# Patient Record
Sex: FEMALE | Race: WHITE | NOT HISPANIC OR LATINO
[De-identification: names, ages, dates, MRNs, and addresses within clinical notes are randomized per-mention and may not be internally consistent; named-entity substitution may affect disease eponyms.]

---
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
## 2024-01-03 ENCOUNTER — OFFICE VISIT (OUTPATIENT)
Dept: PHYSICAL THERAPY | Age: 19
End: 2024-01-03
Attending: ORTHOPAEDIC SURGERY
Payer: COMMERCIAL

## 2024-01-03 PROCEDURE — 97110 THERAPEUTIC EXERCISES: CPT | Performed by: PHYSICAL THERAPIST

## 2024-01-03 NOTE — PROGRESS NOTES
Dx: Rupture of posterior cruciate ligament of right knee, R knee pain           Authorized # of Visits:  12         Next MD visit: none scheduled  Fall Risk: low        Precautions: instability in knee with tear in PCL       Referring MD: Ben     Start of Service: 11/22/23     Subjective:   Pt reports that she was sick for a while and could not do her HEP. Pt reports that she is no longer having pain but still has pain with some issues with PF of ankle and still has instability or feels loose with performing standing to sitting.   Pain 0-6/10 worst occurs during sharp pain with quick movements.  Objective:   Fibular mob: posterior mod resistance, no pain  Tibial mob: posterior normal resistance, no pain  Patellar mob: normal resistance all directions, no pain    Assessment:   PT and pt discussing contacting MD to make a follow up apt and discuss options if instability does not improve over the next two to 3 weeks. Pt able to use bike with no issues but is still having pain with performing knee flexion with ankle PF, with performing standing to sitting while in the shower, with posterior pressure to tibia. Pt able to complete lateral step up and down on R LE with no issues as well as front step up and down and squats with no instability. Pt able to complete SL bridges with no pain as well as side lying hip abd with no pain and good control and tech. Pt is making steady progress with strength and stabilization but cont with instability and positive test with PCL tear with posterior drawer test.        Goals: (To be met in 12 visits)   Pt will increase hip and knee strength to grossly 5/5 to be able to ambulate ambulate around school campus without pain.  Pt will demonstrate increased hip ER/ABD strength to 5/5 to perform stepping and squatting activities without excessive femoral IR/ADD.  Pt will be independent and compliant with comprehensive HEP to maintain progress achieved in PT.  Pt able to increase knee  flexion strength to 5/5 on R side in order to improve gait mechanics during community ambulation.    Plan:   Pt to be able to self recognize knee valgus and progress strengthening as pt tolerates  Date: 11/24/2023   TX#: 2/12 Date: 11/30/23              TX#: 3/12   Date:       12/6/23        TX#: 4/12 Date:          12/12/23     TX#: 5/12 Date: 12/19/23              TX#: 6/12 Date: 1/3/24              TX#: 1/5 Date:               TX#: 2/5   TE x 40  Bike x 5 mins  Standing TKE in mirror x 5, cues to equalize weight bearing and ext in B LE  Sit to stand x 10 no UE for support  Lat walking x 30' ea yellow TB ankles  Step down x 10 2'' , x 10 4''  LAQ x 10 5 sec hold 2#  SLR x 10 5 sec hold 2#  Side lying hip abd x 10 5 sec hold 2#  Hip abd with bridges x 10 5 sec hold Red TB  Quad lifts in long sit x 10 ea 5 sec hold   TE x 30 min  Bike x 5 min  Standing TKE with ball x10 5 sec hold   DL press x 10 5 cords  SL press x 10 4 cords   LAQ x 10 5 sec hold 3#  Seated HS curl x 10 5 sec hold red TB  NMR x 15 min  Standing alignment education focused on neutral knees in frontal and sagittal planes x 3 min  Step down x 10 ea  SLS on ground 2 x 30 sec  Sit to stand with band for abd x 10 with ecc focus   TE x 45 min  Bike x 10 min  LAQ x 10 5 sec holds 5#  Seated HS curl x 10 5 sec hold green TB  Sidesteps x 40' green TB  Monster walks x 40' green TB  DL press x 10 5 cords with green TB around knees for abd  SL press x 10 5 cords  SLB 2 x 30\" ea  Step down x 10 hallway stairs   SL bridge 2 x 10 ea         TE x 10 min  Bike x 10 min  NMR x 10 min  SLB with knee flexion 2 x 30\" ea  Lat step down 2 x 10 4\" step     TE x 37  Bike x 10 mins L 2  Leg press x 6 cords 2 x 15   SL press 2 x 15 5 cords  HS curls x 15 ea green   Hip IR/ER x 12 ea bilaterally green   SLR x 12 5 sec hold 2#  SL hip abd x 12 5 sec hold 2#  SL hip flex to ext x 10 2#  Prone hip ext with knee flexion x 10 5 sec hold 2#   TE x 45 mins  Bike x 10 mins L 2  Lat  step up and down x 10 6'' step  Front step up and down x 10 6'' step  SL bridges x 10 5 sec hold R  SL hip abd x 10 5 sec hold  Discussion on next steps with therapy and calling ortho for follow up, anatomy of the knee and kinesiology with no PCL and mechanics and pressure on meniscus                    MT x 20 min  Fibular mob posterior grade 2-4, patellar mob grade 4, tibial posterior mob grade 4, myofascial release to lateral hamstring and ITB MT x 8 mins  STM to ITB and quad as well as HS        Charges: TE:3      Total Timed Treatment: 45 min  Total Treatment Time: 45 min

## 2024-01-04 ENCOUNTER — OFFICE VISIT (OUTPATIENT)
Dept: PHYSICAL THERAPY | Age: 19
End: 2024-01-04
Attending: ORTHOPAEDIC SURGERY
Payer: COMMERCIAL

## 2024-01-04 PROCEDURE — 97110 THERAPEUTIC EXERCISES: CPT | Performed by: PHYSICAL THERAPIST

## 2024-01-04 NOTE — PROGRESS NOTES
Dx: Rupture of posterior cruciate ligament of right knee, R knee pain           Authorized # of Visits:  12         Next MD visit:1/17/24  Fall Risk: low        Precautions: instability in knee with tear in PCL       Referring MD: Ben     Start of Service: 11/22/23     Subjective:   Pt reports that she did make a follow up ortho apt. Pt reports no residual soreness following last tx session. Pt reports that   Pain 0-6/10 worst occurs during sharp pain with quick movements.  Objective:   Fibular mob: posterior mod resistance, no pain  Tibial mob: posterior normal resistance, no pain  Patellar mob: normal resistance all directions, no pain    Assessment:   Pt with no issues on the bike. Pt able to complete forward and backward walking lunges with no pain and good control with no valgus. Pt able to perform lat walking with band at knees with no pain and good tech. Pt with slight increase in lateral trunk lean with moving band to ankles but cont with no valgus in knee.  Pt able to complete standing hip flexion with resistance and single leg sit to stand with good control and no pain. Pt with good control and no pain with SLR. Pt to cont with therapy with focus on strengthening until follow up with ortho on the 17th.      Goals: (To be met in 12 visits)   Pt will increase hip and knee strength to grossly 5/5 to be able to ambulate ambulate around school campus without pain.  Pt will demonstrate increased hip ER/ABD strength to 5/5 to perform stepping and squatting activities without excessive femoral IR/ADD.  Pt will be independent and compliant with comprehensive HEP to maintain progress achieved in PT.  Pt able to increase knee flexion strength to 5/5 on R side in order to improve gait mechanics during community ambulation.    Plan:   Pt to be able to self recognize knee valgus and progress strengthening as pt tolerates  Date: 11/24/2023   TX#: 2/12 Date: 11/30/23              TX#: 3/12   Date:       12/6/23         TX#: 4/12 Date:          12/12/23     TX#: 5/12 Date: 12/19/23              TX#: 6/12 Date: 1/3/24              TX#: 1/5 Date: 1/4/24              TX#: 2/5   TE x 40  Bike x 5 mins  Standing TKE in mirror x 5, cues to equalize weight bearing and ext in B LE  Sit to stand x 10 no UE for support  Lat walking x 30' ea yellow TB ankles  Step down x 10 2'' , x 10 4''  LAQ x 10 5 sec hold 2#  SLR x 10 5 sec hold 2#  Side lying hip abd x 10 5 sec hold 2#  Hip abd with bridges x 10 5 sec hold Red TB  Quad lifts in long sit x 10 ea 5 sec hold   TE x 30 min  Bike x 5 min  Standing TKE with ball x10 5 sec hold   DL press x 10 5 cords  SL press x 10 4 cords   LAQ x 10 5 sec hold 3#  Seated HS curl x 10 5 sec hold red TB  NMR x 15 min  Standing alignment education focused on neutral knees in frontal and sagittal planes x 3 min  Step down x 10 ea  SLS on ground 2 x 30 sec  Sit to stand with band for abd x 10 with ecc focus   TE x 45 min  Bike x 10 min  LAQ x 10 5 sec holds 5#  Seated HS curl x 10 5 sec hold green TB  Sidesteps x 40' green TB  Monster walks x 40' green TB  DL press x 10 5 cords with green TB around knees for abd  SL press x 10 5 cords  SLB 2 x 30\" ea  Step down x 10 hallway stairs   SL bridge 2 x 10 ea         TE x 10 min  Bike x 10 min  NMR x 10 min  SLB with knee flexion 2 x 30\" ea  Lat step down 2 x 10 4\" step     TE x 37  Bike x 10 mins L 2  Leg press x 6 cords 2 x 15   SL press 2 x 15 5 cords  HS curls x 15 ea green   Hip IR/ER x 12 ea bilaterally green   SLR x 12 5 sec hold 2#  SL hip abd x 12 5 sec hold 2#  SL hip flex to ext x 10 2#  Prone hip ext with knee flexion x 10 5 sec hold 2#   TE x 45 mins  Bike x 10 mins L 2  Lat step up and down x 10 6'' step  Front step up and down x 10 6'' step  SL bridges x 10 5 sec hold R  SL hip abd x 10 5 sec hold  Discussion on next steps with therapy and calling ortho for follow up, anatomy of the knee and kinesiology with no PCL and mechanics and pressure on  meniscus     TE x 40 mins  Bike x 12 mins L 2  Walking lunges x 30' forward and backward  Lat walking x 30' band at thighs green, x 30' band at ankles green  Standing hip flexion x 15 ea green TB  Single leg sit to stand x 10 R   SLR x 15 5#  SL hip abd x 15 5#  Prone hip ext x 15 5#  Supine hip flex to abd x 10 ea                         MT x 20 min  Fibular mob posterior grade 2-4, patellar mob grade 4, tibial posterior mob grade 4, myofascial release to lateral hamstring and ITB MT x 8 mins  STM to ITB and quad as well as HS        Charges: TE:3      Total Timed Treatment: 40 min  Total Treatment Time: 40 min

## 2024-01-08 ENCOUNTER — HOSPITAL ENCOUNTER (OUTPATIENT)
Age: 19
Discharge: HOME | End: 2024-01-08
Payer: COMMERCIAL

## 2024-01-08 VITALS
HEART RATE: 96 BPM | RESPIRATION RATE: 16 BRPM | TEMPERATURE: 99 F | OXYGEN SATURATION: 99 % | DIASTOLIC BLOOD PRESSURE: 90 MMHG | SYSTOLIC BLOOD PRESSURE: 130 MMHG

## 2024-01-08 DIAGNOSIS — R10.32 LEFT LOWER QUADRANT ABDOMINAL PAIN: ICD-10-CM

## 2024-01-08 DIAGNOSIS — R30.0 DYSURIA: Primary | ICD-10-CM

## 2024-01-08 LAB
B-HCG UR QL: NEGATIVE
BILIRUB UR QL STRIP: NEGATIVE
CLARITY UR: CLEAR
COLOR UR: YELLOW
GLUCOSE UR STRIP-MCNC: NEGATIVE MG/DL
KETONES UR STRIP-MCNC: NEGATIVE MG/DL
LEUKOCYTE ESTERASE UR QL STRIP: NEGATIVE
NITRITE UR QL STRIP: NEGATIVE
PH UR STRIP: 6 [PH]
PROT UR STRIP-MCNC: NEGATIVE MG/DL
SP GR UR STRIP: 1.01
UROBILINOGEN UR STRIP-ACNC: <2 MG/DL

## 2024-01-08 PROCEDURE — 99213 OFFICE O/P EST LOW 20 MIN: CPT | Performed by: PHYSICIAN ASSISTANT

## 2024-01-08 PROCEDURE — 81002 URINALYSIS NONAUTO W/O SCOPE: CPT | Performed by: PHYSICIAN ASSISTANT

## 2024-01-08 PROCEDURE — 81025 URINE PREGNANCY TEST: CPT | Performed by: PHYSICIAN ASSISTANT

## 2024-01-09 ENCOUNTER — TELEPHONE (OUTPATIENT)
Dept: FAMILY MEDICINE CLINIC | Facility: CLINIC | Age: 19
End: 2024-01-09

## 2024-01-09 RX ORDER — SULFAMETHOXAZOLE AND TRIMETHOPRIM 800; 160 MG/1; MG/1
1 TABLET ORAL 2 TIMES DAILY
Qty: 6 TABLET | Refills: 0 | Status: SHIPPED | OUTPATIENT
Start: 2024-01-09 | End: 2024-01-12

## 2024-01-09 NOTE — TELEPHONE ENCOUNTER
Patient advised. Verbalized understanding.   Also advised to push fluids and if feeling worse to call us. Verbalized understanding.

## 2024-01-09 NOTE — DISCHARGE INSTRUCTIONS
Continue to monitor your symptoms we will contact you with your urine culture but if symptoms worsen report to the urgent care or emergency room  Follow-up with primary care doctor.

## 2024-01-09 NOTE — ED PROVIDER NOTES
Patient Seen in: [facility]      History     Chief Complaint   Patient presents with    Urinary Symptoms    Abdomen/Flank Pain     Stated Complaint: possible UTI    Subjective:   The history is provided by the patient.       18-year-old female presents to the urgent care due to intermittent dysuria for the past 24 hours.  Associated urinary urgency.  At times will have left lower abdominal pain only with urination.  No fevers, nausea or vomiting.  Normal bowel movements today no diarrhea.  No abnormal vaginal discharge or no concern for STIs.  No history of urinary tract infections or kidney stones in the past.  Last menstrual cycle was 2 weeks ago and normal.  No history of ovarian cyst.  No Previous abdominal surgeries    Objective:   Past Medical History:   Diagnosis Date    Anxiety     Depression               History reviewed. No pertinent surgical history.             No pertinent social history.            Review of Systems   Constitutional: Negative.    Respiratory: Negative.     Cardiovascular: Negative.    Gastrointestinal:  Positive for abdominal pain. Negative for diarrhea, nausea and vomiting.   Genitourinary:  Positive for dysuria and urgency. Negative for flank pain, vaginal bleeding, vaginal discharge and vaginal pain.   Musculoskeletal: Negative.    Skin: Negative.        Positive for stated complaint: possible UTI  Other systems are as noted in HPI.  Constitutional and vital signs reviewed.      All other systems reviewed and negative except as noted above.    Physical Exam     ED Triage Vitals   BP 01/08/24 1916 130/90   Pulse 01/08/24 1916 96   Resp 01/08/24 1916 16   Temp 01/08/24 1943 98.7 °F (37.1 °C)   Temp src 01/08/24 1943 Oral   SpO2 01/08/24 1916 99 %   O2 Device 01/08/24 1916 None (Room air)       Current:/90   Pulse 96   Temp 98.7 °F (37.1 °C) (Oral)   Resp 16   LMP 12/24/2023 (Exact Date)   SpO2 99%         Physical Exam  Vitals and nursing note reviewed.    Constitutional:       General: She is not in acute distress.     Appearance: She is well-developed. She is not toxic-appearing.   HENT:      Head: Normocephalic.   Cardiovascular:      Rate and Rhythm: Normal rate and regular rhythm.   Pulmonary:      Effort: Pulmonary effort is normal.      Breath sounds: Normal breath sounds.   Abdominal:      General: Abdomen is flat. Bowel sounds are normal.      Palpations: Abdomen is soft.      Tenderness: There is no abdominal tenderness. There is no right CVA tenderness, left CVA tenderness or guarding.   Skin:     General: Skin is warm.   Neurological:      General: No focal deficit present.      Mental Status: She is alert and oriented to person, place, and time.   Psychiatric:         Mood and Affect: Mood normal.         Behavior: Behavior normal.               ED Course     Labs Reviewed   [facility] POCT URINALYSIS DIPSTICK - Abnormal; Notable for the following components:       Result Value    Blood, Urine Trace-Intact (*)     All other components within normal limits   POCT PREGNANCY URINE - Normal   URINE CULTURE, ROUTINE                      MDM     18-year-old female presents to the urgent care due to dysuria for the past 24 hours.  Associated urinary frequency.  Denies any fevers, flank pain.  At times with urination will complain of left lower quadrant pain but only with urination.  Otherwise no abnormal vaginal discharge vaginal bleeding.  No history of UTIs in the past.          Exam the patient is afebrile nontoxic.  Vital signs are stable.  Abdomen is completely soft and nontender.  No pelvic complaints.  UA shows trace blood no other signs of infection.  Pregnancy is negative.  I discussed the broad differential of diagnoses about this abdominal pain.  Including ovarian cyst, UTIs, nephrolithiasis.  Patient is currently resting comfortably without any pain and has taken no pain medications.  Will send her urine for culture.  Patient would like to just continue  to monitor her symptoms and will return return to the urgent care/emergency room if symptoms worsen.  Strict return precautions were discussed at length and all questions were answered.                     Medical Decision Making  Problems Addressed:  Dysuria: acute illness or injury        Disposition and Plan     Clinical Impression:  1. Dysuria    2. Left lower quadrant abdominal pain         Disposition:  Discharge  1/8/2024  7:44 pm    Follow-up:  Kelley Dela Cruz MD  [id number] [id number] [phone]                Medications Prescribed:  Current Discharge Medication List

## 2024-01-09 NOTE — TELEPHONE ENCOUNTER
Call from patient  Went to IC yesterday for painful urination  UA showed blood  Culture pending  Nothing prescribed  Patient states still having painful urination  Please advise

## 2024-01-09 NOTE — TELEPHONE ENCOUNTER
Pt went to the IC yesterday for UTI sx,  the rapid came back negative,  but pt is still having painful urination and blood in her urine.  She would like to talk to a nurse

## 2024-01-10 NOTE — TELEPHONE ENCOUNTER
Pt's mom called and states pt's still having bad pain and has now started having pain in her back. Per pt's mom, she is negative for a bladder infection and would like to know how to proceed. Please advise

## 2024-01-10 NOTE — TELEPHONE ENCOUNTER
Called mom to discuss - LMTCB.  Discussed with [place].  Pt should go to the ER for possible kidney stone.  Urine culture is negative.

## 2024-01-11 ENCOUNTER — OFFICE VISIT (OUTPATIENT)
Dept: PHYSICAL THERAPY | Age: 19
End: 2024-01-11
Attending: ORTHOPAEDIC SURGERY
Payer: COMMERCIAL

## 2024-01-11 PROCEDURE — 97110 THERAPEUTIC EXERCISES: CPT | Performed by: PHYSICAL THERAPIST

## 2024-01-12 ENCOUNTER — APPOINTMENT (OUTPATIENT)
Dept: PHYSICAL THERAPY | Age: 19
End: 2024-01-12
Attending: ORTHOPAEDIC SURGERY
Payer: COMMERCIAL

## 2024-01-12 NOTE — PROGRESS NOTES
Dx: Rupture of posterior cruciate ligament of right knee, R knee pain           Authorized # of Visits:  12         Next MD visit:1/17/24  Fall Risk: low        Precautions: instability in knee with tear in PCL       Referring MD: Ben     Start of Service: 11/22/23     Subjective:   Pt reports that she is feeling better with less pain overall. Pt reports that at times she can flex her knee with no pain and other times it is still sore and stuck.  Pain 0-6/10 worst occurs during sharp pain with quick movements.  Objective:   Fibular mob: posterior mod resistance, no pain  Tibial mob: posterior normal resistance, no pain  Patellar mob: normal resistance all directions, no pain    Assessment:   Pt able to complete lat walking on TM with resistance with no issues. Pt able to complete sumo squats with good tech and no pain. Pt able to complete single leg step ups on big black box with mod push off and fair eccentric control. Pt able to complete HS isometric holds with no issues on L and mild posterior tightness and discomfort on R. Pt able to complete sit to stand from small black box with initial cues to not allow valgus and able to correct and complete with no issues. Pt with slight difficulty with balance with end range of airplane lift with more instability and effort on R compared to L. Pt with noted weakness along hip add in side lying and required cues to decrease knee flexion and maintain full knee ext.       Goals: (To be met in 12 visits)   Pt will increase hip and knee strength to grossly 5/5 to be able to ambulate ambulate around school campus without pain.  Pt will demonstrate increased hip ER/ABD strength to 5/5 to perform stepping and squatting activities without excessive femoral IR/ADD.  Pt will be independent and compliant with comprehensive HEP to maintain progress achieved in PT.  Pt able to increase knee flexion strength to 5/5 on R side in order to improve gait mechanics during community  ambulation.    Plan:   Pt to be able to self recognize knee valgus and progress strengthening as pt tolerates  Date: 11/24/2023   TX#: 2/12 Date: 11/30/23              TX#: 3/12   Date:       12/6/23        TX#: 4/12 Date:          12/12/23     TX#: 5/12 Date: 12/19/23              TX#: 6/12 Date: 1/3/24              TX#: 1/5 Date: 1/4/24              TX#: 2/5 1/11/24  3/5   TE x 40  Bike x 5 mins  Standing TKE in mirror x 5, cues to equalize weight bearing and ext in B LE  Sit to stand x 10 no UE for support  Lat walking x 30' ea yellow TB ankles  Step down x 10 2'' , x 10 4''  LAQ x 10 5 sec hold 2#  SLR x 10 5 sec hold 2#  Side lying hip abd x 10 5 sec hold 2#  Hip abd with bridges x 10 5 sec hold Red TB  Quad lifts in long sit x 10 ea 5 sec hold   TE x 30 min  Bike x 5 min  Standing TKE with ball x10 5 sec hold   DL press x 10 5 cords  SL press x 10 4 cords   LAQ x 10 5 sec hold 3#  Seated HS curl x 10 5 sec hold red TB  NMR x 15 min  Standing alignment education focused on neutral knees in frontal and sagittal planes x 3 min  Step down x 10 ea  SLS on ground 2 x 30 sec  Sit to stand with band for abd x 10 with ecc focus   TE x 45 min  Bike x 10 min  LAQ x 10 5 sec holds 5#  Seated HS curl x 10 5 sec hold green TB  Sidesteps x 40' green TB  Monster walks x 40' green TB  DL press x 10 5 cords with green TB around knees for abd  SL press x 10 5 cords  SLB 2 x 30\" ea  Step down x 10 hallway stairs   SL bridge 2 x 10 ea         TE x 10 min  Bike x 10 min  NMR x 10 min  SLB with knee flexion 2 x 30\" ea  Lat step down 2 x 10 4\" step     TE x 37  Bike x 10 mins L 2  Leg press x 6 cords 2 x 15   SL press 2 x 15 5 cords  HS curls x 15 ea green   Hip IR/ER x 12 ea bilaterally green   SLR x 12 5 sec hold 2#  SL hip abd x 12 5 sec hold 2#  SL hip flex to ext x 10 2#  Prone hip ext with knee flexion x 10 5 sec hold 2#   TE x 45 mins  Bike x 10 mins L 2  Lat step up and down x 10 6'' step  Front step up and down x 10 6''  step  SL bridges x 10 5 sec hold R  SL hip abd x 10 5 sec hold  Discussion on next steps with therapy and calling ortho for follow up, anatomy of the knee and kinesiology with no PCL and mechanics and pressure on meniscus     TE x 40 mins  Bike x 12 mins L 2  Walking lunges x 30' forward and backward  Lat walking x 30' band at thighs green, x 30' band at ankles green  Standing hip flexion x 15 ea green TB  Single leg sit to stand x 10 R   SLR x 15 5#  SL hip abd x 15 5#  Prone hip ext x 15 5#  Supine hip flex to abd x 10 ea           TE x 45 mins  Lat walking on TM x 2 mins red cuff  Sumo squats x 15  Single leg step up x 10 ea big black box  HS isometric curls x 10 5 sec hold ea  Sit to stand from small black box x 10  Airplanes x 10 ea   Side lying hip add x 12 2#                  MT x 20 min  Fibular mob posterior grade 2-4, patellar mob grade 4, tibial posterior mob grade 4, myofascial release to lateral hamstring and ITB MT x 8 mins  STM to ITB and quad as well as HS         Charges: TE:3      Total Timed Treatment: 40 min  Total Treatment Time: 40 min

## 2024-01-16 ENCOUNTER — OFFICE VISIT (OUTPATIENT)
Dept: PHYSICAL THERAPY | Age: 19
End: 2024-01-16
Attending: ORTHOPAEDIC SURGERY
Payer: COMMERCIAL

## 2024-01-16 PROCEDURE — 97110 THERAPEUTIC EXERCISES: CPT | Performed by: PHYSICAL THERAPIST

## 2024-01-16 NOTE — PROGRESS NOTES
Dx: Rupture of posterior cruciate ligament of right knee, R knee pain           Authorized # of Visits:  12         Next MD visit:1/17/24  Fall Risk: low        Precautions: instability in knee with tear in PCL       Referring MD: Ben     Start of Service: 11/22/23     Subjective:   Pt reports that she is still having less overall pain with knee flexion in standing but is still having instability with single leg balance and sitting in shower. Pt reports that overall pain has improved and is no longer having pain with the instability but is still having instability. Pt reports some instability with descending stairs fast. Pt is still having sharp pain with quick movements but pain goes away quickly. Pt reports that mainly pain comes with kneeling.  Pain 0-4/10   Objective:   5/5 strength in R LE with MMT    Assessment:   Pt with follow up with ortho tomorrow to determine continuation of care or further interventions. Pt able to increase resistance on bike with no issues. Pt with no pain or instability reported with leg press with double or single legs. Pt with good strength and static balance on B LE but does still have instability with PCL testing and dynamic balance. Pt with full AROM in all directions in knee and hip with no pain. Pt does have pain with kneeling deep with heels to buttocks. Pt with no issues with stability with R LE as stance leg during donkey kicks or movement leg on leg press machine. Pt able to complete eccentric lowering and step up and down both front and lateral with good control stability and no pain on R LE.       Goals: (To be met in 12 visits)   Pt will increase hip and knee strength to grossly 5/5 to be able to ambulate ambulate around school campus without pain.  Pt will demonstrate increased hip ER/ABD strength to 5/5 to perform stepping and squatting activities without excessive femoral IR/ADD.  Pt will be independent and compliant with comprehensive HEP to maintain progress  achieved in PT.  Pt able to increase knee flexion strength to 5/5 on R side in order to improve gait mechanics during community ambulation.    Plan:   Pt to be able to self recognize knee valgus and progress strengthening as pt tolerates  Date: 11/24/2023   TX#: 2/12 Date: 11/30/23              TX#: 3/12   Date:       12/6/23        TX#: 4/12 Date:          12/12/23     TX#: 5/12 Date: 12/19/23              TX#: 6/12 Date: 1/3/24              TX#: 1/5 Date: 1/4/24              TX#: 2/5 1/11/24  3/5 1/16/24  4/5   TE x 40  Bike x 5 mins  Standing TKE in mirror x 5, cues to equalize weight bearing and ext in B LE  Sit to stand x 10 no UE for support  Lat walking x 30' ea yellow TB ankles  Step down x 10 2'' , x 10 4''  LAQ x 10 5 sec hold 2#  SLR x 10 5 sec hold 2#  Side lying hip abd x 10 5 sec hold 2#  Hip abd with bridges x 10 5 sec hold Red TB  Quad lifts in long sit x 10 ea 5 sec hold   TE x 30 min  Bike x 5 min  Standing TKE with ball x10 5 sec hold   DL press x 10 5 cords  SL press x 10 4 cords   LAQ x 10 5 sec hold 3#  Seated HS curl x 10 5 sec hold red TB  NMR x 15 min  Standing alignment education focused on neutral knees in frontal and sagittal planes x 3 min  Step down x 10 ea  SLS on ground 2 x 30 sec  Sit to stand with band for abd x 10 with ecc focus   TE x 45 min  Bike x 10 min  LAQ x 10 5 sec holds 5#  Seated HS curl x 10 5 sec hold green TB  Sidesteps x 40' green TB  Monster walks x 40' green TB  DL press x 10 5 cords with green TB around knees for abd  SL press x 10 5 cords  SLB 2 x 30\" ea  Step down x 10 hallway stairs   SL bridge 2 x 10 ea         TE x 10 min  Bike x 10 min  NMR x 10 min  SLB with knee flexion 2 x 30\" ea  Lat step down 2 x 10 4\" step     TE x 37  Bike x 10 mins L 2  Leg press x 6 cords 2 x 15   SL press 2 x 15 5 cords  HS curls x 15 ea green   Hip IR/ER x 12 ea bilaterally green   SLR x 12 5 sec hold 2#  SL hip abd x 12 5 sec hold 2#  SL hip flex to ext x 10 2#  Prone hip ext with  knee flexion x 10 5 sec hold 2#   TE x 45 mins  Bike x 10 mins L 2  Lat step up and down x 10 6'' step  Front step up and down x 10 6'' step  SL bridges x 10 5 sec hold R  SL hip abd x 10 5 sec hold  Discussion on next steps with therapy and calling ortho for follow up, anatomy of the knee and kinesiology with no PCL and mechanics and pressure on meniscus     TE x 40 mins  Bike x 12 mins L 2  Walking lunges x 30' forward and backward  Lat walking x 30' band at thighs green, x 30' band at ankles green  Standing hip flexion x 15 ea green TB  Single leg sit to stand x 10 R   SLR x 15 5#  SL hip abd x 15 5#  Prone hip ext x 15 5#  Supine hip flex to abd x 10 ea           TE x 45 mins  Lat walking on TM x 2 mins red cuff  Sumo squats x 15  Single leg step up x 10 ea big black box  HS isometric curls x 10 5 sec hold ea  Sit to stand from small black box x 10  Airplanes x 10 ea   Side lying hip add x 12 2#   TE x 40 mins  Bike x 10 mins L 3  Leg press x 20 8 cords  SL press 2 x 15 8 cords  Donkey kicks on machine x 10 3 cords ea  Lat step down x 12 8'' step  Front step up with glute focus x 12 8'' step   Supine HS sliders x 10  SB hip abd in half kneeling x 10 5 sec hold ea  SB bridge with SL HS curl x 10 ea   SLB tramp throws x 10 front and B sides                     MT x 20 min  Fibular mob posterior grade 2-4, patellar mob grade 4, tibial posterior mob grade 4, myofascial release to lateral hamstring and ITB MT x 8 mins  STM to ITB and quad as well as HS          Charges: TE:3      Total Timed Treatment: 40 min  Total Treatment Time: 40 min

## 2024-01-17 ENCOUNTER — OFFICE VISIT (OUTPATIENT)
Dept: ORTHOPEDICS CLINIC | Facility: CLINIC | Age: 19
End: 2024-01-17
Payer: COMMERCIAL

## 2024-01-17 VITALS — WEIGHT: 130 LBS | BODY MASS INDEX: 20.89 KG/M2 | HEIGHT: 66 IN

## 2024-01-17 DIAGNOSIS — S83.521D RUPTURE OF POSTERIOR CRUCIATE LIGAMENT OF RIGHT KNEE, SUBSEQUENT ENCOUNTER: Primary | ICD-10-CM

## 2024-01-17 PROCEDURE — 99213 OFFICE O/P EST LOW 20 MIN: CPT | Performed by: PHYSICIAN ASSISTANT

## 2024-01-17 PROCEDURE — 3008F BODY MASS INDEX DOCD: CPT | Performed by: PHYSICIAN ASSISTANT

## 2024-01-17 NOTE — PROGRESS NOTES
[facility] - ORTHOPEDICS  [address] [place] [id number]  [phone]       Name: Arianna Brown   MRN: [id number]  Date: 1/17/2024     REASON FOR VISIT: Follow up for right PCL and medial meniscus tears onset 11/12/2023 after falling while ice skating. Upon closer review, the injury pattern is more consistent with high grade sprain. This is amenable to nonsurgical treatment.      INTERVAL HISTORY:  Arianna Brown is a 18 year old female who returns for evaluation of right PCL and medial meniscus tears onset 11/12/2023 after falling while ice skating. Upon closer review, the injury pattern is more consistent with high grade sprain. This is amenable to nonsurgical treatment.      At the patient's last visit we recommended physical therapy which she has completed labs and is noted near complete resolution of her symptoms.  She rates her pain to be 1 out of 10 and overall is doing well    ROS: ROS    PE:   Vitals:    01/17/24 1257   Weight: 130 lb (59 kg)   Height: 5' 6\" (1.676 m)     Estimated body mass index is 20.98 kg/m² as calculated from the following:    Height as of this encounter: 5' 6\" (1.676 m).    Weight as of this encounter: 130 lb (59 kg).    Physical Exam  Constitutional:       Appearance: Normal appearance.   HENT:      Head: Normocephalic and atraumatic.   Eyes:      Extraocular Movements: Extraocular movements intact.   Neck:      Musculoskeletal: Normal range of motion and neck supple.   Cardiovascular:      Pulses: Normal pulses.   Pulmonary:      Effort: Pulmonary effort is normal. No respiratory distress.   Abdominal:      General: There is no distension.   Skin:     General: Skin is warm.      Capillary Refill: Capillary refill takes less than 2 seconds.      Findings: No bruising.   Neurological:      General: No focal deficit present.      Mental Status: She is alert.   Psychiatric:         Mood and Affect: Mood normal.     Examination of the right knee  demonstrates:     Skin is intact, warm and dry.   Atrophy: none    Effusion: none    Joint line tenderness: none  Crepitation: none   Clinton: Negative   Patellar mobility: normal without apprehension  J-sign: none    ROM: Extension full  Flexion 140 degrees  ACL:  Negative Lachman, Negative Pivot Shift   PCL:  Negative Posterior Drawer  Collateral Ligaments: Stable to Varus and Valgus stress at 0 and 30 degrees  Strength: normal   Hip joint: normal pain-free ROM   Gait:  normal   Leg length: equal and symmetric  Alignment:  neutral     No obvious peripheral edema noted.   Distal neurovascular exam demonstrates normal perfusion, intact sensation to light touch and full strength.       IMPRESSION: Arianna Brown is a 18 year old female who presented for follow up of  right PCL and medial meniscus tears onset 11/12/2023 after falling while ice skating. Upon closer review, the injury pattern is more consistent with high grade sprain. This is amenable to nonsurgical treatment.      PLAN:   The patient notes near complete resolution of symptoms, and return to full baseline function. The patient can follow up with our office as needed. The patient had the opportunity to ask questions, and all questions were answered appropriately.       FOLLOW-UP:  Return to clinic on an as needed basis.             Dotty Covarrubias [facility], PA-C Orthopedic Surgery / Sports Medicine Specialist  [facility]  [address] [place] [address].org  [email or web address]  t: [phone]  o: [phone]  f: [phone]    This note was dictated using Dragon software.  While it was briefly proofread prior to completion, some grammatical, spelling, and word choice errors due to dictation may still occur.

## 2024-01-18 ENCOUNTER — APPOINTMENT (OUTPATIENT)
Dept: PHYSICAL THERAPY | Age: 19
End: 2024-01-18
Attending: ORTHOPAEDIC SURGERY
Payer: COMMERCIAL

## 2024-01-19 ENCOUNTER — OFFICE VISIT (OUTPATIENT)
Dept: OBGYN CLINIC | Facility: CLINIC | Age: 19
End: 2024-01-19
Payer: COMMERCIAL

## 2024-01-19 VITALS
DIASTOLIC BLOOD PRESSURE: 78 MMHG | WEIGHT: 132.38 LBS | BODY MASS INDEX: 21 KG/M2 | HEART RATE: 83 BPM | SYSTOLIC BLOOD PRESSURE: 118 MMHG

## 2024-01-19 DIAGNOSIS — N83.202 LEFT OVARIAN CYST: Primary | ICD-10-CM

## 2024-01-19 PROCEDURE — 99203 OFFICE O/P NEW LOW 30 MIN: CPT | Performed by: OBSTETRICS & GYNECOLOGY

## 2024-01-19 PROCEDURE — 3074F SYST BP LT 130 MM HG: CPT | Performed by: OBSTETRICS & GYNECOLOGY

## 2024-01-19 PROCEDURE — 3078F DIAST BP <80 MM HG: CPT | Performed by: OBSTETRICS & GYNECOLOGY

## 2024-01-19 NOTE — PROGRESS NOTES
Subjective:  Chief Complaint   Patient presents with    Other     FOLLOW UP AFTER E.R. VISIT-student ok     18 year old female  presents for follow up to ED visit with hemorrhagic left ovarian cyst.  Pain started two weeks ago, and got more severe 1/10/24.  Also 1 yr history of upper back pain.  Pain primarily lower abdomen and LLQ.  No radiation.  Not sexually active.  Started on Bactrim for possible UTI but U/A negative    Patient's last menstrual period was 2024 (exact date).  Hx Prior Abnormal Pap: No  Pap Result Notes: never had a pap  Menarche: 10 (2024 12:23 PM)  Period Cycle (Days): 28-30DAYS (2024 12:23 PM)  Period Duration (Days): 6days (2024 12:23 PM)  Period Flow: moderate (2024 12:23 PM)  Use of Birth Control (if yes, specify type): None (2024 12:23 PM)  Hx Prior Abnormal Pap: No (2024 12:23 PM)  Pap Result Notes: never had a pap (2024 12:23 PM)      Last Pap smear: na     Abnormal Pap: na    Pelvic Infections/STD: None  Contraception: virginal    Most Recent Immunizations   Administered Date(s) Administered    Covid-19 Vaccine Pfizer 30 mcg/0.3 ml 2021    DTAP INFANRIX 2010    HEP A,Ped/Adol,(2 Dose) 2018    HIB 2006    Hep B, Unspecified Formulation 2005    Hpv Virus Vaccine 9 Joanna Im 2017    IPV 2010    MMR 2010    Meningococcal-Menactra 2021    Pneumococcal (Prevnar 7) 2005    TDAP 2016    Varicella 2010      reports that she has never smoked. She has never used smokeless tobacco.   reports no history of alcohol use.    Past Medical History:   Diagnosis Date    Anxiety     Depression      History reviewed. No pertinent surgical history.    Review of Systems:  Pertinent items are noted in the HPI.    Objective:  /78   Pulse 83   Wt 132 lb 6 oz (60 kg)   LMP 2024 (Exact Date)   BMI 21.37 kg/m²    Physical Examination:  General appearance: Well dressed, well nourished  in no apparent distress  Neurologic/Psychiatric: Alert and oriented to person, place and time, mood normal, affect appropriate  Abdomen: Soft, non-tender, non-distended, no masses, no hepatosplenomegaly, no hernias, no inguinal lymphadenopathy    Ultrasound 1/10/24  EXAM: US TRANSVAGINAL   HISTORY: lower abd pain, pelvic pain, Cyst? Torsion?    TECHNIQUE:  US TRANSVAGINAL female pelvic ultrasound was performed.    COMPARISON:  Pelvic ultrasound dated 5/10/2019.   LMP:  12/24/2023.   FINDINGS:   UTERUS:    Measures: 6.8 x 3 x 4.2 cm.  Normal echotexture.   Endometrial stripe:  Uniform thickness,. Endometrial stripe measures  11 mm.   RIGHT OVARY:   Measures:  3.8 x 1.8 x 3.2 cm.   Normal   Normal color flow.   LEFT OVARY:    Measures:  5.3 x 3.1 x 4.4 cm.   Complex cystic mass with low-level internal echoes in the left ovary measuring 4.5 x 3.5 x 2.2 cm.   Normal color flow.   ADNEXA/OTHER: No adnexal masses noted.   FLUID:   Trace physiologic fluid in the posterior cul-de-sac is within normal limits.   IMPRESSION:   1. Normal uterus.   2.  No evidence for ovarian torsion.   3.  Probable hemorrhagic cyst in the left ovary. Follow-up is recommended with transvaginal ultrasound in 6 to 12 weeks.     Assessment/Plan:  Left hemorrhagic corpus luteum cyst- benign exam.  Recommend expectant management and repeat ultrasound 6-8 weeks.  Recommend no strenuous activity or exercise.  PT for leg rehab is fine.    Patient offered chaperone for exam, declined    Diagnoses and all orders for this visit:    Left ovarian cyst      Return for Ultrasound 6-8 weeks.

## 2024-01-23 ENCOUNTER — OFFICE VISIT (OUTPATIENT)
Dept: PHYSICAL THERAPY | Age: 19
End: 2024-01-23
Attending: ORTHOPAEDIC SURGERY
Payer: COMMERCIAL

## 2024-01-23 PROCEDURE — 97110 THERAPEUTIC EXERCISES: CPT | Performed by: PHYSICAL THERAPIST

## 2024-01-23 NOTE — PROGRESS NOTES
Progress Summary  Pt has attended 5 visits in Physical Therapy.     Dx: Rupture of posterior cruciate ligament of right knee, R knee pain           Authorized # of Visits:  12         Next MD visit:1/17/24  Fall Risk: low        Precautions: instability in knee with tear in PCL       Referring MD: Ben     Start of Service: 11/22/23     Subjective:   Pt reports that she saw the PA for ortho office and was told to cont with strengthening and avoid skiing, deep squatting. Pt is still having pain with leaning during W sit and pressure on R knee. Pt cont with burning pain along distal patellar tendon region. Pt is not as compliant with HEP and is performing a couple of times a week.   Pain 0-4/10   Objective:   5/5 strength in R LE with MMT    Assessment:   Pt has met all STGs  Pt able to complete bike with resistance with no issues. Pt with noted fatigue with SL hip abd and add but no issues with SLR and hip ext in prone. PT performing posterior drawer test and positive findings on R LE compared to L LE. Pt with noted fatigue with SL bridges with resistance on R LE only. Pt with anterior knee pain with keep knee in full ext during single leg bridges on L. Pt with no issues on leg press machine with donkey kicks and good stabilization on B LE. PT and pt cont to discuss different outcomes with knee and if she would like to cont with strengthening or consider surgery.   Pt cont with pain with deep squat with heels to buttocks, tall kneeling, sitting with legs crossed in elena cross position, knee flexion with PF, descending stairs quickly with instability. Pt reports neurological symptoms with running along patellar tendon.     Goals: (To be met in 12 visits)   Pt will increase hip and knee strength to grossly 5/5 to be able to ambulate ambulate around school campus without pain. Met  Pt will demonstrate increased hip ER/ABD strength to 5/5 to perform stepping and squatting activities without excessive femoral IR/ADD.  Progressing at 4/5  Pt will be independent and compliant with comprehensive HEP to maintain progress achieved in PT. met  Pt able to increase knee flexion strength to 5/5 on R side in order to improve gait mechanics during community ambulation. Met  New goals  Pt to be able to tall kneel with equal pressure on B LE and no pain  Pt to increase strength and stability to be able to sit elena cross position  Pt to be able to increase stability as well as strength to be able to perform a deep squat.     LEFS Score  LEFS Score: 42.5 % (11/22/2023  3:14 PM)      Plan: Continue skilled Physical Therapy 1-3 x/week or a total of 8 visits over a 90 day period. Treatment will include: Therex, Theract, NMR, Manual, Modalities, Self Care, Pump.          Patient/Family/Caregiver was advised of these findings, precautions, and treatment options and has agreed to actively participate in planning and for this course of care.    Thank you for your referral. If you have any questions, please contact me at Dept: [phone].    Sincerely,  Electronically signed by therapist: Thu Conner     Physician's certification required:  Yes  Please co-sign or sign and return this letter via fax as soon as possible to [phone].   I certify the need for these services furnished under this plan of treatment and while under my care.    X___________________________________________________ Date____________________    Certification From: 1/25/2024  To:4/24/2024     Plan:   Pt to be able to self recognize knee valgus and progress strengthening as pt tolerates  Date:       12/6/23        TX#: 4/12 Date:          12/12/23     TX#: 5/12 Date: 12/19/23              TX#: 6/12 Date: 1/3/24              TX#: 1/5 Date: 1/4/24              TX#: 2/5 1/11/24  3/5 1/16/24 4/5 1/23/24 5/5   TE x 45 min  Bike x 10 min  LAQ x 10 5 sec holds 5#  Seated HS curl x 10 5 sec hold green TB  Sidesteps x 40' green TB  Monster walks x 40' green TB  DL press x 10 5  cords with green TB around knees for abd  SL press x 10 5 cords  SLB 2 x 30\" ea  Step down x 10 hallway stairs   SL bridge 2 x 10 ea         TE x 10 min  Bike x 10 min  NMR x 10 min  SLB with knee flexion 2 x 30\" ea  Lat step down 2 x 10 4\" step     TE x 37  Bike x 10 mins L 2  Leg press x 6 cords 2 x 15   SL press 2 x 15 5 cords  HS curls x 15 ea green   Hip IR/ER x 12 ea bilaterally green   SLR x 12 5 sec hold 2#  SL hip abd x 12 5 sec hold 2#  SL hip flex to ext x 10 2#  Prone hip ext with knee flexion x 10 5 sec hold 2#   TE x 45 mins  Bike x 10 mins L 2  Lat step up and down x 10 6'' step  Front step up and down x 10 6'' step  SL bridges x 10 5 sec hold R  SL hip abd x 10 5 sec hold  Discussion on next steps with therapy and calling ortho for follow up, anatomy of the knee and kinesiology with no PCL and mechanics and pressure on meniscus     TE x 40 mins  Bike x 12 mins L 2  Walking lunges x 30' forward and backward  Lat walking x 30' band at thighs green, x 30' band at ankles green  Standing hip flexion x 15 ea green TB  Single leg sit to stand x 10 R   SLR x 15 5#  SL hip abd x 15 5#  Prone hip ext x 15 5#  Supine hip flex to abd x 10 ea           TE x 45 mins  Lat walking on TM x 2 mins red cuff  Sumo squats x 15  Single leg step up x 10 ea big black box  HS isometric curls x 10 5 sec hold ea  Sit to stand from small black box x 10  Airplanes x 10 ea   Side lying hip add x 12 2#   TE x 40 mins  Bike x 10 mins L 3  Leg press x 20 8 cords  SL press 2 x 15 8 cords  Donkey kicks on machine x 10 3 cords ea  Lat step down x 12 8'' step  Front step up with glute focus x 12 8'' step   Supine HS sliders x 10  SB hip abd in half kneeling x 10 5 sec hold ea  SB bridge with SL HS curl x 10 ea   SLB tramp throws x 10 front and B sides     TE x 45 mins  Bike x 11 mins L 2  PCL testing with positive posterior drawer on R  Table 4 way SLR x 15 ea 5 sec holds 5#  SL bridges x 15 ea 5# on R  Donkey kicks on machine x 12 ea 3  cords                     MT x 20 min  Fibular mob posterior grade 2-4, patellar mob grade 4, tibial posterior mob grade 4, myofascial release to lateral hamstring and ITB MT x 8 mins  STM to ITB and quad as well as HS           Charges: TE:3      Total Timed Treatment: 40 min  Total Treatment Time: 40 min

## 2024-01-25 ENCOUNTER — OFFICE VISIT (OUTPATIENT)
Dept: PHYSICAL THERAPY | Age: 19
End: 2024-01-25
Attending: ORTHOPAEDIC SURGERY
Payer: COMMERCIAL

## 2024-01-25 PROCEDURE — 97110 THERAPEUTIC EXERCISES: CPT | Performed by: PHYSICAL THERAPIST

## 2024-01-25 NOTE — PROGRESS NOTES
Dx: Rupture of posterior cruciate ligament of right knee, R knee pain           Authorized # of Visits:  12         Next MD visit:1/17/24  Fall Risk: low        Precautions: instability in knee with tear in PCL       Referring MD: Ben     Start of Service: 11/22/23     Subjective:   Pt reports that she is having more pain with prolonged standing at work and cont with cold, numbness along patellar tendon on R with descending stairs and pain with tall kneeling on R.    Pain 0-4/10   Objective:   5/5 strength in R LE with MMT    Assessment:   Pt able to ride bike with no issues. Pt with good balance with single leg stance on bosu. Pt with good control and stability with front and side lunges on bosu with no pain reported. Pt with good control with cross over step ups onto stair as well as lat step ups with eccentric focus. Pt making steady progress with strength and stability in R LE but cont with pain and reports of instability and is concerned that it will not cont to improve with more strengthening. PT edu pt to make another follow up apt with ortho to discuss concerns in greater detail and cont with strengthening for the next 2 weeks per discussion with MD. Pt reports understanding and is in agreement with POC.       Goals: (To be met in 12 visits)   Pt will increase hip and knee strength to grossly 5/5 to be able to ambulate ambulate around school campus without pain.  Pt will demonstrate increased hip ER/ABD strength to 5/5 to perform stepping and squatting activities without excessive femoral IR/ADD.  Pt will be independent and compliant with comprehensive HEP to maintain progress achieved in PT.  Pt able to increase knee flexion strength to 5/5 on R side in order to improve gait mechanics during community ambulation.    Plan:   Pt to be able to self recognize knee valgus and progress strengthening as pt tolerates  Date:       12/6/23        TX#: 4/12 Date:          12/12/23     TX#: 5/12 Date: 12/19/23               TX#: 6/12 Date: 1/3/24              TX#: 1/5 Date: 1/4/24              TX#: 2/5 1/11/24  3/5 1/16/24  4/5 1/23/24  5/5 1/25/24  6/?   TE x 45 min  Bike x 10 min  LAQ x 10 5 sec holds 5#  Seated HS curl x 10 5 sec hold green TB  Sidesteps x 40' green TB  Monster walks x 40' green TB  DL press x 10 5 cords with green TB around knees for abd  SL press x 10 5 cords  SLB 2 x 30\" ea  Step down x 10 hallway stairs   SL bridge 2 x 10 ea         TE x 10 min  Bike x 10 min  NMR x 10 min  SLB with knee flexion 2 x 30\" ea  Lat step down 2 x 10 4\" step     TE x 37  Bike x 10 mins L 2  Leg press x 6 cords 2 x 15   SL press 2 x 15 5 cords  HS curls x 15 ea green   Hip IR/ER x 12 ea bilaterally green   SLR x 12 5 sec hold 2#  SL hip abd x 12 5 sec hold 2#  SL hip flex to ext x 10 2#  Prone hip ext with knee flexion x 10 5 sec hold 2#   TE x 45 mins  Bike x 10 mins L 2  Lat step up and down x 10 6'' step  Front step up and down x 10 6'' step  SL bridges x 10 5 sec hold R  SL hip abd x 10 5 sec hold  Discussion on next steps with therapy and calling ortho for follow up, anatomy of the knee and kinesiology with no PCL and mechanics and pressure on meniscus     TE x 40 mins  Bike x 12 mins L 2  Walking lunges x 30' forward and backward  Lat walking x 30' band at thighs green, x 30' band at ankles green  Standing hip flexion x 15 ea green TB  Single leg sit to stand x 10 R   SLR x 15 5#  SL hip abd x 15 5#  Prone hip ext x 15 5#  Supine hip flex to abd x 10 ea           TE x 45 mins  Lat walking on TM x 2 mins red cuff  Sumo squats x 15  Single leg step up x 10 ea big black box  HS isometric curls x 10 5 sec hold ea  Sit to stand from small black box x 10  Airplanes x 10 ea   Side lying hip add x 12 2#   TE x 40 mins  Bike x 10 mins L 3  Leg press x 20 8 cords  SL press 2 x 15 8 cords  Donkey kicks on machine x 10 3 cords ea  Lat step down x 12 8'' step  Front step up with glute focus x 12 8'' step   Supine HS sliders x 10  SB  hip abd in half kneeling x 10 5 sec hold ea  SB bridge with SL HS curl x 10 ea   SLB tramp throws x 10 front and B sides     TE x 45 mins  Bike x 11 mins L 2  PCL testing with positive posterior drawer on R  Table 4 way SLR x 15 ea 5 sec holds 5#  SL bridges x 15 ea 5# on R  Donkey kicks on machine x 12 ea 3 cords        TE x 45 mins  Bike x 15 mins L 2  Discussion on following up with MD FIERRO on bosu 2 x 30 sec ea  Front lunges onto bosu 2 x 15 alternating  Side lunges onto bosu x 15 ea  Cross over step ups x 15 ea 8'' step  Lat step down with eccentric focus x 15 ea 8'' step  Eccentric posterior step down on bench x 10 ea                MT x 20 min  Fibular mob posterior grade 2-4, patellar mob grade 4, tibial posterior mob grade 4, myofascial release to lateral hamstring and ITB MT x 8 mins  STM to ITB and quad as well as HS            Charges: TE:3      Total Timed Treatment: 40 min  Total Treatment Time: 40 min

## 2024-01-30 ENCOUNTER — OFFICE VISIT (OUTPATIENT)
Dept: PHYSICAL THERAPY | Age: 19
End: 2024-01-30
Attending: ORTHOPAEDIC SURGERY
Payer: COMMERCIAL

## 2024-01-30 PROCEDURE — 97110 THERAPEUTIC EXERCISES: CPT | Performed by: PHYSICAL THERAPIST

## 2024-01-30 NOTE — PROGRESS NOTES
Dx: Rupture of posterior cruciate ligament of right knee, R knee pain           Authorized # of Visits:  10         Next MD visit:1/17/24  Fall Risk: low        Precautions: instability in knee with tear in PCL       Referring MD: Ben     Start of Service: 11/22/23     Subjective:   Pt reports that she is not having any residual pain following therapy sessions and no change in symptoms.    Pain 0-4/10   Objective:   5/5 strength in R LE with MMT    Assessment:   Pt with no issues on bike with increased resistance. Pt with good stability with single arm and single leg row and no reports of weakness on R compared to L. Pt with no issues with front step up with higher step height bilaterally. Pt able to complete lat jumps with mild LOB that is equal bilaterally but no pain and no valgus present. Pt with increased pain with single leg HS curl with bridge on R and had to stop and perform with B LE with no pain. Pt with good control and tech with single leg sit to stand from lower height and no valgus present. Pt is making steady overall progress and is on target for DC next week.       Goals: (To be met in 12 visits)   Pt will increase hip and knee strength to grossly 5/5 to be able to ambulate ambulate around school campus without pain.  Pt will demonstrate increased hip ER/ABD strength to 5/5 to perform stepping and squatting activities without excessive femoral IR/ADD.  Pt will be independent and compliant with comprehensive HEP to maintain progress achieved in PT.  Pt able to increase knee flexion strength to 5/5 on R side in order to improve gait mechanics during community ambulation.    Plan:   Pt to be able to self recognize knee valgus and progress strengthening as pt tolerates  Date: 1/4/24              TX#: 2/5 1/11/24  3/5 1/16/24  4/5 1/23/24  5/5 1/25/24  6/10 1/30/24  6/10   TE x 40 mins  Bike x 12 mins L 2  Walking lunges x 30' forward and backward  Lat walking x 30' band at thighs green, x 30' band at  ankles green  Standing hip flexion x 15 ea green TB  Single leg sit to stand x 10 R   SLR x 15 5#  SL hip abd x 15 5#  Prone hip ext x 15 5#  Supine hip flex to abd x 10 ea           TE x 45 mins  Lat walking on TM x 2 mins red cuff  Sumo squats x 15  Single leg step up x 10 ea big black box  HS isometric curls x 10 5 sec hold ea  Sit to stand from small black box x 10  Airplanes x 10 ea   Side lying hip add x 12 2#   TE x 40 mins  Bike x 10 mins L 3  Leg press x 20 8 cords  SL press 2 x 15 8 cords  Donkey kicks on machine x 10 3 cords ea  Lat step down x 12 8'' step  Front step up with glute focus x 12 8'' step   Supine HS sliders x 10  SB hip abd in half kneeling x 10 5 sec hold ea  SB bridge with SL HS curl x 10 ea   SLB tramp throws x 10 front and B sides     TE x 45 mins  Bike x 11 mins L 2  PCL testing with positive posterior drawer on R  Table 4 way SLR x 15 ea 5 sec holds 5#  SL bridges x 15 ea 5# on R  Donkey kicks on machine x 12 ea 3 cords        TE x 45 mins  Bike x 15 mins L 2  Discussion on following up with MD  SLB on bosu 2 x 30 sec ea  Front lunges onto bosu 2 x 15 alternating  Side lunges onto bosu x 15 ea  Cross over step ups x 15 ea 8'' step  Lat step down with eccentric focus x 15 ea 8'' step  Eccentric posterior step down on bench x 10 ea  TE x 45 mins  Bike x 10 mins L 3  SA/SL row on cable machine x 15 ea 10#  Front step up x 10 ea 14' step  Lat jump single leg and hold x 10 ea  Front to back single leg jumps x 10 ea  SB bridges with leg HS curl x 10 ea   SB hip abd x 10 5 sec hold  Single leg sit to stand from table x 10 ea                      Charges: TE:3      Total Timed Treatment: 45 min  Total Treatment Time: 45 min

## 2024-02-01 ENCOUNTER — OFFICE VISIT (OUTPATIENT)
Dept: PHYSICAL THERAPY | Age: 19
End: 2024-02-01
Attending: ORTHOPAEDIC SURGERY
Payer: COMMERCIAL

## 2024-02-01 PROCEDURE — 97110 THERAPEUTIC EXERCISES: CPT | Performed by: PHYSICAL THERAPIST

## 2024-02-01 NOTE — PROGRESS NOTES
Dx: Rupture of posterior cruciate ligament of right knee, R knee pain           Authorized # of Visits:  10         Next MD visit:1/17/24  Fall Risk: low        Precautions: instability in knee with tear in PCL       Referring MD: Ben     Start of Service: 11/22/23     Subjective:   Pt reports that she does not have any residual soreness following last tx session. Pt reports that she is busy with school and   Pain 0-4/10   Objective:   5/5 strength in R LE with MMT    Assessment:   Pt able to complete bike with no issues. Pt able to perform star pattern exercise with single leg balance. Pt with noted difficulty with some LOB but no pain or no knee valgus. Pt with noted difficulty with balance with more dynamic movements but able to complete with some UE support and more slow and controlled movements. Pt with more difficulty with hip flexion with R LE as stance. Pt with good static and dynamic stabilization but does reports some pressure in R knee compared to L with more dynamic movements and stabilization and mild discomfort at times with instability. Pt is supposed to make a follow up apt with MD soon but is on target to be DC from therapy next visit.       Goals: (To be met in 12 visits)   Pt will increase hip and knee strength to grossly 5/5 to be able to ambulate ambulate around school campus without pain.  Pt will demonstrate increased hip ER/ABD strength to 5/5 to perform stepping and squatting activities without excessive femoral IR/ADD.  Pt will be independent and compliant with comprehensive HEP to maintain progress achieved in PT.  Pt able to increase knee flexion strength to 5/5 on R side in order to improve gait mechanics during community ambulation.    Plan:   Pt to be DC next visit.   Date: 1/4/24              TX#: 2/5 1/11/24  3/5 1/16/24  4/5 1/23/24  5/5 1/25/24  6/10 1/30/24  6/10 2/1/24  7/10   TE x 40 mins  Bike x 12 mins L 2  Walking lunges x 30' forward and backward  Lat walking x 30' band  at thighs green, x 30' band at ankles green  Standing hip flexion x 15 ea green TB  Single leg sit to stand x 10 R   SLR x 15 5#  SL hip abd x 15 5#  Prone hip ext x 15 5#  Supine hip flex to abd x 10 ea           TE x 45 mins  Lat walking on TM x 2 mins red cuff  Sumo squats x 15  Single leg step up x 10 ea big black box  HS isometric curls x 10 5 sec hold ea  Sit to stand from small black box x 10  Airplanes x 10 ea   Side lying hip add x 12 2#   TE x 40 mins  Bike x 10 mins L 3  Leg press x 20 8 cords  SL press 2 x 15 8 cords  Donkey kicks on machine x 10 3 cords ea  Lat step down x 12 8'' step  Front step up with glute focus x 12 8'' step   Supine HS sliders x 10  SB hip abd in half kneeling x 10 5 sec hold ea  SB bridge with SL HS curl x 10 ea   SLB tramp throws x 10 front and B sides     TE x 45 mins  Bike x 11 mins L 2  PCL testing with positive posterior drawer on R  Table 4 way SLR x 15 ea 5 sec holds 5#  SL bridges x 15 ea 5# on R  Donkey kicks on machine x 12 ea 3 cords        TE x 45 mins  Bike x 15 mins L 2  Discussion on following up with MD FIERRO on bosu 2 x 30 sec ea  Front lunges onto bosu 2 x 15 alternating  Side lunges onto bosu x 15 ea  Cross over step ups x 15 ea 8'' step  Lat step down with eccentric focus x 15 ea 8'' step  Eccentric posterior step down on bench x 10 ea  TE x 45 mins  Bike x 10 mins L 3  SA/SL row on cable machine x 15 ea 10#  Front step up x 10 ea 14' step  Lat jump single leg and hold x 10 ea  Front to back single leg jumps x 10 ea  SB bridges with leg HS curl x 10 ea   SB hip abd x 10 5 sec hold  Single leg sit to stand from table x 10 ea  TE x 45 mins  Bike x 10 mins L 3  Star x 5 reps ea forward and backward   Bosu single leg balance with hip flex, abd and ext x 10 ea bilaterally red TB  SLR x 15 5 sec hold 5#  SL hip abd x 15 5 sec hold 5#  SL hip add x 15 5 sec hold 5#  Prone hip ext x 15 5 sec hold 5#                       Charges: TE:3      Total Timed Treatment: 45  min  Total Treatment Time: 45 min

## 2024-02-06 ENCOUNTER — OFFICE VISIT (OUTPATIENT)
Dept: PHYSICAL THERAPY | Age: 19
End: 2024-02-06
Attending: ORTHOPAEDIC SURGERY
Payer: COMMERCIAL

## 2024-02-06 PROCEDURE — 97110 THERAPEUTIC EXERCISES: CPT | Performed by: PHYSICAL THERAPIST

## 2024-02-06 NOTE — PROGRESS NOTES
Progress Summary  Pt has attended 8 visits in Physical Therapy.     Dx: Rupture of posterior cruciate ligament of right knee, R knee pain           Authorized # of Visits:  10         Next MD visit:1/17/24  Fall Risk: low        Precautions: instability in knee with tear in PCL       Referring MD: Ben     Start of Service: 11/22/23     Subjective:   Pt reports that she is ready for discharge but is still concerned with instability and discomfort in her R knee at times mainly with descending stairs and prolonged standing.    Pain 0-2/10   Objective:   5/5 strength in R LE with MMT    Assessment:   Pt reports that she is ready for DC and will follow up with ortho surgeon next week to discuss limitations and cont feelings of instability with closed knee ext and W sitting as well as sitting on buttocks with knees bent toward chest. Pt with good static strength with MMT and full AROM. Pt with positive PCL testing and demonstrates hypermobility into knee ext on B LE. Pt has a comprehensive HEP and is independent with performance and will be DC on this date. Pt reports a clicking or popping sound with standing hyperext of knee on right.       Goals: (To be met in 12 visits)   Pt will increase hip and knee strength to grossly 5/5 to be able to ambulate ambulate around school campus without pain.Met  Pt will demonstrate increased hip ER/ABD strength to 5/5 to perform stepping and squatting activities without excessive femoral IR/ADD. Met  Pt will be independent and compliant with comprehensive HEP to maintain progress achieved in PT. Met  Pt able to increase knee flexion strength to 5/5 on R side in order to improve gait mechanics during community ambulation. Met    Post LEFS Score  95% improved from SOC      Plan: D/C with continued compliance to HEP      Patient/Family/Caregiver was advised of these findings, precautions, and treatment options and has agreed to actively participate in planning and for this course of  care.    Thank you for your referral. If you have any questions, please contact me at Dept: [phone].    Sincerely,  Electronically signed by therapist: Thu Conner     Physician's certification required:  Yes  Please co-sign or sign and return this letter via fax as soon as possible to [phone].   I certify the need for these services furnished under this plan of treatment and while under my care.    X___________________________________________________ Date____________________    Certification From: 2/6/2024  To:5/6/2024     Plan:   Pt to be DC next visit.   Date: 1/4/24              TX#: 2/5 1/11/24  3/5 1/16/24  4/5 1/23/24  5/5 1/25/24  6/10 1/30/24  6/10 2/1/24  7/10 2/6/24  8/10   TE x 40 mins  Bike x 12 mins L 2  Walking lunges x 30' forward and backward  Lat walking x 30' band at thighs green, x 30' band at ankles green  Standing hip flexion x 15 ea green TB  Single leg sit to stand x 10 R   SLR x 15 5#  SL hip abd x 15 5#  Prone hip ext x 15 5#  Supine hip flex to abd x 10 ea           TE x 45 mins  Lat walking on TM x 2 mins red cuff  Sumo squats x 15  Single leg step up x 10 ea big black box  HS isometric curls x 10 5 sec hold ea  Sit to stand from small black box x 10  Airplanes x 10 ea   Side lying hip add x 12 2#   TE x 40 mins  Bike x 10 mins L 3  Leg press x 20 8 cords  SL press 2 x 15 8 cords  Donkey kicks on machine x 10 3 cords ea  Lat step down x 12 8'' step  Front step up with glute focus x 12 8'' step   Supine HS sliders x 10  SB hip abd in half kneeling x 10 5 sec hold ea  SB bridge with SL HS curl x 10 ea   SLB tramp throws x 10 front and B sides     TE x 45 mins  Bike x 11 mins L 2  PCL testing with positive posterior drawer on R  Table 4 way SLR x 15 ea 5 sec holds 5#  SL bridges x 15 ea 5# on R  Donkey kicks on machine x 12 ea 3 cords        TE x 45 mins  Bike x 15 mins L 2  Discussion on following up with MD FIERRO on bosu 2 x 30 sec ea  Front lunges onto bosu 2 x 15  alternating  Side lunges onto bosu x 15 ea  Cross over step ups x 15 ea 8'' step  Lat step down with eccentric focus x 15 ea 8'' step  Eccentric posterior step down on bench x 10 ea  TE x 45 mins  Bike x 10 mins L 3  SA/SL row on cable machine x 15 ea 10#  Front step up x 10 ea 14' step  Lat jump single leg and hold x 10 ea  Front to back single leg jumps x 10 ea  SB bridges with leg HS curl x 10 ea   SB hip abd x 10 5 sec hold  Single leg sit to stand from table x 10 ea  TE x 45 mins  Bike x 10 mins L 3  Star x 5 reps ea forward and backward   Bosu single leg balance with hip flex, abd and ext x 10 ea bilaterally red TB  SLR x 15 5 sec hold 5#  SL hip abd x 15 5 sec hold 5#  SL hip add x 15 5 sec hold 5#  Prone hip ext x 15 5 sec hold 5# TE x 45 mins  Bike x 10 mins L 3  Hopping, running and running with curves and turns.  Leg press x 20 8 cords   SL press x 15 8 cords ea  Donkey kicks on machine x 15 ea 2 cords  SLB on bosu x 30 sec ea  Bosu squats x 12  Bosu lat lunges x 12 ea  Front lunges x 12 ea onto bosu  Single leg bridges x 15 ea                         Charges: TE:3      Total Timed Treatment: 45 min  Total Treatment Time: 45 min

## 2024-02-16 ENCOUNTER — OFFICE VISIT (OUTPATIENT)
Dept: ORTHOPEDICS CLINIC | Facility: CLINIC | Age: 19
End: 2024-02-16
Payer: COMMERCIAL

## 2024-02-16 DIAGNOSIS — S83.521D RUPTURE OF POSTERIOR CRUCIATE LIGAMENT OF RIGHT KNEE, SUBSEQUENT ENCOUNTER: Primary | ICD-10-CM

## 2024-02-16 PROCEDURE — 99417 PROLNG OP E/M EACH 15 MIN: CPT | Performed by: ORTHOPAEDIC SURGERY

## 2024-02-16 PROCEDURE — 99215 OFFICE O/P EST HI 40 MIN: CPT | Performed by: ORTHOPAEDIC SURGERY

## 2024-02-16 NOTE — H&P
Orthopaedic Surgery  [address] [place] [address]  [phone]     PRE SURGICAL - HISTORY AND PHYSICAL EXAMINATION     Name: Arianna Brown   MRN: [id number]  Date: 2/16/2024     CC: Right Knee Pain and instability     REFERRED BY: Kelley Dela Cruz MD    HPI:   Arianna Brown is a very pleasant 18 year old female who presents today for evaluation of Right knee pain and instability and recent completion of MRI demonstrating posterior cruciate ligament rupture.     To summarize:  right knee pain and swelling after she was ice-skating on November 12, 2023 and fell directly onto her right knee.  She may have twisted her knee in the process of falling and has since had persistent swelling, stiffness and pain with walking and weightbearing.  She is otherwise very active and is a full-time school student studying psychology.  She presented to the immediate care in [place] and was referred to our service for further evaluation and management. She is a freshman in college. She enjoys wunderloop music.     Today, she is in office today for pain in right knee and has been going to physical therapy with slight improvement. Pain levels today are 5/10. Overall she is doing well. She mentions shaking of her knee when she attempts to fully  an extended motion or even when kneeling. She also mentions popping and clicking of the knee when moving. This makes the knee feels an immense amount of pressure and pain.     She works at a .     PMH:   Past Medical History:   Diagnosis Date    Anxiety     Depression        PAST SURGICAL HX:  History reviewed. No pertinent surgical history.    FAMILY HX:  Family History   Problem Relation Age of Onset    Breast Cancer Maternal Grandmother     Breast Cancer Maternal Aunt     Breast Cancer Maternal Great-Grandmother        ALLERGIES:  Patient has no known allergies.    MEDICATIONS:   Current Outpatient Medications   Medication Sig Dispense Refill    sertraline 50 MG  Oral Tab Take 1.5 tablets (75 mg total) by mouth daily.         ROS: A comprehensive 14 point review of systems was performed and was negative aside from the aforementioned per history of present illness.    SOCIAL HX:  Social History     Tobacco Use    Smoking status: Never    Smokeless tobacco: Never    Tobacco comments:     non-smoker   Substance Use Topics    Alcohol use: Never       PE:   There were no vitals filed for this visit.  Estimated body mass index is 21.37 kg/m² as calculated from the following:    Height as of 1/17/24: 5' 6\" (1.676 m).    Weight as of 1/19/24: 132 lb 6 oz (60 kg).    Physical Exam  Constitutional:       Appearance: Normal appearance.   HENT:      Head: Normocephalic and atraumatic.   Eyes:      Extraocular Movements: Extraocular movements intact.   Neck:      Musculoskeletal: Normal range of motion and neck supple.   Cardiovascular:      Pulses: Normal pulses.   Pulmonary:      Effort: Pulmonary effort is normal. No respiratory distress.   Abdominal:      General: There is no distension.   Skin:     General: Skin is warm.      Capillary Refill: Capillary refill takes less than 2 seconds.      Findings: No bruising.   Neurological:      General: No focal deficit present.      Mental Status: Alert.   Psychiatric:         Mood and Affect: Mood normal.     Examination of the right knee demonstrates:     Skin is intact, warm and dry.   Atrophy: mild    Effusion: none    Joint line tenderness: none  Crepitation: none   Clinton: Negative   Patellar mobility: normal without apprehension  J-sign: none    ROM: Extension full  Flexion 140 degrees  ACL:  Negative Lachman, Negative Pivot Shift   PCL:   2-3+ posterior drawer and subluxation and positive reverse pivot shift.  Collateral Ligaments: Stable to Varus and Valgus stress at 0 and 30 degrees  Strength: mild weakness   Hip joint: normal pain-free ROM   Gait:  normal   Leg length: equal and symmetric  Alignment:  neutral     No obvious  peripheral edema noted.   Distal neurovascular exam demonstrates normal perfusion, intact sensation to light touch and full strength.     Examination of the contralateral knee demonstrates:  No significant atrophy, swelling or effusion. Full range of motion. Neurovascularly intact distally.    Radiographic Examination/Diagnostics:  XR and MRI of the knee personally viewed, independently interpreted and radiology report was reviewed.      PROCEDURE:  MRI KNEE, RIGHT (WTK=05535)     COMPARISON:  None.     INDICATIONS:  S83.206A Positive Clinton test of right knee, initial encounter M23.91 Locked knee, right S80.01XA Contusion of right knee, initial encounter     TECHNIQUE:  Axial, coronal, and sagittal proton density with and without fat saturation images were obtained.     PATIENT STATED HISTORY: (As transcribed by Technologist)  Patient states ice skating injury on 11/13/23. She has difficulty with flexion of her right knee, pain, swelling, and bruising.         FINDINGS:    LIGAMENTS:          There is a full-thickness defect in the posterior cruciate ligament at the mid substance of the ligament as seen for example sagittal proton density sequence series 601, image 15. Full-thickness defect is also appreciated on coronal  T2 fat-sat sequence series 401, image 19. The ACL is intact.  Lateral ligamentous complex is intact.  Subchondral marrow edema is noted anterior lateral tibial plateau consistent with bone contusion.    MENISCI:            There is medial and anterior subluxation of body and anterior horn of medial meniscus respectively.  There is no focal defect detected although injury to root ligament attachment is suspected based on this finding.  TENDONS:            The tendinous insertions about the knee are intact without significant tendinosis or tears.  MUSCULATURE:        No evidence of strain, edema, or atrophy.  BONY COMPARTMENTS:  No evidence of chondromalacia or cartilage defects.  Normal marrow  and cortical signal.  SYNOVIUM:           There is a moderate joint effusion.                      Impression   CONCLUSION:    1. Full-thickness tear mid substance of PCL is noted.  2. Anterior and medial subluxation of anterior horn and body of medial meniscus respectively are noted.  Findings suggest injury to root ligaments although full-thickness defect is not detected.  3. Bone contusion anterior lateral tibial plateau.  4. Joint effusion.               IMPRESSION: Arianna Brown is a 18 year old female with posterior cruciate ligament rupture.  To successfully achieve goals of return to functional activity and avoiding future knee instability events, I advocate for posterior cruciate ligament reconstruction. Graft recommendation of allograft.    She has completed an extensive course of physician-directed physical therapy without symptomatic resolution.  Significant instability on physical examination as well as functional limitations in daily life.    PLAN:   We had a detailed discussion outlining the etiology, anatomy, pathophysiology, and natural history of posterior cruciate ligament injuries. Imaging was reviewed in detail and correlated to a 3-dimensional model of the knee.     I had a detailed discussion with the patient  regarding an overview in approach to posterior cruciate ligament injuries. We discussed the role of possible nonsurgical management versus operative PCL reconstruction, ultimately, we agreed that proceeding with surgical intervention would likely offer the best opportunity for symptomatic relief and functional recovery.     I used radiographic studies and a 3-dimensional model to outline her pathology, as well as general surgical principles. I outlined that the PCL is a dynamic structure that loses integrity after injury, which does not render it capable of repair.  As such, reconstruction is required with a separate structure to take the place of the native PCL.  This can be  accomplished via allograft, cadaveric tissue, or autograft-the patient's native tissue.     We reviewed the risks associated with arthroscopic-assisted posterior cruciate ligament (PCL) reconstruction. In particular we discussed risks that include, but are not limited to infection, potential transient or permanent injury to nerves or blood vessels, joint stiffness, persistent pain, need for future operation, failure of healing, wound complications, failure of therapeutic intervention, risk of re-injury, fixation failure, deep vein thrombosis and pulmonary embolism. We discussed the proposed rehabilitation timeline as well as expected postoperative restrictions. Arianna voiced a good understanding of treatment options, risks and benefits, postoperative instructions, rehabilitation timeline, and restrictions. She was given the opportunity to ask questions, which were all answered to the best of my ability and to her satisfaction. Arianna will work with my office to arrange a time for surgery and obtain any medical clearance information necessary. My pre-operative information packet, which details the process and answers many FAQ's will be provided. She was encouraged to call the office with any further questions or concerns.     I spent 60 minutes in preparation to see the patient, counseling/education of relevant pathology, discussing imaging results, ordering physical therapy intervention - including pre and post surgery, DME fitting, surgical counseling and care coordination.        FOLLOW-UP:  Post-Operative Visit, POD 6 with Sincer VALENCIA Covarrubias PA-C. Post op XR of the knee at this visit.         Jose G Contreras MD  Knee, Shoulder, & Elbow Surgery / Sports Medicine Specialist  Orthopaedic Surgery  [address] [place] [address]   [email or web address]  [email or web address]  t: [phone]  o: [phone]  f: [phone]      This note was dictated using Dragon software.  While it was briefly proofread prior  to completion, some grammatical, spelling, and word choice errors due to dictation may still occur.

## 2024-02-16 NOTE — PROGRESS NOTES
OR BOOKING SHEET KNEE  Location: [x] [facility]   [] [facility]  Name: Arianna Brown  MRN: [id number]   : 2005  Diagnosis:  [x] Rupture of posterior cruciate ligament of right knee, subsequent encounter [Z35.941C]  Disposition:    [x] Ambulatory  [] Overnight for JESSICA  [] Overnight for observation and pain control  [] Inpatient procedure    Operative Time Required: 2.5 hours  Procedure:  Antibiotics: 2 g cefazolin within 60 minutes of surgical incision (3 g if > 120 kg)  Laterality: [x] RIGHT  [] LEFT                       [] BILATERAL  Procedures:   [x] Arthroscopy  [] Arthrotomy (66963)  [] Arthroscopy/Arthrotomy     [x] PCL Reconstruction (64912)     [x] Cadaver: Tibialis Anterior vs Achilles allograft     [x] Synovectomy (30959)      Additional info:   [] PCP Clearance Needed  [] C - arm  [x] TXA at Time of Surgery  [x] Physical Therapy [facility] - [facility]  [x] DME Rx Needed  [] Appt with Dr. Contreras needed  Implants needed: Arthrex / Prather and Nephew  Positioning Equipment: Supine with Lateral Post

## 2024-02-16 NOTE — PROGRESS NOTES
Orthopaedic Surgery  [address] [place] [address]  [phone]     Name: Arianna Brown   MRN: [id number]  Date: 2/16/2024     REASON FOR VISIT: Right Knee Pain    INTERVAL HISTORY:   Arianna Brown is a very pleasant 18 year old female who returns today for right knee pain sustained November 12, 2023, due to a fall.    To Summarize, right knee pain and swelling after she was ice-skating on November 12, 2023 and fell directly onto her right knee.  She may have twisted her knee in the process of falling and has since had persistent swelling, stiffness and pain with walking and weightbearing.  She is otherwise very active and is a full-time school student studying psychology.  She presented to the immediate care in [place] and was referred to our service for further evaluation and management. She is a freshman in college. She enjoys Walk-in Appointment Scheduler.     Today, she is in office today for pain in right knee and has been going to physical therapy with slight improvement. Pain levels today are 5/10. Overall she is doing well. She mentions shaking of her knee when she attempts to fully  an extended motion or even when kneeling. She also mentions popping and clicking of the knee when moving. This makes the knee feels an immense amount of pressure and pain.    She works at a .    PE:   There were no vitals filed for this visit.  Estimated body mass index is 21.37 kg/m² as calculated from the following:    Height as of 1/17/24: 5' 6\" (1.676 m).    Weight as of 1/19/24: 132 lb 6 oz.    Physical Exam  Constitutional:       Appearance: Normal appearance.   HENT:      Head: Normocephalic and atraumatic.   Eyes:      Extraocular Movements: Extraocular movements intact.   Neck:      Musculoskeletal: Normal range of motion and neck supple.   Cardiovascular:      Pulses: Normal pulses.   Pulmonary:      Effort: Pulmonary effort is normal. No respiratory distress.   Abdominal:      General: There is no  distension.   Skin:     General: Skin is warm.      Capillary Refill: Capillary refill takes less than 2 seconds.      Findings: No bruising.   Neurological:      General: No focal deficit present.      Mental Status: She is alert.   Psychiatric:         Mood and Affect: Mood normal.     Examination of the right knee demonstrates:   - Tear of ACL   - Hypermobility of Joints     Radiographic Examination/Diagnostics:  MRI personally viewed, independently interpreted and radiology report was reviewed.    Narrative   PROCEDURE:  MRI KNEE, RIGHT (PUS=60983)     COMPARISON:  None.     INDICATIONS:  S83.206A Positive Clinton test of right knee, initial encounter M23.91 Locked knee, right S80.01XA Contusion of right knee, initial encounter     TECHNIQUE:  Axial, coronal, and sagittal proton density with and without fat saturation images were obtained.     PATIENT STATED HISTORY: (As transcribed by Technologist)  Patient states ice skating injury on 11/13/23. She has difficulty with flexion of her right knee, pain, swelling, and bruising.         FINDINGS:    LIGAMENTS:          There is a full-thickness defect in the posterior cruciate ligament at the mid substance of the ligament as seen for example sagittal proton density sequence series 601, image 15. Full-thickness defect is also appreciated on coronal  T2 fat-sat sequence series 401, image 19. The ACL is intact.  Lateral ligamentous complex is intact.  Subchondral marrow edema is noted anterior lateral tibial plateau consistent with bone contusion.    MENISCI:            There is medial and anterior subluxation of body and anterior horn of medial meniscus respectively.  There is no focal defect detected although injury to root ligament attachment is suspected based on this finding.  TENDONS:            The tendinous insertions about the knee are intact without significant tendinosis or tears.  MUSCULATURE:        No evidence of strain, edema, or atrophy.  BONY  COMPARTMENTS:  No evidence of chondromalacia or cartilage defects.  Normal marrow and cortical signal.  SYNOVIUM:           There is a moderate joint effusion.     Impression   CONCLUSION:    1. Full-thickness tear mid substance of PCL is noted.  2. Anterior and medial subluxation of anterior horn and body of medial meniscus respectively are noted.  Findings suggest injury to root ligaments although full-thickness defect is not detected.  3. Bone contusion anterior lateral tibial plateau.  4. Joint effusion.       LOCATION:  [facility]    Dictated by (CST): Scooter Enriquez MD on 11/19/2023 at 7:41 AM      Finalized by (CST): Scooter Enriquez MD on 11/19/2023 at 7:47 AM         IMPRESSION: Arianna Brown is a 18 year old female with rupture of posterior cruciate ligament of right knee sustained November 12, 2023, due to a fall.    In light of physical findings, we elected to schedule a surgery for course of treatment.     PLAN:   We reviewed the treatment of this disease condition.  Fortunately, treatment is amenable to conservative treatment which we chose to optimize at today's visit.  I recommended physical therapy to aid in strengthening, range of motion, functional improvement, and return to baseline activity.       The patient had the opportunity to ask questions and all questions were answered appropriately.  Patient is advised to continue physical therapy.     We discussed all possible treatment options and patient will be fitted with a brace in office today for future surgery.       FOLLOW-UP:   Return to clinic on an as needed basis.         Jose G Contreras MD  Knee, Shoulder, & Elbow Surgery / Sports Medicine Specialist  Orthopaedic Surgery  [address] [place] [address] [id number] [place] [email or web address]  [email or web address]  t: [phone]  o: [phone]  f: [phone]    This note was dictated using Dragon software.  While it was briefly proofread prior to completion, some grammatical, spelling, and word  choice errors due to dictation may still occur.

## 2024-02-19 ENCOUNTER — TELEPHONE (OUTPATIENT)
Dept: ORTHOPEDICS CLINIC | Facility: CLINIC | Age: 19
End: 2024-02-19

## 2024-02-19 DIAGNOSIS — S83.521D RUPTURE OF POSTERIOR CRUCIATE LIGAMENT OF RIGHT KNEE, SUBSEQUENT ENCOUNTER: Primary | ICD-10-CM

## 2024-02-19 NOTE — TELEPHONE ENCOUNTER
Date of Surgery:    2024    Post Op Appt:  2024 1:40    Case ID: [id number]     Notes:             OR BOOKING SHEET KNEE  Location: [x] [facility]                    [] [facility]  Name: Arianna Brown  MRN: [id number]   : 2005  Diagnosis:  [x] Rupture of posterior cruciate ligament of right knee, subsequent encounter [V16.030E]  Disposition:    [x] Ambulatory  [] Overnight for JESSICA  [] Overnight for observation and pain control  [] Inpatient procedure     Operative Time Required: 2.5 hours  Procedure:  Antibiotics: 2 g cefazolin within 60 minutes of surgical incision (3 g if > 120 kg)  Laterality:                  [x] RIGHT                  [] LEFT                          [] BILATERAL  Procedures:                    [x] Arthroscopy                               [] Arthrotomy (35732)                                   [] Arthroscopy/Arthrotomy                       [x] PCL Reconstruction (15527)                                       [x] Cadaver: Tibialis Anterior vs Achilles allograft                       [x] Synovectomy (00238)        Additional info:   [] PCP Clearance Needed  [] C - arm  [x] TXA at Time of Surgery  [x] Physical Therapy [facility] - [facility]  [x] DME Rx Needed  [] Appt with Dr. Contreras needed  Implants needed: Arthrex / Prather and Nephew  Positioning Equipment: Supine with Lateral Post

## 2024-02-20 NOTE — TELEPHONE ENCOUNTER
CALLED PATIENT AND WE SCHEDULED SURGERY, POST OP AND WENT OVER PRE OPERATIVE PROCEDURES. ALL QUESTIONS ANSWERED.

## 2024-03-06 ENCOUNTER — ULTRASOUND ENCOUNTER (OUTPATIENT)
Dept: OBGYN CLINIC | Facility: CLINIC | Age: 19
End: 2024-03-06
Payer: COMMERCIAL

## 2024-03-13 ENCOUNTER — TELEPHONE (OUTPATIENT)
Dept: ORTHOPEDICS CLINIC | Facility: CLINIC | Age: 19
End: 2024-03-13

## 2024-03-13 NOTE — TELEPHONE ENCOUNTER
Spoke with Grisel who fit patient with brace who stated that she is to start wearing the brace after surgery.  Called patient to let her know.  Patient stated that the person who dropped the brace off said this brae is for waling around and that she would probably get a different one after surgery.  Grisel will call patient and clarify the brace.

## 2024-03-15 ENCOUNTER — OFFICE VISIT (OUTPATIENT)
Dept: FAMILY MEDICINE CLINIC | Facility: CLINIC | Age: 19
End: 2024-03-15
Payer: COMMERCIAL

## 2024-03-15 VITALS
DIASTOLIC BLOOD PRESSURE: 70 MMHG | OXYGEN SATURATION: 98 % | HEIGHT: 66 IN | HEART RATE: 76 BPM | RESPIRATION RATE: 18 BRPM | TEMPERATURE: 98 F | SYSTOLIC BLOOD PRESSURE: 110 MMHG | BODY MASS INDEX: 21.53 KG/M2 | WEIGHT: 134 LBS

## 2024-03-15 DIAGNOSIS — L20.82 FLEXURAL ECZEMA: Primary | ICD-10-CM

## 2024-03-15 DIAGNOSIS — L20.9 ATOPIC DERMATITIS IN ADULT: ICD-10-CM

## 2024-03-15 PROCEDURE — 3078F DIAST BP <80 MM HG: CPT | Performed by: NURSE PRACTITIONER

## 2024-03-15 PROCEDURE — 99213 OFFICE O/P EST LOW 20 MIN: CPT | Performed by: NURSE PRACTITIONER

## 2024-03-15 PROCEDURE — 3074F SYST BP LT 130 MM HG: CPT | Performed by: NURSE PRACTITIONER

## 2024-03-15 PROCEDURE — 3008F BODY MASS INDEX DOCD: CPT | Performed by: NURSE PRACTITIONER

## 2024-03-15 RX ORDER — PREDNISONE 20 MG/1
20 TABLET ORAL 2 TIMES DAILY
Qty: 5 TABLET | Refills: 0 | Status: SHIPPED | OUTPATIENT
Start: 2024-03-15 | End: 2024-03-15

## 2024-03-15 RX ORDER — PREDNISONE 20 MG/1
20 TABLET ORAL 2 TIMES DAILY
COMMUNITY
Start: 2024-03-15

## 2024-03-15 RX ORDER — MOMETASONE FUROATE 1 MG/G
1 CREAM TOPICAL 2 TIMES DAILY PRN
Qty: 60 G | Refills: 1 | Status: SHIPPED | OUTPATIENT
Start: 2024-03-15

## 2024-03-15 NOTE — PROGRESS NOTES
HPI:   Rash  This is a recurrent problem. The current episode started 1 to 4 weeks ago. The problem has been waxing and waning since onset. The affected locations include the left elbow and right elbow. The rash is characterized by dryness and redness. She was exposed to nothing. Pertinent negatives include no fatigue, fever, joint pain or shortness of breath. Past treatments include anti-itch cream. The treatment provided no relief. There is no history of allergies or asthma.          Current Outpatient Medications   Medication Sig Dispense Refill    Mometasone Furoate 0.1 % External Cream Apply 1 Application topically 2 (two) times daily as needed. 60 g 1    predniSONE 20 MG Oral Tab Take 1 tablet (20 mg total) by mouth 2 (two) times daily for 7 days. 5 tablet 0    sertraline 50 MG Oral Tab Take 1.5 tablets (75 mg total) by mouth daily.        Past Medical History:   Diagnosis Date    Anxiety     Depression       No past surgical history on file.   Family History   Problem Relation Age of Onset    Breast Cancer Maternal Grandmother     Breast Cancer Maternal Aunt     Breast Cancer Maternal Great-Grandmother       Social History     Socioeconomic History    Marital status: Single   Tobacco Use    Smoking status: Never    Smokeless tobacco: Never    Tobacco comments:     non-smoker   Vaping Use    Vaping Use: Never used   Substance and Sexual Activity    Alcohol use: Never    Drug use: Never    Sexual activity: Never     Partners: Male   Other Topics Concern    Caffeine Concern Yes     Comment: 1-2x cups daily    Exercise No    Seat Belt Yes         REVIEW OF SYSTEMS:   Review of Systems   Constitutional:  Negative for fatigue and fever.   HENT:  Negative for facial swelling.    Respiratory:  Negative for shortness of breath.    Cardiovascular:  Negative for chest pain.   Musculoskeletal:  Negative for arthralgias, joint pain and joint swelling.   Skin:  Positive for rash.   Neurological:  Negative for dizziness,  light-headedness and headaches.       EXAM:   /70   Pulse 76   Temp 97.9 °F (36.6 °C) (Temporal)   Resp 18   Ht 5' 6\" (1.676 m)   Wt 134 lb (60.8 kg)   LMP 02/18/2024 (Exact Date)   SpO2 98%   BMI 21.63 kg/m²   Physical Exam  Constitutional:       General: She is not in acute distress.     Appearance: Normal appearance.   Cardiovascular:      Rate and Rhythm: Normal rate and regular rhythm.   Pulmonary:      Effort: Pulmonary effort is normal.      Breath sounds: Normal breath sounds.   Skin:     General: Skin is warm and dry.      Findings: Rash present.          Neurological:      Mental Status: She is alert.         ASSESSMENT AND PLAN:   Diagnoses and all orders for this visit:    Flexural eczema  -     Mometasone Furoate 0.1 % External Cream; Apply 1 Application topically 2 (two) times daily as needed.    Atopic dermatitis in adult  -     predniSONE 20 MG Oral Tab; Take 1 tablet (20 mg total) by mouth 2 (two) times daily for 7 days.       Patient was advised of medications sent to the pharmacy and directions for use. Patient was involved in plan of care and verbalized understanding.     Yolande GOEL RN, an APN student, has documented in this chart.

## 2024-03-15 NOTE — PROGRESS NOTES
Patient evaluated with student nurse practitioner  Independent physical examine performed  Agree with student nurse practitioner's plan of care  Appears to have eczema is bilateral antecubital regions  Acute dermatitis under arm, likely related to deodorant due to distrubution.   Discussed skin moisturizers and use of sensitive products  RTC if persisting or worsening

## 2024-03-19 ENCOUNTER — TELEPHONE (OUTPATIENT)
Dept: OBGYN CLINIC | Facility: CLINIC | Age: 19
End: 2024-03-19

## 2024-03-19 NOTE — TELEPHONE ENCOUNTER
Pt called, states Gyne US was done 03/07 and states she has yet to receive results. Please follow up with patient and advise. ty

## 2024-03-30 ENCOUNTER — HOSPITAL ENCOUNTER (OUTPATIENT)
Age: 19
Discharge: HOME | End: 2024-03-30
Payer: COMMERCIAL

## 2024-03-30 VITALS
HEART RATE: 110 BPM | TEMPERATURE: 98 F | RESPIRATION RATE: 15 BRPM | SYSTOLIC BLOOD PRESSURE: 130 MMHG | HEIGHT: 66 IN | WEIGHT: 130 LBS | OXYGEN SATURATION: 100 % | BODY MASS INDEX: 20.89 KG/M2 | DIASTOLIC BLOOD PRESSURE: 74 MMHG

## 2024-03-30 DIAGNOSIS — G43.909 MIGRAINE WITHOUT STATUS MIGRAINOSUS, NOT INTRACTABLE, UNSPECIFIED MIGRAINE TYPE: Primary | ICD-10-CM

## 2024-03-30 PROCEDURE — 99213 OFFICE O/P EST LOW 20 MIN: CPT | Performed by: PHYSICIAN ASSISTANT

## 2024-03-30 RX ORDER — BUTALBITAL, ACETAMINOPHEN AND CAFFEINE 50; 325; 40 MG/1; MG/1; MG/1
1-2 TABLET ORAL EVERY 6 HOURS PRN
Qty: 10 TABLET | Refills: 0 | Status: SHIPPED | OUTPATIENT
Start: 2024-03-30 | End: 2024-04-04

## 2024-03-30 RX ORDER — ONDANSETRON 4 MG/1
4 TABLET, ORALLY DISINTEGRATING ORAL EVERY 4 HOURS PRN
Qty: 10 TABLET | Refills: 0 | Status: SHIPPED | OUTPATIENT
Start: 2024-03-30 | End: 2024-04-06

## 2024-03-30 NOTE — DISCHARGE INSTRUCTIONS
Push clear fluids.  Attempt new medications.  If symptoms persist follow-up with primary care physician next week.  If it acutely worsens report to the ER

## 2024-03-30 NOTE — ED INITIAL ASSESSMENT (HPI)
Wed Pt started with migraine which was better after Ibuprofen.  Pt states HA returned on Thurs.  +nausea, photophobia    Denies: vomiting, vision issues

## 2024-03-30 NOTE — ED PROVIDER NOTES
Patient Seen in: [facility]      History     Chief Complaint   Patient presents with    Migraine     Stated Complaint: Headache    Subjective:   HPI    18-year-old female.  Medical history of spectrum.  Migraines.  POTS.  Patient arrives for evaluation of intermittent headache for the last 72 hours.  Typically, her migraines are 1 day of flares.  While this episode has been intermittent, she was concerned that it has lasted 3 days.  She currently rates the headache as a 2 out of 10.  Has a low-grade concurrent nausea.  Normal recent health.  No cough or cold symptoms.  No neck pain or stiffness.  No other focal deficits.  When she takes ibuprofen, the symptoms do  improve    Objective:   Past Medical History:   Diagnosis Date    Anxiety     Depression               History reviewed. No pertinent surgical history.             Social History     Socioeconomic History    Marital status: Single   Tobacco Use    Smoking status: Never    Smokeless tobacco: Never    Tobacco comments:     non-smoker   Vaping Use    Vaping Use: Never used   Substance and Sexual Activity    Alcohol use: Never    Drug use: Never    Sexual activity: Never     Partners: Male   Other Topics Concern    Caffeine Concern Yes     Comment: 1-2x cups daily    Exercise No    Seat Belt Yes              Review of Systems    Positive for stated complaint: Headache  Other systems are as noted in HPI.  Constitutional and vital signs reviewed.      All other systems reviewed and negative except as noted above.    Physical Exam     ED Triage Vitals [03/30/24 1421]   /74   Pulse 110   Resp 15   Temp 98.1 °F (36.7 °C)   Temp src Temporal   SpO2 100 %   O2 Device None (Room air)       Current:/74   Pulse 110   Temp 98.1 °F (36.7 °C) (Temporal)   Resp 15   Ht 167.6 cm (5' 6\")   Wt 59 kg   LMP 02/18/2024 (Exact Date)   SpO2 100%   BMI 20.98 kg/m²         Physical Exam    Gen: Well appearing, well groomed, alert and aware x 3  Neck:  Supple, full range of motion, no thyromegaly or lymphadenopathy.  Eye examination: EOMs are intact, normal conjunctival  ENT: No injection noted to the bilateral auditory canals; no loss of landmarks. Normal nasal mucosa without audible nasal congestion.  Oropharynx is patent without evidence of erythema, exudates or deviation.  No stridor to auscultation  Lung: No distress, RR, no retraction, breath sounds are clear bilaterally  Neuro: Omitting taste and smell, cranial nerves grossly intact    ED Course   Labs Reviewed - No data to display                 MDM          This is a well-appearing female.  Heart rate 110 but she associates this with her known POTS.  Afebrile.  No concurrent cough or cold symptoms.  Feels well.  No meningeal signs.  Headache is minimal at this time with low-grade nausea.  No recent travel.  No focal deficits.  No visual changes.    We will attempt Fioricet and Zofran.  Recommend she plan a close follow-up with the primary care physician.  For any acute worsening, thunderclap headache, focal deficits report to the ER.                               Medical Decision Making      Disposition and Plan     Clinical Impression:  1. Migraine without status migrainosus, not intractable, unspecified migraine type         Disposition:  Discharge  3/30/2024  2:34 pm    Follow-up:  Kelley Dela Cruz MD  [address] [address] [phone]                Medications Prescribed:  Current Discharge Medication List        START taking these medications    Details   butalbital-acetaminophen-caffeine -40 MG Oral Tab Take 1-2 tablets by mouth every 6 (six) hours as needed for Pain.  Qty: 10 tablet, Refills: 0    Associated Diagnoses: Migraine without status migrainosus, not intractable, unspecified migraine type      ondansetron 4 MG Oral Tablet Dispersible Take 1 tablet (4 mg total) by mouth every 4 (four) hours as needed for Nausea.  Qty: 10 tablet, Refills: 0

## 2024-03-31 ENCOUNTER — APPOINTMENT (OUTPATIENT)
Dept: CT IMAGING | Age: 19
End: 2024-03-31
Attending: NURSE PRACTITIONER
Payer: COMMERCIAL

## 2024-03-31 ENCOUNTER — HOSPITAL ENCOUNTER (EMERGENCY)
Age: 19
Discharge: HOME | End: 2024-03-31
Payer: COMMERCIAL

## 2024-03-31 VITALS
SYSTOLIC BLOOD PRESSURE: 132 MMHG | OXYGEN SATURATION: 100 % | HEART RATE: 82 BPM | DIASTOLIC BLOOD PRESSURE: 62 MMHG | WEIGHT: 130 LBS | BODY MASS INDEX: 20.89 KG/M2 | TEMPERATURE: 98 F | HEIGHT: 66 IN | RESPIRATION RATE: 18 BRPM

## 2024-03-31 DIAGNOSIS — R51.9 NONINTRACTABLE HEADACHE, UNSPECIFIED CHRONICITY PATTERN, UNSPECIFIED HEADACHE TYPE: Primary | ICD-10-CM

## 2024-03-31 DIAGNOSIS — R11.0 NAUSEA: ICD-10-CM

## 2024-03-31 DIAGNOSIS — R42 DIZZINESS: ICD-10-CM

## 2024-03-31 DIAGNOSIS — G44.209 ACUTE NON INTRACTABLE TENSION-TYPE HEADACHE: ICD-10-CM

## 2024-03-31 LAB
ALBUMIN SERPL-MCNC: 4.8 G/DL (ref 3.4–5)
ALBUMIN/GLOB SERPL: 1.3 {RATIO} (ref 1–2)
ALP LIVER SERPL-CCNC: 66 U/L
ALT SERPL-CCNC: 23 U/L
ANION GAP SERPL CALC-SCNC: 7 MMOL/L (ref 0–18)
AST SERPL-CCNC: 22 U/L (ref 15–37)
BASOPHILS # BLD AUTO: 0.02 X10(3) UL (ref 0–0.2)
BASOPHILS NFR BLD AUTO: 0.3 %
BILIRUB SERPL-MCNC: 0.3 MG/DL (ref 0.1–2)
BUN BLD-MCNC: 10 MG/DL (ref 9–23)
CALCIUM BLD-MCNC: 9.7 MG/DL (ref 8.5–10.1)
CHLORIDE SERPL-SCNC: 104 MMOL/L (ref 98–112)
CO2 SERPL-SCNC: 27 MMOL/L (ref 21–32)
CREAT BLD-MCNC: 0.86 MG/DL
EGFRCR SERPLBLD CKD-EPI 2021: 100 ML/MIN/1.73M2 (ref 60–?)
EOSINOPHIL # BLD AUTO: 0.12 X10(3) UL (ref 0–0.7)
EOSINOPHIL NFR BLD AUTO: 1.6 %
ERYTHROCYTE [DISTWIDTH] IN BLOOD BY AUTOMATED COUNT: 12.2 %
GLOBULIN PLAS-MCNC: 3.6 G/DL (ref 2.8–4.4)
GLUCOSE BLD-MCNC: 99 MG/DL (ref 70–99)
HCT VFR BLD AUTO: 45.4 %
HGB BLD-MCNC: 15.1 G/DL
IMM GRANULOCYTES # BLD AUTO: 0.02 X10(3) UL (ref 0–1)
IMM GRANULOCYTES NFR BLD: 0.3 %
LYMPHOCYTES # BLD AUTO: 2.3 X10(3) UL (ref 1.5–5)
LYMPHOCYTES NFR BLD AUTO: 31 %
MCH RBC QN AUTO: 28 PG (ref 26–34)
MCHC RBC AUTO-ENTMCNC: 33.3 G/DL (ref 31–37)
MCV RBC AUTO: 84.2 FL
MONOCYTES # BLD AUTO: 0.44 X10(3) UL (ref 0.1–1)
MONOCYTES NFR BLD AUTO: 5.9 %
NEUTROPHILS # BLD AUTO: 4.51 X10 (3) UL (ref 1.5–7.7)
NEUTROPHILS # BLD AUTO: 4.51 X10(3) UL (ref 1.5–7.7)
NEUTROPHILS NFR BLD AUTO: 60.9 %
OSMOLALITY SERPL CALC.SUM OF ELEC: 285 MOSM/KG (ref 275–295)
PLATELET # BLD AUTO: 377 10(3)UL (ref 150–450)
POTASSIUM SERPL-SCNC: 3.7 MMOL/L (ref 3.5–5.1)
PROT SERPL-MCNC: 8.4 G/DL (ref 6.4–8.2)
RBC # BLD AUTO: 5.39 X10(6)UL
SODIUM SERPL-SCNC: 138 MMOL/L (ref 136–145)
WBC # BLD AUTO: 7.4 X10(3) UL (ref 4–11)

## 2024-03-31 PROCEDURE — 96374 THER/PROPH/DIAG INJ IV PUSH: CPT

## 2024-03-31 PROCEDURE — 80053 COMPREHEN METABOLIC PANEL: CPT | Performed by: NURSE PRACTITIONER

## 2024-03-31 PROCEDURE — 96375 TX/PRO/DX INJ NEW DRUG ADDON: CPT

## 2024-03-31 PROCEDURE — 83550 IRON BINDING TEST: CPT

## 2024-03-31 PROCEDURE — 82728 ASSAY OF FERRITIN: CPT

## 2024-03-31 PROCEDURE — 84443 ASSAY THYROID STIM HORMONE: CPT

## 2024-03-31 PROCEDURE — 99284 EMERGENCY DEPT VISIT MOD MDM: CPT

## 2024-03-31 PROCEDURE — 85025 COMPLETE CBC W/AUTO DIFF WBC: CPT | Performed by: NURSE PRACTITIONER

## 2024-03-31 PROCEDURE — 70450 CT HEAD/BRAIN W/O DYE: CPT | Performed by: NURSE PRACTITIONER

## 2024-03-31 PROCEDURE — 83540 ASSAY OF IRON: CPT

## 2024-03-31 RX ORDER — KETOROLAC TROMETHAMINE 15 MG/ML
15 INJECTION, SOLUTION INTRAMUSCULAR; INTRAVENOUS ONCE
Status: COMPLETED | OUTPATIENT
Start: 2024-03-31 | End: 2024-03-31

## 2024-03-31 RX ORDER — DIPHENHYDRAMINE HYDROCHLORIDE 50 MG/ML
25 INJECTION INTRAMUSCULAR; INTRAVENOUS ONCE
Status: COMPLETED | OUTPATIENT
Start: 2024-03-31 | End: 2024-03-31

## 2024-03-31 RX ORDER — METOCLOPRAMIDE HYDROCHLORIDE 5 MG/ML
10 INJECTION INTRAMUSCULAR; INTRAVENOUS ONCE
Status: COMPLETED | OUTPATIENT
Start: 2024-03-31 | End: 2024-03-31

## 2024-04-01 ENCOUNTER — PATIENT OUTREACH (OUTPATIENT)
Dept: CASE MANAGEMENT | Age: 19
End: 2024-04-01

## 2024-04-01 NOTE — ED PROVIDER NOTES
Patient Seen in: [facility] [place]    History     Chief Complaint   Patient presents with    Headache     Stated Complaint: headache x 5 days with dizziness    HPI  Pt c/o a 7-8/10 headache that began 5 days ago.  Reports that she has a history of migraines however this feels completely different.  She reports that this is a lot of pressure in her head.  Patient is tearful.  Patient was seen in the immediate care, discharged home with headache is associated with Zofran and Fioricet with no relief of her symptoms.  Patient denies fever, stiff neck, visual changes or any focal neuro deficit.      Past Medical History:   Diagnosis Date    Anxiety     Depression        History reviewed. No pertinent surgical history.         Family History   Problem Relation Age of Onset    Breast Cancer Maternal Grandmother     Breast Cancer Maternal Aunt     Breast Cancer Maternal Great-Grandmother        Social History     Socioeconomic History    Marital status: Single   Tobacco Use    Smoking status: Never    Smokeless tobacco: Never    Tobacco comments:     non-smoker   Vaping Use    Vaping Use: Never used   Substance and Sexual Activity    Alcohol use: Never    Drug use: Never    Sexual activity: Never     Partners: Male   Other Topics Concern    Caffeine Concern Yes     Comment: 1-2x cups daily    Exercise No    Seat Belt Yes       Review of Systems    Positive for stated complaint: headache x 5 days with dizziness  Other systems are as noted in HPI.  Constitutional and vital signs reviewed.      All other systems reviewed and negative except as noted above.    PSFH elements reviewed from today and agreed except as otherwise stated in HPI.    Physical Exam     ED Triage Vitals [03/31/24 1745]   /87   Pulse 92   Resp 18   Temp 97.9 °F (36.6 °C)   Temp src Oral   SpO2 99 %   O2 Device        Current:/87   Pulse 92   Temp 97.9 °F (36.6 °C) (Oral)   Resp 18   Ht 167.6 cm (5' 6\")   Wt 59 kg    LMP 03/20/2024 (Approximate)   SpO2 99%   BMI 20.98 kg/m²   PULSE OX 99% on room air  Constitutional:  No acute distress  HEENT:  Head normocephalic and atraumatic. No scleral icterus or erythema. Pharynx moist without erythema or exudate.  CV:  Regular rate and rhythm. No murmur. Peripheral pulses intact.  Respiratory:  Lungs clear to auscultation bilaterally  Abdomen:  Soft, non-tender, non-distended.  Back:  No CVA or vertebral tenderness  Skin:  Normal color. Warm and Dry  Extremities:  Non-tender. No pedal edema.   Neuro:  Oriented x3.  PERRL, EOMI. CN II - XII grossly intact.  No gross motor deficits.  5/5 strength in all distribution.  Sensation fully intact.      DDX to include tension headache vs. Migraine headache vs. Sinusitis vs. SAH        ED Course     Labs Reviewed   COMP METABOLIC PANEL (14) - Abnormal; Notable for the following components:       Result Value    Total Protein 8.4 (*)     All other components within normal limits   CBC W/ DIFFERENTIAL - Abnormal; Notable for the following components:    RBC 5.39 (*)     All other components within normal limits   CBC WITH DIFFERENTIAL WITH PLATELET    Narrative:     The following orders were created for panel order CBC With Differential With Platelet.  Procedure                               Abnormality         Status                     ---------                               -----------         ------                     CBC W/ DIFFERENTIAL[[id number]]          Abnormal            Final result                 Please view results for these tests on the individual orders.       MDM         Patient was given IV fluids, Reglan, Benadryl, Toradol with relief of her symptoms.  All labs are reviewed and within normal limits.  The CT of her brain shows no acute intracranial abnormalities.  Patient discharged home in stable condition to follow-up with her primary care physician and to return to the emergency department with any worsening symptoms or  concerns    Patient presenting with headache.  Patient  with no abnormal symptoms for patient.  Patient required frequent reassessment of condition.  Patient was discharged home. Patient advised to return to ER if alteration in mental status, onset of fevers, visual changes, or peripheral weakness/numbness/tingling.  Disposition and Plan     Clinical Impression:  1. Nonintractable headache, unspecified chronicity pattern, unspecified headache type        Disposition:  Discharge    Follow-up:  Kelley Dela Cruz MD  [address] [address] [phone]    Follow up        Medications Prescribed:  Current Discharge Medication List

## 2024-04-01 NOTE — PROGRESS NOTES
1st attempt ER f/up apt request    Dr. White  PCP  [address]   [facility]   [phone]  Apt: April 5 @4pm     Confirmed w/ pt  Closing encounter

## 2024-04-01 NOTE — DISCHARGE INSTRUCTIONS
Headache:     -Acetaminophen 650 mg orally every 4 hours as needed for pain.  Do not exceed 4000 mg in 24 hours.     -Ibuprofen 600 mg orally every 6 hours as needed for pain.  Take with food.  Discontinue use and seek medical attention with blood in vomit or stool, coffee-ground vomit, or dark black stool. .     -Rest in a quiet dark room with minimal stimulation.     -Follow-up with the primary care provider within 3-5 days reassessment.     -Please monitor for and seek medical attention with severe worsening headache, vision changes, numbness, tingling, loss sensation, weakness, difficulties ambulate, difficulty speaking, fevers, neck pain/stiffness, or any other concerns.

## 2024-04-02 ENCOUNTER — OFFICE VISIT (OUTPATIENT)
Dept: FAMILY MEDICINE CLINIC | Facility: CLINIC | Age: 19
End: 2024-04-02
Payer: COMMERCIAL

## 2024-04-02 VITALS
RESPIRATION RATE: 16 BRPM | BODY MASS INDEX: 20.73 KG/M2 | SYSTOLIC BLOOD PRESSURE: 118 MMHG | DIASTOLIC BLOOD PRESSURE: 80 MMHG | HEIGHT: 66 IN | HEART RATE: 123 BPM | OXYGEN SATURATION: 98 % | TEMPERATURE: 99 F | WEIGHT: 129 LBS

## 2024-04-02 DIAGNOSIS — H65.192 ACUTE MEE (MIDDLE EAR EFFUSION), LEFT: ICD-10-CM

## 2024-04-02 DIAGNOSIS — G44.209 ACUTE NON INTRACTABLE TENSION-TYPE HEADACHE: ICD-10-CM

## 2024-04-02 DIAGNOSIS — R42 DIZZINESS: Primary | ICD-10-CM

## 2024-04-02 DIAGNOSIS — R11.0 NAUSEA: ICD-10-CM

## 2024-04-02 LAB
DEPRECATED HBV CORE AB SER IA-ACNC: 44.6 NG/ML
IRON SATN MFR SERPL: 13 %
IRON SERPL-MCNC: 57 UG/DL
TIBC SERPL-MCNC: 423 UG/DL (ref 240–450)
TRANSFERRIN SERPL-MCNC: 284 MG/DL (ref 200–360)
TSI SER-ACNC: 1.7 MIU/ML (ref 0.36–3.74)

## 2024-04-02 PROCEDURE — 3074F SYST BP LT 130 MM HG: CPT | Performed by: NURSE PRACTITIONER

## 2024-04-02 PROCEDURE — 3079F DIAST BP 80-89 MM HG: CPT | Performed by: NURSE PRACTITIONER

## 2024-04-02 PROCEDURE — 99214 OFFICE O/P EST MOD 30 MIN: CPT | Performed by: NURSE PRACTITIONER

## 2024-04-02 PROCEDURE — 3008F BODY MASS INDEX DOCD: CPT | Performed by: NURSE PRACTITIONER

## 2024-04-02 RX ORDER — FLUTICASONE PROPIONATE 50 MCG
2 SPRAY, SUSPENSION (ML) NASAL DAILY
Qty: 9.9 ML | Refills: 0 | Status: SHIPPED | OUTPATIENT
Start: 2024-04-02 | End: 2025-03-28

## 2024-04-02 RX ORDER — ONDANSETRON 4 MG/1
4 TABLET, FILM COATED ORAL EVERY 8 HOURS PRN
Qty: 20 TABLET | Refills: 2 | Status: SHIPPED | OUTPATIENT
Start: 2024-04-02

## 2024-04-02 RX ORDER — MECLIZINE HCL 12.5 MG/1
12.5 TABLET ORAL 2 TIMES DAILY PRN
Qty: 10 TABLET | Refills: 0 | Status: SHIPPED | OUTPATIENT
Start: 2024-04-02

## 2024-04-02 NOTE — PROGRESS NOTES
CHIEF COMPLAINT:    Chief Complaint   Patient presents with    ER F/U     [facility] for migraines, now having dizzy, nauseous, fatigue       HISTORY OF PRESENT ILLNESS:    Arianna who has a history of POTS, raynaud's, iron deficiency, VISHNU, and ADHD, presents today, April 02, 2024, for ER follow-up.      Arianna sought care 03/31/2024 for 5 day history of new pattern headache with dizziness.  Pain began on 03/27/2024, described as \"normal headache\" and resolved after taking ibuprofen.  The next morning on 02/29/2024, Arianna experienced a \"really bad headache\" upon awakening.  Headache described as \"heaviness, tight, pressure wrapping around head.  Dizziness is made worse with position changes.  Positive for nausea, fatigue, shakiness, difficulty sleeping and loss of appetite.  Pain is rated 8/10.  Admits to some inconsistent use of sertraline and iron supplements.    Mom present during exam via video visit, reports fhx of thyroid disease.  Also mentions seizure in the 5th grade, which she says was induced by medication.    Denies changes in vision, changes in hearing, popping/clicking in ears.    LMP 03/16/2024    ALLERGIES:  No Known Allergies    CURRENT MEDICATIONS:  Current Outpatient Medications   Medication Sig Dispense Refill    ondansetron 4 MG Oral Tablet Dispersible Take 1 tablet (4 mg total) by mouth every 4 (four) hours as needed for Nausea. 10 tablet 0    sertraline 50 MG Oral Tab Take 1.5 tablets (75 mg total) by mouth daily.         MEDICAL HISTORY:  Past Medical History:   Diagnosis Date    Anxiety     Depression      No past surgical history on file.  Family History   Problem Relation Age of Onset    Breast Cancer Maternal Grandmother     Breast Cancer Maternal Aunt     Breast Cancer Maternal Great-Grandmother      Family Status   Relation Status    MGMA (Not Specified)    Mat Aunt Other    Mat Great GM (Not Specified)     Social History     Socioeconomic History    Marital status: Single   Tobacco Use     Smoking status: Never    Smokeless tobacco: Never    Tobacco comments:     non-smoker   Vaping Use    Vaping Use: Never used   Substance and Sexual Activity    Alcohol use: Never    Drug use: Never    Sexual activity: Never     Partners: Male   Other Topics Concern    Caffeine Concern Yes     Comment: 1-2x cups daily    Exercise No    Seat Belt Yes       ROS:  GENERAL:  Denies recorded temperatures greater than 100.5F  RESPIRATORY:  Denies difficulty breathing  CARDIAC:  Denies chest pain with exertion    VITALS:   /80   Pulse (!) 123   Temp 99.4 °F (37.4 °C) (Temporal)   Resp 16   Ht 5' 6\" (1.676 m)   Wt 129 lb (58.5 kg)   LMP 03/20/2024 (Approximate)   SpO2 98%   BMI 20.82 kg/m²     Reviewed by Elise Hicks MS, APRN, FNP-BC    PHYSICAL EXAM:    Constitutional:       Appears tired.  Sitting upright on exam table.  Well developed, well nourished, and in no acute distress  HEENT:      Facial features symmetric. Normocephalic and atraumatic     Sclera anicteric.  EOMs intact without nystagmus.  Pupils round and equal.  Ears:      Bilateral canals clear and without drainage or erythema.      TMs intact.  Left TM with tiny effusions.  TMs neutral in position.  Grossly normal hearing.    Cardiovascular:      Heart sounds: Tachycardic, regular rhythm without murmur  Pulmonary:      Chest expansion symmetric.  Breathing nonlabored. Lungs clear throughout  Neurological:      Cranial nerves II through XII intact.      General: No focal deficit present. Speech is clear and organized.     Mental Status: Alert, demonstrates ability to recall information from past and present events     Sensory: Sensation is intact.      Motor: Motor function is intact. Movements are smooth and controlled without ataxia.     Coordination: Coordination is intact. Coordination normal.      Gait: Gait is intact. Gait steady and nonantalgic.      Deep Tendon Reflexes: Reflexes 2+ bilaterally.  Musculoskeletal:         Movements  smooth and controlled with appropriate coordination.       Gait intact, steady, nonantalgic.  Skin:     Warm and dry without discoloration.  Psychiatric:         Alert and oriented.  Calm and cooperative.  Speech is clear.     ASSESSMENT & PLAN:    Reinforced hydration, Pedialyte popsicles  Reinforced consistent use of sertraline  Flonase for left ear effusion/ddx bppv  Labs to assess iron levels and thyroid d/t hx and fhx  Meclizine for dizziness  Zofran for nausea    1. Dizziness  - Ferritin; Future  - Iron And Tibc; Future  - TSH W Reflex To Free T4 [E]; Future  - fluticasone propionate 50 MCG/ACT Nasal Suspension; 2 sprays by Each Nare route daily.  Dispense: 9.9 mL; Refill: 0  - meclizine 12.5 MG Oral Tab; Take 1 tablet (12.5 mg total) by mouth 2 (two) times daily as needed for Dizziness.  Dispense: 10 tablet; Refill: 0  - Physical Therapy Referral - [facility]  - Neuro Referral - In Network    2. Acute non intractable tension-type headache  - Ferritin; Future  - Iron And Tibc; Future  - TSH W Reflex To Free T4 [E]; Future  - fluticasone propionate 50 MCG/ACT Nasal Suspension; 2 sprays by Each Nare route daily.  Dispense: 9.9 mL; Refill: 0  - Neuro Referral - In Network    3. Nausea  - Ferritin; Future  - Iron And Tibc; Future  - TSH W Reflex To Free T4 [E]; Future  - ondansetron (ZOFRAN) 4 mg tablet; Take 1 tablet (4 mg total) by mouth every 8 (eight) hours as needed for Nausea.  Dispense: 20 tablet; Refill: 2    4. Acute DEIRDRE (middle ear effusion), left  - fluticasone propionate 50 MCG/ACT Nasal Suspension; 2 sprays by Each Nare route daily.  Dispense: 9.9 mL; Refill: 0  - Physical Therapy Referral - [facility]    See wrap up summary:    Flonase to help open up tubes that drain the ears - bubbles in left ear (ear effusion) can contribute to dizziness    If no improvement with flonase x 3-5 days, schedule PT    Zofran for nausea    Meclizine for dizziness    Follow-up with neurology    We will await  iron studies and thyroid hormone levels - we will notify you of results    Take sertraline consistently, if not doing so can cause migraines and make you feel unwell    Stay hydrated and try to eat consistent meals    Some foods to help with stomach upset include avocado, walnuts, pears, oatmeal, and yogurt

## 2024-04-02 NOTE — PATIENT INSTRUCTIONS
Flonase to help open up tubes that drain the ears - bubbles in left ear (ear effusion) can contribute to dizziness    If no improvement with flonase x 3-5 days, schedule PT    Zofran for nausea    Meclizine for dizziness    Follow-up with neurology    We will await iron studies and thyroid hormone levels - we will notify you of results    Take sertraline consistently, if not doing so can cause migraines and make you feel unwell    Stay hydrated and try to eat consistent meals    Some foods to help with stomach upset include avocado, walnuts, pears, oatmeal, and yogurt

## 2024-04-03 ENCOUNTER — OFFICE VISIT (OUTPATIENT)
Dept: NEUROLOGY | Facility: CLINIC | Age: 19
End: 2024-04-03
Payer: COMMERCIAL

## 2024-04-03 ENCOUNTER — TELEPHONE (OUTPATIENT)
Dept: FAMILY MEDICINE CLINIC | Facility: CLINIC | Age: 19
End: 2024-04-03

## 2024-04-03 VITALS
DIASTOLIC BLOOD PRESSURE: 68 MMHG | RESPIRATION RATE: 16 BRPM | HEART RATE: 74 BPM | SYSTOLIC BLOOD PRESSURE: 120 MMHG | BODY MASS INDEX: 21 KG/M2 | WEIGHT: 128.5 LBS

## 2024-04-03 DIAGNOSIS — G43.709 CHRONIC MIGRAINE W/O AURA W/O STATUS MIGRAINOSUS, NOT INTRACTABLE: Primary | ICD-10-CM

## 2024-04-03 PROCEDURE — 3074F SYST BP LT 130 MM HG: CPT | Performed by: OTHER

## 2024-04-03 PROCEDURE — 99204 OFFICE O/P NEW MOD 45 MIN: CPT | Performed by: OTHER

## 2024-04-03 PROCEDURE — 3078F DIAST BP <80 MM HG: CPT | Performed by: OTHER

## 2024-04-03 RX ORDER — METHYLPREDNISOLONE 4 MG/1
TABLET ORAL
Qty: 1 EACH | Refills: 0 | Status: SHIPPED | OUTPATIENT
Start: 2024-04-03

## 2024-04-03 RX ORDER — SUMATRIPTAN 25 MG/1
TABLET, FILM COATED ORAL
Qty: 9 TABLET | Refills: 0 | Status: SHIPPED | OUTPATIENT
Start: 2024-04-03

## 2024-04-03 NOTE — PROGRESS NOTES
[facility] CONSULTATION    Date of consult: 4/3/2024    Assessment:    ICD-10-CM    1. Chronic migraine w/o aura w/o status migrainosus, not intractable  G43.709 MRI BRAIN (CPT=40551) [[id number]]     EEG (At [facility])        Plan:      Procedures    MRI BRAIN (CPT=40551) [[id number]]   EEG  Medrol dose edel  Hydration and rest  Imitrex prn (states fioricet not helping)   Zofran prn for nausea  See orders and medications filed with this encounter. The patient indicates understanding of these issues and agrees with the plan.  Discussed with patient/family regarding assessment, work up, care plan   RTC 2 months, may consider propranolol in he future for migraine prevention.  Pt should go ER for any new or worsening symptoms and contact office    Subjective:   CC/Reason for consult: migraine   Consult Requested by Kelley Dela Cruz MD    HPI: Arianna Brown is a 18 year old female with past medical history as listed below presents here for initial evaluation of HA and nausea which began 6 days ago. She also has nausea, head pressure, and dizziness, was in ER yesterday, mom is here with pt. Pt seems anxious, a bit shaky, CT head was negative. Has remote history of seizure, but no seizure in the recent years.    History/Other:   REVIEW OF SYSTEMS:  A comprehensive 14-point system was reviewed. Pertinent positives and negatives are noted in HPI.       Current Outpatient Medications:     methylPREDNISolone (MEDROL) 4 MG Oral Tablet Therapy Pack, Take as directed, Disp: 1 each, Rfl: 0    SUMAtriptan 25 MG Oral Tab, Use at onset; repeat once after 2 hours-ONLY 2 IN 24 HR MAX.  This is a 30 day supply., Disp: 9 tablet, Rfl: 0    ondansetron (ZOFRAN) 4 mg tablet, Take 1 tablet (4 mg total) by mouth every 8 (eight) hours as needed for Nausea., Disp: 20 tablet, Rfl: 2    fluticasone propionate 50 MCG/ACT Nasal Suspension, 2 sprays by Each Nare route daily., Disp: 9.9 mL, Rfl: 0    meclizine 12.5 MG Oral Tab, Take 1  tablet (12.5 mg total) by mouth 2 (two) times daily as needed for Dizziness., Disp: 10 tablet, Rfl: 0    sertraline 50 MG Oral Tab, Take 1.5 tablets (75 mg total) by mouth daily., Disp: , Rfl:   Allergies:  No Known Allergies  Past Medical History:   Diagnosis Date    Anxiety     Depression      History reviewed. No pertinent surgical history.  Social History:  Social History     Tobacco Use    Smoking status: Never    Smokeless tobacco: Never    Tobacco comments:     non-smoker   Substance Use Topics    Alcohol use: Never     Family History   Problem Relation Age of Onset    Breast Cancer Maternal Grandmother     Breast Cancer Maternal Aunt     Breast Cancer Maternal Great-Grandmother       Objective:   Physical Examination:  /68   Pulse 74   Resp 16   Wt 128 lb 8 oz (58.3 kg)   LMP 03/20/2024 (Approximate)   BMI 20.74 kg/m²   General: Awake and alert  HEENT: Eye sclerae are anicteric; scalp is atraumatic  Neck: Supple  Cardiac: Regular rate and regular rhythm  Lungs: Clear   Abdomen:  non-tender  Extremities: No clubbing or cyanosis; moves extremities   Psychiatric: anxious  Dermatologic: No rashes; no edema  Neurological Examination:  Language: normal   Speech: no dysarthria  CN: II-XII intact  Motor strength: 5/5 all extremities  Tone: normal  DTRs: 2+ symmetric  Coordination: Normal   Sensory: symmetric   Gait: nl    Data Reviewed on 4/3/2024  Notes Reviewed on 4/3/2024  Labs Reviewed  on 4/3/2024    Yoni Hines MD (Michael)   Neurology  [facility]  4/3/2024, 1:59 PM  Consultation Report: being sent/fax/route to requesting provider   CC: Kelley Dela Cruz MD

## 2024-04-03 NOTE — TELEPHONE ENCOUNTER
----- Message from SHANTHI Macias sent at 4/3/2024 11:37 AM CDT -----  Iron levels are stable - iron has improved compared to a year ago but still on the lower end - recommending to start up iron supplement, take it every other day (consider Monday, Wednesday, Fridays)    TSH - thyroid stimulating hormone is stable

## 2024-04-03 NOTE — PATIENT INSTRUCTIONS
Refill policies:    Allow 2-3 business days for refills; controlled substances may take longer.  Contact your pharmacy at least 5 days prior to running out of medication and have them send an electronic request or submit request through the “request refill” option in your [facility] account.  Refills are not addressed on weekends; covering physicians do not authorize routine medications on weekends.  No narcotics or controlled substances are refilled after noon on Fridays or by on call physicians.  By law, narcotics must be electronically prescribed.  A 30 day supply with no refills is the maximum allowed.  If your prescription is due for a refill, you may be due for a follow up appointment.  To best provide you care, patients receiving routine medications need to be seen at least once a year.  Patients receiving narcotic/controlled substance medications need to be seen at least once every 3 months.  In the event that your preferred pharmacy does not have the requested medication in stock (e.g. Backordered), it is your responsibility to find another pharmacy that has the requested medication available.  We will gladly send a new prescription to that pharmacy at your request.    Scheduling Tests:    If your physician has ordered radiology tests such as MRI or CT scans, please contact Central Scheduling at [phone] right away to schedule the test.  Once scheduled, the [facility] Centralized Referral Team will work with your insurance carrier to obtain pre-certification or prior authorization.  Depending on your insurance carrier, approval may take 3-10 days.  It is highly recommended patients assure they have received an authorization before having a test performed.  If test is done without insurance authorization, patient may be responsible for the entire amount billed.      Precertification and Prior Authorizations:  If your physician has recommended that you have a procedure or additional testing performed the [facility]  Centralized Referral Team will contact your insurance carrier to obtain pre-certification or prior authorization.    You are strongly encouraged to contact your insurance carrier to verify that your procedure/test has been approved and is a COVERED benefit.  Although the [facility] Centralized Referral Team does its due diligence, the insurance carrier gives the disclaimer that \"Although the procedure is authorized, this does not guarantee payment.\"    Ultimately the patient is responsible for payment.   Thank you for your understanding in this matter.  Paperwork Completion:  If you require FMLA or disability paperwork for your recovery, please make sure to either drop it off or have it faxed to our office at [phone]. Be sure the form has your name and date of birth on it.  The form will be faxed to our Forms Department and they will complete it for you.  There is a 25$ fee for all forms that need to be filled out.  Please be aware there is a 10-14 day turnaround time.  You will need to sign a release of information (DONTE) form if your paperwork does not come with one.  You may call the Forms Department with any questions at [phone].  Their fax number is [phone].

## 2024-04-05 ENCOUNTER — TELEPHONE (OUTPATIENT)
Dept: FAMILY MEDICINE CLINIC | Facility: CLINIC | Age: 19
End: 2024-04-05

## 2024-04-05 RX ORDER — ONDANSETRON 4 MG/1
TABLET, ORALLY DISINTEGRATING ORAL
COMMUNITY
Start: 2024-04-03

## 2024-04-05 NOTE — TELEPHONE ENCOUNTER
Pt mother called said that pt has been seen twice in the ER and saw the Elise and neurology. Pt has been put and a lot of medication. Mom wants to know if its ok to be taken all together.  Pt is still having her dizziness and nausea also seeing spots. Per mom she was told by neuro that pt she have been admitted to hospital or see pcp        Mom on verbal

## 2024-04-05 NOTE — TELEPHONE ENCOUNTER
Flonase  Meclizine  Zofran  Setraline  Methylprednisone  Sumatriptan    There are no absolute contraindications.  All above medications can be taken together.  Sumatriptan and zofran may increase the effect of sertraline.  However these two medications are used as needed and not on a daily basis so risk is low (risk increases the more frequent and higher the doses).  Monitor for tremors, shaking, excessive sweating, and mental status changes.

## 2024-04-05 NOTE — TELEPHONE ENCOUNTER
MOM CALLED BACK AND ADV HAS SOME QUESTIONS ABOUT MEDICATION AND WOULD LIKE TO  KNOW IF ITS OK TO TAKE MEDS THAT LYNSEY PRESCRIBED AND DR SILVER.    DID GET MUCH HELP OR DIRECTION WHEN PT SAW DR SILVER.    PLEASE CLARIFY - PT STILL IS HAVING HEAD PRESSURE    THANK YOU

## 2024-04-05 NOTE — TELEPHONE ENCOUNTER
I recommend they also run this past their pharmacist as well for reassurance    Flonase  Meclizine  Zofran  Setraline  Methylprednisone  Sumatriptan     There are no absolute contraindications.  All above medications can be taken together.  Sumatriptan and zofran may increase the effect of sertraline.  However these two medications are used as needed and not on a daily basis so risk is low (risk increases the more frequent and higher the doses).  Monitor for tremors, shaking, excessive sweating, and mental status changes.

## 2024-04-05 NOTE — TELEPHONE ENCOUNTER
Call from mom  Saw Elise 4/2/24 and saw neuro 4/3  Prescribed several medications and mom wants to make sure they are all ok to take together  Please advise    Future Appointments   Date Time Provider Department Center   5/29/2024  1:40 PM Dotty Covarrubias PA [id number]   7/5/2024 11:40 AM Yoni Hines MD [facility]       MRI BRAIN (CPT=40551) [[id number]]   EEG  Medrol dose edel  Hydration and rest  Imitrex prn (states fioricet not helping)   Zofran prn for nausea  See orders and medications filed with this encounter. The patient indicates understanding of these issues and agrees with the plan.  Discussed with patient/family regarding assessment, work up, care plan   RTC 2 months, may consider propranolol in he future for migraine prevention.  Pt should go ER for any new or worsening symptoms and contact office

## 2024-04-24 ENCOUNTER — LAB ENCOUNTER (OUTPATIENT)
Dept: LAB | Age: 19
End: 2024-04-24
Attending: NURSE PRACTITIONER
Payer: COMMERCIAL

## 2024-04-24 ENCOUNTER — OFFICE VISIT (OUTPATIENT)
Dept: FAMILY MEDICINE CLINIC | Facility: CLINIC | Age: 19
End: 2024-04-24
Payer: COMMERCIAL

## 2024-04-24 VITALS
OXYGEN SATURATION: 98 % | WEIGHT: 129 LBS | DIASTOLIC BLOOD PRESSURE: 72 MMHG | HEART RATE: 108 BPM | BODY MASS INDEX: 21 KG/M2 | SYSTOLIC BLOOD PRESSURE: 110 MMHG | TEMPERATURE: 98 F

## 2024-04-24 DIAGNOSIS — M54.9 UPPER BACK PAIN: Primary | ICD-10-CM

## 2024-04-24 DIAGNOSIS — R11.0 NAUSEA: ICD-10-CM

## 2024-04-24 DIAGNOSIS — G43.709 CHRONIC MIGRAINE WITHOUT AURA WITHOUT STATUS MIGRAINOSUS, NOT INTRACTABLE: ICD-10-CM

## 2024-04-24 LAB
CRP SERPL-MCNC: <0.29 MG/DL (ref ?–0.3)
ERYTHROCYTE [SEDIMENTATION RATE] IN BLOOD: 5 MM/HR

## 2024-04-24 PROCEDURE — 86225 DNA ANTIBODY NATIVE: CPT

## 2024-04-24 PROCEDURE — 86038 ANTINUCLEAR ANTIBODIES: CPT

## 2024-04-24 PROCEDURE — 3078F DIAST BP <80 MM HG: CPT | Performed by: NURSE PRACTITIONER

## 2024-04-24 PROCEDURE — 86140 C-REACTIVE PROTEIN: CPT

## 2024-04-24 PROCEDURE — 85652 RBC SED RATE AUTOMATED: CPT

## 2024-04-24 PROCEDURE — 86431 RHEUMATOID FACTOR QUANT: CPT

## 2024-04-24 PROCEDURE — 86200 CCP ANTIBODY: CPT

## 2024-04-24 PROCEDURE — 99214 OFFICE O/P EST MOD 30 MIN: CPT | Performed by: NURSE PRACTITIONER

## 2024-04-24 PROCEDURE — 3074F SYST BP LT 130 MM HG: CPT | Performed by: NURSE PRACTITIONER

## 2024-04-24 PROCEDURE — 36415 COLL VENOUS BLD VENIPUNCTURE: CPT

## 2024-04-24 NOTE — PROGRESS NOTES
Patient evaluated with student nurse practitioner  Independent physical examine performed    /72   Pulse 108   Temp 97.9 °F (36.6 °C)   Wt 129 lb (58.5 kg)   LMP 04/10/2024 (Approximate)   SpO2 98%   BMI 20.82 kg/m²   Physical Exam  Constitutional:       General: She is not in acute distress.     Appearance: Normal appearance.   Cardiovascular:      Rate and Rhythm: Normal rate and regular rhythm.      Heart sounds: Normal heart sounds.   Pulmonary:      Effort: Pulmonary effort is normal.      Breath sounds: Normal breath sounds.   Musculoskeletal:         General: Normal range of motion.      Cervical back: Normal range of motion. No tenderness.   Lymphadenopathy:      Cervical: No cervical adenopathy.   Skin:     General: Skin is warm and dry.   Neurological:      Mental Status: She is alert.   Psychiatric:         Attention and Perception: Attention normal.         Mood and Affect: Mood is anxious.         Speech: Speech normal.         Behavior: Behavior is cooperative.         Diagnoses and all orders for this visit:    Upper back pain  -     US ABDOMEN COMPLETE (CPT=76700); Future  -     XR THORACIC SPINE (3 VIEWS) (CPT=72072); Future  -     Rheumatoid Arthritis Factor; Future  -     Cyclic Citrullinate Peptide (CCP) antibodies; Future  -     Sed Rate, Westergren (Automated); Future  -     C-Reactive Protein; Future  -     Cancel: Carmelita, Direct, Reflex To 9 Enas ([facility]); Future  -     Physical Therapy Referral - [facility]    Chronic migraine without aura without status migrainosus, not intractable    Nausea  -     US ABDOMEN COMPLETE (CPT=76700); Future  -     Gastro Referral - In Network    Reviewed recent ER records  Additional lab work ordered  Check upper back imaging  Complete abdominal us previously recommended  Start PT

## 2024-04-24 NOTE — PROGRESS NOTES
HPI:   Patient presents for two complaints. Back pain and migraines with nausea.    She was having back pain a year ago and went to see GI where an abdominal US was ordered. She started feeling better so never got the US done. Her current episode of back pain started 5 days ago. The pain is located in her upper middle back. At its worst, she rates the pain a 9/10 and describes it as achy and throbbing. The pain lasts for 30 minutes to 1 hour and occurs about 4 times per day. Denies trauma or injury to the area. The pain radiates down her arms causing weakness. Laying down and relaxing provides mild relief.    Patient has been having migraines x1 month. She has been to the ED 2 times for migraines and nausea recently. The migraines are constant and cause her to be queasy and have no appetite. Reports head pressure and shakiness. Saw neuro Dr. Hines who ordered a MRI and EEG. She was prescribed a 5 day steroid pack, and this helped and gave her tremendous relief. Taking Zofran as needed for the nausea. Drinking electrolyte drink solution. Taking Motrin with no relief. Fioricet did not help. Not taking Imitrex. Remote history of seizure in 5th grade but none in recent years.     Current Outpatient Medications   Medication Sig Dispense Refill    ondansetron (ZOFRAN) 4 mg tablet Take 1 tablet (4 mg total) by mouth every 8 (eight) hours as needed for Nausea. 20 tablet 2    fluticasone propionate 50 MCG/ACT Nasal Suspension 2 sprays by Each Nare route daily. 9.9 mL 0    meclizine 12.5 MG Oral Tab Take 1 tablet (12.5 mg total) by mouth 2 (two) times daily as needed for Dizziness. 10 tablet 0    sertraline 50 MG Oral Tab Take 1.5 tablets (75 mg total) by mouth daily.      methylPREDNISolone (MEDROL) 4 MG Oral Tablet Therapy Pack Take as directed (Patient not taking: Reported on 4/24/2024) 1 each 0    SUMAtriptan 25 MG Oral Tab Use at onset; repeat once after 2 hours-ONLY 2 IN 24 HR MAX.  This is a 30 day supply. (Patient not  taking: Reported on 4/24/2024) 9 tablet 0      Past Medical History:    Anxiety    Depression      History reviewed. No pertinent surgical history.   Family History   Problem Relation Age of Onset    Breast Cancer Maternal Grandmother     Breast Cancer Maternal Aunt     Breast Cancer Maternal Great-Grandmother       Social History     Socioeconomic History    Marital status: Single   Tobacco Use    Smoking status: Never    Smokeless tobacco: Never    Tobacco comments:     non-smoker   Vaping Use    Vaping status: Never Used   Substance and Sexual Activity    Alcohol use: Never    Drug use: Never    Sexual activity: Never     Partners: Male   Other Topics Concern    Caffeine Concern Yes     Comment: 1-2x cups daily    Exercise No    Seat Belt Yes     Social Determinants of Health     Food Insecurity: No Food Insecurity (1/10/2024)    Received from [facility], [facility]    Food Insecurity     Currently or in the past 3 months, have you worried your food would run out before you had money to buy more?: No     In the past 12 months, have you run out of food or been unable to get more?: No   Transportation Needs: No Transportation Needs (1/10/2024)    Received from [facility], [facility]    Transportation Needs     Medical Transportation Needs?: No    Received from [facility], [facility]    Social Connections    Received from [facility], [facility]    Housing Stability         REVIEW OF SYSTEMS:   Review of Systems   Constitutional:  Positive for appetite change (no appetite) and fatigue. Negative for activity change, chills and fever.   Eyes:  Negative for visual disturbance.   Respiratory:  Negative for cough, chest tightness and shortness of breath.    Cardiovascular:  Negative for chest pain and palpitations.   Gastrointestinal:  Positive for nausea (queasy  feeling). Negative for abdominal pain, constipation, diarrhea and vomiting.   Musculoskeletal:  Positive for back pain.   Neurological:  Positive for weakness (reports arms feel weak) and headaches. Negative for dizziness and light-headedness.   Psychiatric/Behavioral:  The patient is nervous/anxious.        EXAM:   /72   Pulse 108   Temp 97.9 °F (36.6 °C)   Wt 129 lb (58.5 kg)   LMP 04/10/2024 (Approximate)   SpO2 98%   BMI 20.82 kg/m²   Physical Exam  Constitutional:       General: She is not in acute distress.     Appearance: Normal appearance. She is normal weight. She is not ill-appearing.   Cardiovascular:      Rate and Rhythm: Normal rate and regular rhythm.      Heart sounds: Normal heart sounds. No murmur heard.  Pulmonary:      Effort: Pulmonary effort is normal. No respiratory distress.      Breath sounds: Normal breath sounds. No wheezing.   Neurological:      Mental Status: She is alert and oriented to person, place, and time. Mental status is at baseline.   Psychiatric:         Mood and Affect: Mood is anxious.         Speech: Speech normal.         Behavior: Behavior normal.         Judgment: Judgment normal.       ASSESSMENT AND PLAN:   Diagnoses and all orders for this visit:    Upper back pain  -     US ABDOMEN COMPLETE (CPT=76700); Future  -     XR THORACIC SPINE (3 VIEWS) (CPT=72072); Future  -     Rheumatoid Arthritis Factor; Future  -     Cyclic Citrullinate Peptide (CCP) antibodies; Future  -     Sed Rate, Westergren (Automated); Future  -     C-Reactive Protein; Future  -     Cancel: Carmelita, Direct, Reflex To 9 Enas ([facility]); Future  -     Physical Therapy Referral - [facility]    Chronic migraine without aura without status migrainosus, not intractable    Nausea  -     US ABDOMEN COMPLETE (CPT=76700); Future  -     Gastro Referral - In Network    Terra YOUNGER RN, APN student, documented in chart.

## 2024-04-25 ENCOUNTER — TELEPHONE (OUTPATIENT)
Dept: FAMILY MEDICINE CLINIC | Facility: CLINIC | Age: 19
End: 2024-04-25

## 2024-04-25 LAB — RHEUMATOID FACT SERPL-ACNC: <10 IU/ML (ref ?–15)

## 2024-04-25 NOTE — TELEPHONE ENCOUNTER
Rheumatoid factor negative  Inflammatory markers normal   Written by SHANTHI Sandoval on 4/25/2024  7:25 AM CDT  Seen by patient Arianna Cameron Kevin on 4/25/2024 10:13 AM

## 2024-04-25 NOTE — TELEPHONE ENCOUNTER
----- Message from SHANTHI Sandoval sent at 4/25/2024  7:25 AM CDT -----  Rheumatoid factor negative  Inflammatory markers normal

## 2024-04-26 ENCOUNTER — TELEPHONE (OUTPATIENT)
Dept: FAMILY MEDICINE CLINIC | Facility: CLINIC | Age: 19
End: 2024-04-26

## 2024-04-26 LAB
CCP IGG SERPL-ACNC: 1.1 U/ML (ref 0–6.9)
DSDNA IGG SERPL IA-ACNC: 0.8 IU/ML
ENA AB SER QL IA: 0.1 UG/L
ENA AB SER QL IA: NEGATIVE

## 2024-04-26 NOTE — TELEPHONE ENCOUNTER
Pt called and states she was seen by [facility] on 04/24/24 for upper back pain. Per pt, she was still in a lot of pain and went to the ER yesterday. Pt states they did not really give her any answers on what's going on and per pt she is still in severe pain. Pt wanting to discuss with clinical staff on what she should do to help pain. Please advise

## 2024-04-26 NOTE — TELEPHONE ENCOUNTER
----- Message from SHANTHI Sandoval sent at 4/26/2024  7:50 AM CDT -----  Autoimmune workup negative

## 2024-04-26 NOTE — TELEPHONE ENCOUNTER
ARUNA  Mom calling back on patient - mom concerned about patient's pain  Patient having back pain that is increasing in severity  Mom states patient also has body aches and feels weakness in her hands  This has been brought up in the ER  X-ray has been done and routine blood work  Mom thinking possibly something autoimmune could be happening  Apt scheduled with Dr. Dela Cruz for Monday  Patient will go to ER over the weekend for any new or worsening symptoms    Future Appointments   Date Time Provider Department Center   4/28/2024  1:00 PM OS XR RM1 OS XRAY [place]   4/29/2024  2:20 PM Kelley Dela Cruz MD [facility] [place]   5/3/2024  6:00 AM PF US RM1 PF US [place]   5/13/2024  8:45 PM PF MRI RM1 (1.5T) PF MRI [place]   5/29/2024  1:40 PM Dotty Covarrubias PA [facility] [id number]   7/5/2024 11:40 AM Yoni Hines MD [facility] [place]

## 2024-04-29 ENCOUNTER — OFFICE VISIT (OUTPATIENT)
Dept: FAMILY MEDICINE CLINIC | Facility: CLINIC | Age: 19
End: 2024-04-29
Payer: COMMERCIAL

## 2024-04-29 VITALS
DIASTOLIC BLOOD PRESSURE: 80 MMHG | HEART RATE: 117 BPM | BODY MASS INDEX: 20.89 KG/M2 | HEIGHT: 66 IN | OXYGEN SATURATION: 97 % | RESPIRATION RATE: 16 BRPM | SYSTOLIC BLOOD PRESSURE: 112 MMHG | WEIGHT: 130 LBS | TEMPERATURE: 98 F

## 2024-04-29 DIAGNOSIS — M54.6 ACUTE MIDLINE THORACIC BACK PAIN: Primary | ICD-10-CM

## 2024-04-29 DIAGNOSIS — M25.50 MULTIPLE JOINT PAIN: ICD-10-CM

## 2024-04-29 DIAGNOSIS — R42 DIZZINESS: ICD-10-CM

## 2024-04-29 PROCEDURE — 3008F BODY MASS INDEX DOCD: CPT | Performed by: FAMILY MEDICINE

## 2024-04-29 PROCEDURE — 3074F SYST BP LT 130 MM HG: CPT | Performed by: FAMILY MEDICINE

## 2024-04-29 PROCEDURE — 99214 OFFICE O/P EST MOD 30 MIN: CPT | Performed by: FAMILY MEDICINE

## 2024-04-29 PROCEDURE — 3079F DIAST BP 80-89 MM HG: CPT | Performed by: FAMILY MEDICINE

## 2024-04-29 RX ORDER — SUMATRIPTAN 25 MG/1
TABLET, FILM COATED ORAL
COMMUNITY
Start: 2024-04-27

## 2024-04-29 RX ORDER — CYCLOBENZAPRINE HCL 10 MG
10 TABLET ORAL
COMMUNITY
Start: 2024-04-27

## 2024-04-29 NOTE — PROGRESS NOTES
Chief Complaint   Patient presents with    Migraine    Back Pain     Back pain X 1 year   Body aches  X 2 weeks         HPI:    Patient ID: Arianna Brown is a 18 year old female.    Migraine   Associated symptoms include back pain.   Back Pain     For migraine she is seeing neurology.   Back pain- reviewed notes from ER and UC and reviewed ultrasound and xray. Blood work reviewed.  Back pain is at the upper back to mid back and goes to both side. She feels some hand pain achy. No tingling numbness.  Right now is 3 out of 10. She has ibuprofen and tylenol and both help little. She feels little better after er.       Review of Systems   Musculoskeletal:  Positive for back pain.     Pos back pain      Current Outpatient Medications   Medication Sig Dispense Refill    ondansetron (ZOFRAN) 4 mg tablet Take 1 tablet (4 mg total) by mouth every 8 (eight) hours as needed for Nausea. 20 tablet 2    fluticasone propionate 50 MCG/ACT Nasal Suspension 2 sprays by Each Nare route daily. 9.9 mL 0    meclizine 12.5 MG Oral Tab Take 1 tablet (12.5 mg total) by mouth 2 (two) times daily as needed for Dizziness. 10 tablet 0    sertraline 50 MG Oral Tab Take 1.5 tablets (75 mg total) by mouth daily.      cyclobenzaprine 10 MG Oral Tab Take 1 tablet (10 mg total) by mouth. (Patient not taking: Reported on 4/29/2024)      SUMAtriptan 25 MG Oral Tab TAKE 1 TABLET BY MOUTH AT ONSET OF MIGRAINE. MAY REPEAT ONCE AFTER 2 HOURS MAX 2 IN 24 HOURS. THIS IS 30 DAY SUPPLY (Patient not taking: Reported on 4/29/2024)       Allergies:No Known Allergies    HISTORY:  Past Medical History:    Anxiety    Depression      No past surgical history on file.   Family History   Problem Relation Age of Onset    Breast Cancer Maternal Grandmother     Breast Cancer Maternal Aunt     Breast Cancer Maternal Great-Grandmother       Social History:   Social History     Socioeconomic History    Marital status: Single   Tobacco Use    Smoking status: Never     Smokeless tobacco: Never    Tobacco comments:     non-smoker   Vaping Use    Vaping status: Never Used   Substance and Sexual Activity    Alcohol use: Never    Drug use: Never    Sexual activity: Never     Partners: Male   Other Topics Concern    Caffeine Concern Yes     Comment: 1-2x cups daily    Exercise No    Seat Belt Yes     Social Determinants of Health     Food Insecurity: No Food Insecurity (4/25/2024)    Received from [facility]    Food Insecurity     Currently or in the past 3 months, have you worried your food would run out before you had money to buy more?: No     In the past 12 months, have you run out of food or been unable to get more?: No   Transportation Needs: No Transportation Needs (4/25/2024)    Received from [facility]    Transportation Needs     Medical Transportation Needs?: No    Received from [facility], [facility]    Social Connections    Received from [facility], [facility]    Housing Stability        PHYSICAL EXAM:    /80   Pulse 117   Temp 97.6 °F (36.4 °C)   Resp 16   Ht 5' 6\" (1.676 m)   Wt 130 lb (59 kg)   LMP 04/10/2024 (Approximate)   SpO2 97%   BMI 20.98 kg/m²    Physical Exam  Constitutional:       General: She is not in acute distress.     Appearance: She is not ill-appearing.   Musculoskeletal:      Comments: Exam of her back show mid thoracic paraspinal tenderness.  Power of both hand normal no sensory deficit.    Neurological:      Mental Status: She is alert.              ASSESSMENT/PLAN:   Back pain- most likely muscle related reviewed labs and xray us result. Recommend to start flexeril and PT. Order given. Talk with mother wants lyme test order place  If sym no better in 1 wk tehn consider ct chest. She vu.              No follow-ups on file.

## 2024-05-01 ENCOUNTER — ORDER TRANSCRIPTION (OUTPATIENT)
Dept: PHYSICAL THERAPY | Facility: HOSPITAL | Age: 19
End: 2024-05-01

## 2024-05-01 ENCOUNTER — TELEPHONE (OUTPATIENT)
Dept: FAMILY MEDICINE CLINIC | Facility: CLINIC | Age: 19
End: 2024-05-01

## 2024-05-01 DIAGNOSIS — M54.6 ACUTE MIDLINE THORACIC BACK PAIN: Primary | ICD-10-CM

## 2024-05-01 DIAGNOSIS — S83.521D RUPTURE OF POSTERIOR CRUCIATE LIGAMENT OF RIGHT KNEE, SUBSEQUENT ENCOUNTER: Primary | ICD-10-CM

## 2024-05-01 RX ORDER — SCOLOPAMINE TRANSDERMAL SYSTEM 1 MG/1
1 PATCH, EXTENDED RELEASE TRANSDERMAL ONCE
Status: CANCELLED | OUTPATIENT
Start: 2024-05-01 | End: 2024-05-01

## 2024-05-01 NOTE — TELEPHONE ENCOUNTER
Call back from patient  States she had labs at [facility] on 4/2 and 4/25 and some were abnormal. Should anything be repeated? Could Dr Dela Cruz review  Aware needs to do lyme testing Dr Dela Cruz ordered  Also is scheduled for US abd and MRI brain-advised these tests will give better idea about her symptoms  Aware Dr Dela Cruz out of the office today

## 2024-05-01 NOTE — TELEPHONE ENCOUNTER
LMTCB    Scheduled for US, also has xray ordered    Future Appointments   Date Time Provider Department Center   5/3/2024  6:00 AM PF US RM1 PF US [place]   5/13/2024  8:45 PM PF MRI RM1 (1.5T) PF MRI [place]   5/22/2024  1:30 PM Lev, Gia, PT [facility] Phys [facility] [place]   5/24/2024  2:15 PM Berberich, Gia, PT [facility] Phys T [place]   5/29/2024  1:40 PM Dotty Covarrubias PA [facility] [id number]   5/29/2024  3:45 PM Berberich, Gia, PT [facility] Phys T [place]   5/31/2024  2:15 PM Berberich, Gia, PT [facility] Phys [facility] [place]   6/4/2024  2:45 PM Berberich, Gia, PT [facility] Phys [facility] [place]   6/6/2024  2:45 PM Berberich, Gia, PT [facility] Phys [facility] [place]   6/12/2024  2:15 PM Berberich, Gia, PT [facility] Phys T [place]   6/14/2024  2:15 PM Berberich, Gia, PT [facility] Phys T [place]   6/19/2024  2:15 PM Berberich, Gia, PT [facility] Phys T [place]   6/21/2024  2:15 PM Berberich, Gia, PT [facility] Phys T [place]   6/26/2024  2:15 PM Berberich, Gia, PT [facility] Phys T [place]   6/28/2024  2:15 PM Berberich, Gia, PT [facility] Phys T [place]   7/5/2024 11:40 AM Yoni Hines MD [facility] [place]

## 2024-05-01 NOTE — TELEPHONE ENCOUNTER
Patient called and states she saw some of her test results on MyChart from her apt on 04/29/24 and was wondering if someone can call her to discuss. Please advise

## 2024-05-02 ENCOUNTER — LABORATORY ENCOUNTER (OUTPATIENT)
Dept: LAB | Age: 19
End: 2024-05-02
Attending: FAMILY MEDICINE
Payer: COMMERCIAL

## 2024-05-02 DIAGNOSIS — R42 DIZZINESS: ICD-10-CM

## 2024-05-02 DIAGNOSIS — M25.50 MULTIPLE JOINT PAIN: ICD-10-CM

## 2024-05-02 DIAGNOSIS — M54.6 ACUTE MIDLINE THORACIC BACK PAIN: ICD-10-CM

## 2024-05-02 LAB
BILIRUB UR QL STRIP.AUTO: NEGATIVE
CLARITY UR REFRACT.AUTO: CLEAR
COLOR UR AUTO: COLORLESS
GLUCOSE UR STRIP.AUTO-MCNC: NORMAL MG/DL
KETONES UR STRIP.AUTO-MCNC: NEGATIVE MG/DL
LEUKOCYTE ESTERASE UR QL STRIP.AUTO: NEGATIVE
NITRITE UR QL STRIP.AUTO: NEGATIVE
PH UR STRIP.AUTO: 7 [PH] (ref 5–8)
PROT UR STRIP.AUTO-MCNC: NEGATIVE MG/DL
RBC UR QL AUTO: NEGATIVE
SP GR UR STRIP.AUTO: <1.005 (ref 1–1.03)
UROBILINOGEN UR STRIP.AUTO-MCNC: NORMAL MG/DL

## 2024-05-02 PROCEDURE — 87086 URINE CULTURE/COLONY COUNT: CPT

## 2024-05-02 PROCEDURE — 81003 URINALYSIS AUTO W/O SCOPE: CPT

## 2024-05-02 NOTE — TELEPHONE ENCOUNTER
Reviewed labs cbc cmp tsh from 4/2 and 4/25 no need to repeat them.  Her UA shows LE we can repeat that to make sure she does not have uti. Order placed.

## 2024-05-03 ENCOUNTER — TELEPHONE (OUTPATIENT)
Dept: FAMILY MEDICINE CLINIC | Facility: CLINIC | Age: 19
End: 2024-05-03

## 2024-05-03 ENCOUNTER — HOSPITAL ENCOUNTER (OUTPATIENT)
Dept: ULTRASOUND IMAGING | Age: 19
Discharge: HOME | End: 2024-05-03
Attending: NURSE PRACTITIONER
Payer: COMMERCIAL

## 2024-05-03 DIAGNOSIS — M54.9 UPPER BACK PAIN: ICD-10-CM

## 2024-05-03 DIAGNOSIS — R11.0 NAUSEA: ICD-10-CM

## 2024-05-03 PROCEDURE — 76700 US EXAM ABDOM COMPLETE: CPT | Performed by: NURSE PRACTITIONER

## 2024-05-03 NOTE — TELEPHONE ENCOUNTER
----- Message from SHANTHI Sandoval sent at 5/3/2024  7:22 AM CDT -----  Results reviewed. Normal abdominal us

## 2024-05-03 NOTE — TELEPHONE ENCOUNTER
ARUNA  Patient has started the muscle relaxer  Patient did not get much relief  Has not tried physical therapy yet  Patient will cancel apt today  She will call dr. Dela Cruz with an update on Tuesday to see if she should proceed with CT scan  In mean time patient will try alternating tylenol and advil during the day and using flexeril at night  She will also try heat and ice therapy

## 2024-05-03 NOTE — TELEPHONE ENCOUNTER
Results reviewed. Normal abdominal us   Written by SHANTHI Sandoval on 5/3/2024  7:22 AM CDT  Seen by patient Arianna Brown on 5/3/2024  7:22 AM

## 2024-05-10 ENCOUNTER — TELEPHONE (OUTPATIENT)
Dept: FAMILY MEDICINE CLINIC | Facility: CLINIC | Age: 19
End: 2024-05-10

## 2024-05-10 NOTE — TELEPHONE ENCOUNTER
Patient called and states she has a couple of questions regarding her test results and is wondering if a clinical staff member can discuss with her. Please advise

## 2024-05-10 NOTE — TELEPHONE ENCOUNTER
Patient called to check status of lyme testing  Notified patient lab did not run test.  Patient will come to our lab next week to have drawn

## 2024-05-13 ENCOUNTER — HOSPITAL ENCOUNTER (OUTPATIENT)
Dept: MRI IMAGING | Age: 19
Discharge: HOME | End: 2024-05-13
Attending: Other

## 2024-05-13 DIAGNOSIS — G43.709 CHRONIC MIGRAINE W/O AURA W/O STATUS MIGRAINOSUS, NOT INTRACTABLE: ICD-10-CM

## 2024-05-13 PROCEDURE — 70551 MRI BRAIN STEM W/O DYE: CPT | Performed by: OTHER

## 2024-05-15 RX ORDER — TRAMADOL HYDROCHLORIDE 50 MG/1
TABLET ORAL
Qty: 20 TABLET | Refills: 1 | Status: SHIPPED | OUTPATIENT
Start: 2024-05-15 | End: 2024-06-14

## 2024-05-15 RX ORDER — ONDANSETRON 4 MG/1
TABLET, FILM COATED ORAL
Qty: 8 TABLET | Refills: 0 | Status: SHIPPED | OUTPATIENT
Start: 2024-05-15

## 2024-05-15 RX ORDER — MELOXICAM 15 MG/1
15 TABLET ORAL DAILY
Qty: 14 TABLET | Refills: 1 | Status: SHIPPED | OUTPATIENT
Start: 2024-05-15 | End: 2024-06-14

## 2024-05-15 NOTE — DISCHARGE INSTRUCTIONS
PCL Reconstruction  Post-Operative Guidelines    This document will help you plan for your post-operative recovery course following surgery. Please read and retain this information for future reference. Many of the questions you may have later can be answered by referring to this information.    DIET   Begin with clear liquids and light foods (jellos, soups, etc.).   Progress to your normal diet if you are not nauseated.       POST OPERATIVE BRACE   A hinged knee brace is to be worn for 3-4 weeks after surgery.   Please sleep with this brace on. See additional instructions for brace details.   Okay to remove ACE wrap for ice therapy, but compression is helpful during the first 2 weeks.        CRUTCHES   Week 1: Toe-touch weight-bearing with 2 crutches   Approximately 20 lbs - lightly resting the foot on the floor.     Week 2: Full weight (wearing the brace in extension) as tolerated.  Wean to 1 crutch during the 2nd week, then discontinue crutches or transition to a cane.  You may discontinue the crutches during the second week when you are comfortable with full weight on the leg. Your physical therapist may guide you with this process.     IF MENISCUS REPAIRED :     Week 1-4: Toe-touch weight-bearing with 2 crutches   Approximately 20 lbs - lightly resting the foot on the floor.     You may discontinue the crutches during the second week when you are comfortable with full weight on the leg. Your physical therapist may guide you with this process.       WOUND CARE   Please keep the ACE wrap on under the brace for the first week, you may remove the ACE wrap for ice therapy.  Underneath the ACE wrap are waterproof bandages, please keep these in place until your first post-op appointment with Dr. Contreras.  Under the waterproof bandages is Dermabond, this is a surgical glue and tape that is used in conjunction with absorbable stitches to close the incision.   Please do not touch the Dermabond or place any ointments  lotions or creams directly over the incisions.  You may shower after removing the ACE wrap by placing Saran wrap around the leg and covering the bandages.  NO soaking of the operative leg (ie: bath or pool) is allowed until 6 weeks after surgery.       PAIN MANAGEMENT  Local numbing medications, or peripheral nerve block are injected into the wound around the knee at the time of surgery. These will wear off within 8-24 hours and it is not uncommon for you to encounter more pain on the first or second day after surgery when swelling peaks.   Do not drive a car or operate machinery while taking the narcotic medication.   Please avoid alcohol use while taking the narcotic pain medication.     NON-NARCOTIC PAIN MEDICATIONS   Extra-Strength Tylenol (Acetaminophen) 500mg Tablets (available over the counter)  Indication: pain, non-narcotic pain reliever  Use: Take 1-2 tablets every 6 hours.  Do not exceed 8 tablets in a 24-hour period.    Mobic (Meloxicam) 15mg Tablets (Prescription sent to pharmacy)  Indication: pain and anti-inflammatory, non-narcotic pain reliever  Use: Take 1 tablets daily with meals for the first 14 days after surgery.  Side Effects: upset stomach, acid reflux.  If this occurs, stop the medication.      NARCOTIC PAIN MEDICATIONS  OPIOIDS/NARCOTICS are prescription pain medications.  One of the following medications will be prescribed and should only be used if adequate pain control is not achieved with combination of ice, Aleve, and extra-strength Tylenol outlined above.   Side effects include constipation, nausea, drowsiness, dry mouth, itchiness.  Use a 0-10 pain scale to decide how much medication to take:                                   Pain 0-4/10: No narcotics necessary                                  Pain 5-7/10: Take one tablet                                    Pain 8-10/10: Take two tablets   Oxycodone (Roxicodone) 5mg tablet  Indication: pain 5/10 or greater.  Narcotic pain  medication.  Use: 1-2 tabs every 4-6 hours as needed for pain.  Do not exceed more than 10 tabs in any 24-hour time period unless otherwise directed.  Tramadol (Ultram) 50mg tablet  Indication: pain 5/10 or greater.  Non-opioid narcotic like medication  Use: 1-2 tabs every 6 hours as needed for pain.  Do not take more than 8 tablets in any 24-hour time period.      medications to manage side effects  Narcotics may cause nausea and constipation. Bowel regimen is recommended.  Colace (Docusate) 100 mg - available OTC  Indication:  constipation, stool softener.  Take consistently while on narcotics.  Use:  Take 1 pill three times per day while you are taking narcotics.    Senokot (Senna) 8.6 mg - available OTC  Indication: constipation, stool laxative.  Take consistently while on narcotics.   Use: Take 2 pills at bedtime.  Increase to 2 pills twice daily if no bowel movement by post-surgery day 2.    Miralax (Polyethylene Glycol) 17.6 g - available OTC  Indication: constipation, stool laxative.  Take if above medications are not working.  Use: Take one dose at bedtime.  In addition to above with no bowel movement by post-surgery day 2.   Zofran (Ondansetron ODT) 4 mg- Prescription sent to pharmacy  Indication: nausea.  Take as needed.  Use: Take 1 pill AS NEEDED every 8 hours, do not exceed 3 pills in 24 hours      ACTIVITY   Elevate the operative leg to chest level whenever possible to decrease swelling.   Do not place pillows under knees (i.e. do not maintain knee in a flexed or bent position), but rather place pillows under the foot/ankle.   Use crutches to assist with walking - Do not walk without brace on.   Do not engage in activities which increase knee pain/swelling (prolonged periods of standing or walking) for the first 4 weeks following surgery.   Avoid long periods of sitting (without leg elevated) or long distance traveling for 4 weeks.   NO driving until instructed otherwise by physician.   May return to  sedentary work ONLY or school 3-4 days after surgery, if pain is tolerable.       ICE THERAPY   Icing is very important in the initial post-operative period and should begin immediately after surgery.   You can remove the ACE wrap for ice therapy  Use icing machine continuously or ice packs (if machine not prescribed) for 30-45 minutes every 2 hours daily until your first post-operative visit - remember to keep leg elevated to level of chest while icing. Care should be taken with icing to avoid frostbite to the skin.   You do not need to wake up in the middle of the night to change over the ice machine or icepacks unless you are uncomfortable.         EXERCISE   Begin exercises 24 hours after surgery (straight leg raises, quad sets, heel slides, and ankle pumps, unless otherwise instructed.   Discomfort and knee stiffness are normal for a few days following surgery. It is safe to bend your knee in a non-weightbearing position when performing exercises unless otherwise instructed. Avoid flexing past 90 degrees.   Complete exercises 3-4 times daily until your first post-operative visit - your motion goals are to have complete extension (straightening) and 90 degrees of flexion (bending) at your first post- operative appointment unless otherwise instructed.   Perform ankle pumps continuously throughout the day to reduce the risk of developing a blood clot in your calf.   Formal physical therapy (PT) typically begins as soon as possible, ideally 1-3 days after surgery.       EMERGENCIES**   Contact Dr. Contreras’s Team via [employer] or [phone] if any of the following are present:     Painful swelling or numbness (note that some swelling and numbness is normal).   Unrelenting pain.  Fever (over 101° - it is normal to have a low-grade fever for the first day or two following surgery)   Redness around incisions.   Color change in foot or ankle.   Continuous drainage or bleeding from incision (a small amount of drainage is  expected).   Difficulty breathing.   Excessive nausea/vomiting   Calf pain.   If you have an emergency after office hours or on the weekend, contact the office at [phone] and you will be connected to our pager service. This will connect you with the Physician on call.   If you have an emergency that requires immediate attention proceed to the nearest emergency room.       FOLLOW-UP CARE/QUESTIONS   Your first post-operative appointment will be 7-14 days following surgery for wound evaluation, knee X-rays and to answer any questions you have regarding the procedure.   Typically, the first physical therapy appointment following ACL reconstruction is made for 1-3 days following surgery. This prescription will be communicated prior to surgery.  If you have any further questions please contact Dr. Contreras’s team directly.        Frequently Asked Questions: ACL Reconstruction Surgery    What is the ACL?  The Anterior Cruciate Ligament (ACL) is a ligament within the knee joint that connects the femur (thigh bone) to the tibia (shin bone). The ACL is important for stabilizing the knee joint to allow for pivoting and twisting motions that are common in sports.     How does an ACL Injury occur?  ACL injuries happen most commonly in high-risk sports such as football, soccer, basketball, gymnastics and skiing in which the athletes perform sudden stops, aggressive changes in direction, or jumping and landing. They can also be caused by a collision with other athletes (such as a tackle) in which the knee is forcibly bent in an unnatural way.     Why does the ACL get injured?  There are more than 200,000 ACL injuries in the [place] every year, making it the most common knee injury among athletes of all ages and competition levels. At the time of injury, the knee is usually slightly bent leading to excessive force on the ACL which may cause an injury ranging from a minor strain to a complete tear. Women are at two to four  times higher risk that men for ACL injury, this is due to an anatomic difference in hip-knee angle, muscle strength, and hormonal influences. Other predisposing factors include poor conditioning, improper equipment (shoes, ski bindings/boots), or adverse weather conditions. Artificial turf results greater friction and is responsible for more knee injuries that on grass surfaces.     What are the symptoms of an ACL injury?  At the time of injury, you may hear a “pop” followed by immediate pain and swelling of the knee. As the swelling resolves, there may be bruising and a sense of the knee buckling or feeling unstable. You may not be able to bend or straighten the knee as much as the other (uninjured) side. While some may not be able to walk or stand for prolonged times, sometimes the pain and swelling completely resolves. Although, it is important to know that continuing activities with an unstable knee can cause further damage to structures in the knee such as the meniscus and cartilage.     How is an ACL injury diagnosed?  Sports Medicine specialists such as Dr. Contreras can diagnose an ACL injury by listening to the patient’s story of how the injury occurred and performing stability tests during physical examination. As part of the comprehensive evaluation, x-rays of the knee and MRI (magnetic resonance imaging) scan are also done to check for any additional injuries that may occur up to 50% of the time.     What are the different types of ACL injury?  Grade 1 - ACL Sprain   This is where the ACL is stretched, resulting is a minor injury to its fibers. Initially, the patient may have similar symptoms as other types of ACL injury including swelling, pain, and inability to walk normally. Fortunately, Grade 1 ACL sprains can be treated with non-operative treatment including, rest, ice, elevation, compression, and anti-inflammatory medications. Patients are typically able to return to activity within 2-4 weeks after  injury.     Grade 2 - Partial ACL Tear  In this injury, the ACL fibers are partially torn. Depending on how much of the ACL is injured, the knee may or may not be unstable. Some patients may do well with non-operative treatment and if there is minor knee instability this can be compensated by knee bracing and physical therapy to strengthen surrounding knee muscles. Surgery may be needed if the knee is too unstable to perform the patient’s desired activities, such as return to sport.     Grade 3 - Complete ACL Tear  Complete tearing of the ACL fibers causes the knee to become unstable. Surgery is needed in this setting for returning to an active lifestyle and to help prevent further injuries within the knee due to instability.       When is the best time for ACL surgery?  The best time for surgery typically occurs 1-2 weeks after the initial injury, this allows for the swelling and pain of the knee to be reduced. Patients that undergo surgery with full knee range of motion (ability to bend and straighten) have an easier time regaining this ability after surgery. It is often helpful to attend 1-2 physical therapy sessions prior to surgery to strengthen muscles around the knee and speed up the recovery process after surgery.     What is the treatment for an ACL injury?  With a high-grade ACL injury, the fibers are damaged beyond repair; this is similar to that of a rope being pulled apart. As a result, the ACL needs to replaced (termed reconstruction) with a material that will take the role of the ACL. This procedure is performed using knee arthroscopy, a minimally invasive technique that allows for a clear view of the entire knee joint to treat other problems such as a torn meniscus at the same time.     ACL reconstruction can be done with the patient’s own tissue (autograft) or donated cadaveric tissue (allograft). Studies have universally shown that autograft is superior to allograft for quality of healing and  reliability in ACL reconstruction. Preferred autografts that we use for ACL reconstruction include:  Patellar Tendon - The central 1/3 of the patellar tendon with 2 blocks of bone on each end is used to replace an injured ACL. The remaining ends of the patellar tendon are stitched together and it heals uneventfully. This graft is most commonly used in contact athletes aiming to return to high level sports.     Quadriceps Tendon - The central 1/3 of the quadriceps tendon with or without a single bone block from the patella (kneecap) is used to replace the ACL. The remaining ends of the quadriceps tendon are stitched together allowing for complete healing. This graft is gaining popularity, it is often used for revision (repeat) surgery as well as primary surgery if deemed to be the best option.     Hamstring Tendons - Two of the four hamstring tendons (Semitendinosis and Gracilis) are used to reconstruct the ACL. Over time, the remaining hamstring muscles grow to balance out the minor loss of hamstring strength. This graft is best for slightly lower demand athletes that will not be performing cutting / pivoting activities regularly. This graft often has the least challenging recovery and rehabilitation of the 3 options.       How long before I can play sports after an ACL reconstruction?  Recovery time is an individualized process for each patient and depends on multiple factors including additional procedures at the time of ACL reconstruction and graft selection. Successful return to sports requires fully regaining muscle strength, balance, and stamina to a level equal to or preferably better than prior to the injury. Dr. Contreras carefully prescribes a protocol for physical therapy, strength training, and a focused return to sport regimen that can be accomplished in 8-12 months after reconstruction. Along each step of the way, our team evaluates strength and progression to guide the recovery.       What are the risks  of ACL surgery?   There are two primary risks associated with ACL surgery. The first risk is infection. While very uncommon, infections do occur and are typically associated with poor wound healing. As such, we recommend keeping these wounds dry for at least 2 weeks after surgery. Please do not use ointments or other compounds on these wounds until instructed to do so by the staff.  Again, smoking interferes with wound healing, so discontinuing smoking 2 weeks prior and following surgery is recommended.   Blood clots (DVT, deep vein thrombosis) occur rarely following all types of surgery. Your best bet in decreasing likelihood of a clot is to GET UP and MOVING following surgery. Moving your feet and ankles, ambulating, ranging your knee, doing leg lifts etc. all contribute to keeping the blood moving in your legs circulating. This in turn helps to prevent clotting. If you feel pain in your calf area, or note swelling there - immediately notify the office staff. A quick and painless test (ultrasound) can be arranged to see if you have a DVT. Again, these issues are rare, but if you do experience a clot, you will need to take a blood thinner (Warfarin, Coumadin) until the clot disappears.      How often do I come back to the office?  You will need to come back to the office at 1 week, 6-8 weeks, 4 months, 6-7 months, and 1 year after surgery.

## 2024-05-15 NOTE — TELEPHONE ENCOUNTER
RESCHEDULED PER PATIENT    Date of Surgery: 6/25/2024       Post Op Appt: 7/1/2024 11AM     Case ID: [id number]

## 2024-05-17 ENCOUNTER — TELEPHONE (OUTPATIENT)
Dept: PHYSICAL THERAPY | Facility: HOSPITAL | Age: 19
End: 2024-05-17

## 2024-05-22 ENCOUNTER — APPOINTMENT (OUTPATIENT)
Dept: PHYSICAL THERAPY | Age: 19
End: 2024-05-22
Attending: ORTHOPAEDIC SURGERY
Payer: COMMERCIAL

## 2024-05-24 ENCOUNTER — APPOINTMENT (OUTPATIENT)
Dept: PHYSICAL THERAPY | Age: 19
End: 2024-05-24
Attending: ORTHOPAEDIC SURGERY
Payer: COMMERCIAL

## 2024-05-29 ENCOUNTER — TELEPHONE (OUTPATIENT)
Dept: FAMILY MEDICINE CLINIC | Facility: CLINIC | Age: 19
End: 2024-05-29

## 2024-05-29 ENCOUNTER — APPOINTMENT (OUTPATIENT)
Dept: PHYSICAL THERAPY | Age: 19
End: 2024-05-29
Attending: ORTHOPAEDIC SURGERY
Payer: COMMERCIAL

## 2024-05-29 NOTE — TELEPHONE ENCOUNTER
Overdue imaging  MCM sent  Future Appointments   Date Time Provider Department Center   5/30/2024  1:30 PM [id number]   6/11/2024  9:30 AM Theodore Mac MD [facility]   6/26/2024  2:15 PM Gia Ohara, PT [facility] [place]   6/28/2024  2:15 PM Gia Ohara, PT [facility] [place]   7/1/2024 11:00 AM Dotty Covarrubias PA [id number]   7/2/2024  2:00 PM Gia Ohara, PT [facility] [place]   7/5/2024 11:40 AM Yoni Hines MD [facility] [place]   7/5/2024  2:15 PM Gia Ohara, PT [facility] [place]   7/9/2024  2:00 PM Gia Ohara, PT [facility] [place]   7/11/2024  2:00 PM Gia Ohara, PT [facility] [place]   7/16/2024  2:00 PM Gia Ohara, PT [facility] [place]   7/18/2024  2:00 PM Gia Ohara, PT [facility] [place]   7/23/2024  2:00 PM Gia Ohara, PT [facility] [place]   7/25/2024  2:00 PM Gia Ohara, PT [facility] [place]   7/30/2024  2:00 PM Gia Ohara, PT [facility] [place]   8/1/2024  2:00 PM Gia Ohara, PT [facility] [place]

## 2024-05-30 ENCOUNTER — NURSE ONLY (OUTPATIENT)
Dept: ELECTROPHYSIOLOGY | Facility: HOSPITAL | Age: 19
End: 2024-05-30
Attending: Other

## 2024-05-30 DIAGNOSIS — G43.709 CHRONIC MIGRAINE W/O AURA W/O STATUS MIGRAINOSUS, NOT INTRACTABLE: ICD-10-CM

## 2024-05-30 PROCEDURE — 95812 EEG 41-60 MINUTES: CPT | Performed by: OTHER

## 2024-05-30 NOTE — PROCEDURES
Date of Procedure: 5/30/2024    Procedure: EEG (ELECTROENCEPHALOGRAM) 41-60 mins    DIAGNOSIS: CHRONIC MIGRAINE WI/O AURA W/O STATUS MIGRAINOSUS  HISTORY: 18 YEAR OLD FEMALE WITH COMPLAINTS OF HEADACHE / HEAD PRESSURE FOR 3 WEEKS.  HEAD PAIN SEVERITY CHANGES BUT PAIN CONTINUOUS.  FEELS A PRESSURE FROM BACK OF HER HEAD TO THE FRONT.  NOTHING HELPS TO RESOLVE PAIN.  PATIENT HAS HISOTRY OF SEIZURE BUT NO RECENT SEIZURES.  PAST MEDICAL HISTORY: ANXEITY, DEPRESSION, ANXIETY  PATIENT STATE: ALERT AND ORIENTED TIMES 3.    AWAKE AND DROWSY   SLEEP DEPRIVED: PER PATIENT YES. PATIENT SLEPT 6 HOURS, NORMALLY SLEEPS 11    BACKGROUND ACTIVITY: Posterior rhythm was in the range of 7-9 Hz, reactive to eye opening; symmetrical and synchronous. Drowsiness is characterized by intermittent theta waves bitemporally. Occasional sharp transients noted in posterior region.   EPILEPTIFORM DISCHARGES: There were no epileptiform discharges or periodic lateralized epileptiform discharges (PLEDs) recorded throughout the recording.   HYPERVENTILATION: Hyperventilation was performed with no change.  PHOTIC STIMULATION: Photic stimulation was performed with no change.   Stage II sleep was not reached.    IMPRESSION: There were no electroclinical seizure or epileptiform discharges captured. This does not rule out seizure disorder. Clinical correlation is advised.    Yoni Hines MD ([place])   Neurology  [facility]  5/30/2024, 3:35 PM  CC: Kelley Dela Cruz MD

## 2024-05-31 ENCOUNTER — APPOINTMENT (OUTPATIENT)
Dept: PHYSICAL THERAPY | Age: 19
End: 2024-05-31
Attending: ORTHOPAEDIC SURGERY
Payer: COMMERCIAL

## 2024-06-04 ENCOUNTER — APPOINTMENT (OUTPATIENT)
Dept: PHYSICAL THERAPY | Age: 19
End: 2024-06-04
Attending: ORTHOPAEDIC SURGERY
Payer: COMMERCIAL

## 2024-06-06 ENCOUNTER — APPOINTMENT (OUTPATIENT)
Dept: PHYSICAL THERAPY | Age: 19
End: 2024-06-06
Attending: ORTHOPAEDIC SURGERY
Payer: COMMERCIAL

## 2024-06-11 PROBLEM — R68.81 EARLY SATIETY: Status: ACTIVE | Noted: 2024-06-11

## 2024-06-11 PROBLEM — R11.0 NAUSEA: Status: ACTIVE | Noted: 2024-06-11

## 2024-06-11 PROBLEM — R14.1 ABDOMINAL GAS PAIN: Status: ACTIVE | Noted: 2024-06-11

## 2024-06-11 PROCEDURE — 88305 TISSUE EXAM BY PATHOLOGIST: CPT | Performed by: INTERNAL MEDICINE

## 2024-06-12 ENCOUNTER — APPOINTMENT (OUTPATIENT)
Dept: PHYSICAL THERAPY | Age: 19
End: 2024-06-12
Attending: ORTHOPAEDIC SURGERY
Payer: COMMERCIAL

## 2024-06-14 ENCOUNTER — APPOINTMENT (OUTPATIENT)
Dept: PHYSICAL THERAPY | Age: 19
End: 2024-06-14
Attending: ORTHOPAEDIC SURGERY
Payer: COMMERCIAL

## 2024-06-17 ENCOUNTER — TELEPHONE (OUTPATIENT)
Dept: PHYSICAL THERAPY | Facility: HOSPITAL | Age: 19
End: 2024-06-17

## 2024-06-19 ENCOUNTER — APPOINTMENT (OUTPATIENT)
Dept: PHYSICAL THERAPY | Age: 19
End: 2024-06-19
Attending: ORTHOPAEDIC SURGERY
Payer: COMMERCIAL

## 2024-06-20 ENCOUNTER — TELEPHONE (OUTPATIENT)
Dept: FAMILY MEDICINE CLINIC | Facility: CLINIC | Age: 19
End: 2024-06-20

## 2024-06-20 NOTE — TELEPHONE ENCOUNTER
Can try tylenol or ibuprofen if needed.   I would recommend we have her see specialist since her symptoms are not better. F further workup needed they can order it. If she wlling I can place referral.

## 2024-06-20 NOTE — TELEPHONE ENCOUNTER
Patient called said that she was last seen for back pain. Patient was given muscle relaxer, per patient it isn't helping.     Also patient said she hasn't started physical therapy yet. She has Appointment scheduled for next Wednesday.     Would like to know what else she can do to help with the pain. 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 Some times it does get better but then get really bad.     Patient said that DR. Dela Cruz told her next steps would be a CT scan. Patient would like to know if theres any other imaging that don't require radiation

## 2024-06-21 ENCOUNTER — APPOINTMENT (OUTPATIENT)
Dept: PHYSICAL THERAPY | Age: 19
End: 2024-06-21
Attending: ORTHOPAEDIC SURGERY
Payer: COMMERCIAL

## 2024-06-24 ENCOUNTER — TELEPHONE (OUTPATIENT)
Dept: PHYSICAL THERAPY | Facility: HOSPITAL | Age: 19
End: 2024-06-24

## 2024-06-24 RX ORDER — ONDANSETRON 4 MG/1
TABLET, FILM COATED ORAL
Qty: 8 TABLET | Refills: 0 | Status: SHIPPED | OUTPATIENT
Start: 2024-06-24

## 2024-06-24 RX ORDER — TRAMADOL HYDROCHLORIDE 50 MG/1
TABLET ORAL
Qty: 20 TABLET | Refills: 1 | Status: SHIPPED | OUTPATIENT
Start: 2024-06-24

## 2024-06-24 RX ORDER — MELOXICAM 15 MG/1
15 TABLET ORAL DAILY
Qty: 14 TABLET | Refills: 1 | Status: SHIPPED | OUTPATIENT
Start: 2024-06-24

## 2024-06-24 NOTE — TELEPHONE ENCOUNTER
Rupa message sent to patient with Dr. Dela Cruz's instructions.  Patient is scheduled for knee surgery tomorrow.  Instructed should not take Ibuprofen

## 2024-06-25 ENCOUNTER — HOSPITAL ENCOUNTER (OUTPATIENT)
Facility: HOSPITAL | Age: 19
Setting detail: HOSPITAL OUTPATIENT SURGERY
Discharge: HOME | End: 2024-06-25
Attending: ORTHOPAEDIC SURGERY | Admitting: ORTHOPAEDIC SURGERY

## 2024-06-25 ENCOUNTER — ANESTHESIA (OUTPATIENT)
Dept: SURGERY | Facility: HOSPITAL | Age: 19
End: 2024-06-25

## 2024-06-25 ENCOUNTER — ANESTHESIA EVENT (OUTPATIENT)
Dept: SURGERY | Facility: HOSPITAL | Age: 19
End: 2024-06-25

## 2024-06-25 ENCOUNTER — APPOINTMENT (OUTPATIENT)
Dept: GENERAL RADIOLOGY | Facility: HOSPITAL | Age: 19
End: 2024-06-25
Attending: ORTHOPAEDIC SURGERY

## 2024-06-25 VITALS
OXYGEN SATURATION: 98 % | TEMPERATURE: 99 F | HEIGHT: 66 IN | BODY MASS INDEX: 21.53 KG/M2 | RESPIRATION RATE: 20 BRPM | SYSTOLIC BLOOD PRESSURE: 121 MMHG | HEART RATE: 114 BPM | DIASTOLIC BLOOD PRESSURE: 72 MMHG | WEIGHT: 134 LBS

## 2024-06-25 DIAGNOSIS — Z48.89 AFTERCARE FOLLOWING SURGERY: Primary | ICD-10-CM

## 2024-06-25 LAB — B-HCG UR QL: NEGATIVE

## 2024-06-25 PROCEDURE — 76942 ECHO GUIDE FOR BIOPSY: CPT | Performed by: ANESTHESIOLOGY

## 2024-06-25 PROCEDURE — 0MQN4ZZ REPAIR RIGHT KNEE BURSA AND LIGAMENT, PERCUTANEOUS ENDOSCOPIC APPROACH: ICD-10-PCS | Performed by: ORTHOPAEDIC SURGERY

## 2024-06-25 PROCEDURE — 81025 URINE PREGNANCY TEST: CPT

## 2024-06-25 PROCEDURE — 0SBC4ZZ EXCISION OF RIGHT KNEE JOINT, PERCUTANEOUS ENDOSCOPIC APPROACH: ICD-10-PCS | Performed by: ORTHOPAEDIC SURGERY

## 2024-06-25 DEVICE — IMPLANTABLE DEVICE: Type: IMPLANTABLE DEVICE | Site: KNEE | Status: FUNCTIONAL

## 2024-06-25 DEVICE — PEEK SWIVELOCK C,4.75X19.1MM
Type: IMPLANTABLE DEVICE | Site: KNEE | Status: FUNCTIONAL
Brand: ARTHREX®

## 2024-06-25 DEVICE — BIOSURE PK PEEK OPTIMA                                    INTERFERENCE SCREW 12 X 35 MM
Type: IMPLANTABLE DEVICE | Site: KNEE | Status: FUNCTIONAL
Brand: BIOSURE

## 2024-06-25 DEVICE — 7 X 20 MM SOFTSILK 1.5 SCREW                                    CANNULA LATEX FREE. SHEATED IN CLEAR-TRAC
Type: IMPLANTABLE DEVICE | Site: KNEE | Status: FUNCTIONAL
Brand: SOFTSILK

## 2024-06-25 DEVICE — BIOSURE PK PEEK OPTIMA                                    INTERFERENCE SCREW 11 X 30 MM
Type: IMPLANTABLE DEVICE | Site: KNEE | Status: FUNCTIONAL
Brand: BIOSURE

## 2024-06-25 RX ORDER — ROCURONIUM BROMIDE 10 MG/ML
INJECTION, SOLUTION INTRAVENOUS AS NEEDED
Status: DISCONTINUED | OUTPATIENT
Start: 2024-06-25 | End: 2024-06-25 | Stop reason: SURG

## 2024-06-25 RX ORDER — ACETAMINOPHEN 500 MG
1000 TABLET ORAL ONCE AS NEEDED
Status: COMPLETED | OUTPATIENT
Start: 2024-06-25 | End: 2024-06-25

## 2024-06-25 RX ORDER — BUPIVACAINE HYDROCHLORIDE 5 MG/ML
INJECTION, SOLUTION EPIDURAL; INTRACAUDAL AS NEEDED
Status: DISCONTINUED | OUTPATIENT
Start: 2024-06-25 | End: 2024-06-25 | Stop reason: SURG

## 2024-06-25 RX ORDER — HYDROMORPHONE HYDROCHLORIDE 1 MG/ML
0.2 INJECTION, SOLUTION INTRAMUSCULAR; INTRAVENOUS; SUBCUTANEOUS EVERY 5 MIN PRN
Status: DISCONTINUED | OUTPATIENT
Start: 2024-06-25 | End: 2024-06-25

## 2024-06-25 RX ORDER — DIPHENHYDRAMINE HYDROCHLORIDE 50 MG/ML
12.5 INJECTION INTRAMUSCULAR; INTRAVENOUS AS NEEDED
Status: DISCONTINUED | OUTPATIENT
Start: 2024-06-25 | End: 2024-06-25

## 2024-06-25 RX ORDER — TRANEXAMIC ACID 10 MG/ML
1000 INJECTION, SOLUTION INTRAVENOUS ONCE
Status: COMPLETED | OUTPATIENT
Start: 2024-06-25 | End: 2024-06-25

## 2024-06-25 RX ORDER — HYDROCODONE BITARTRATE AND ACETAMINOPHEN 5; 325 MG/1; MG/1
2 TABLET ORAL ONCE AS NEEDED
Status: COMPLETED | OUTPATIENT
Start: 2024-06-25 | End: 2024-06-25

## 2024-06-25 RX ORDER — DEXAMETHASONE SODIUM PHOSPHATE 10 MG/ML
INJECTION, SOLUTION INTRAMUSCULAR; INTRAVENOUS AS NEEDED
Status: DISCONTINUED | OUTPATIENT
Start: 2024-06-25 | End: 2024-06-25 | Stop reason: SURG

## 2024-06-25 RX ORDER — HYDROCODONE BITARTRATE AND ACETAMINOPHEN 5; 325 MG/1; MG/1
1 TABLET ORAL ONCE AS NEEDED
Status: COMPLETED | OUTPATIENT
Start: 2024-06-25 | End: 2024-06-25

## 2024-06-25 RX ORDER — PROCHLORPERAZINE EDISYLATE 5 MG/ML
5 INJECTION INTRAMUSCULAR; INTRAVENOUS EVERY 8 HOURS PRN
Status: DISCONTINUED | OUTPATIENT
Start: 2024-06-25 | End: 2024-06-25

## 2024-06-25 RX ORDER — ONDANSETRON 2 MG/ML
INJECTION INTRAMUSCULAR; INTRAVENOUS AS NEEDED
Status: DISCONTINUED | OUTPATIENT
Start: 2024-06-25 | End: 2024-06-25 | Stop reason: SURG

## 2024-06-25 RX ORDER — VANCOMYCIN HYDROCHLORIDE 1 G/20ML
INJECTION, POWDER, LYOPHILIZED, FOR SOLUTION INTRAVENOUS AS NEEDED
Status: DISCONTINUED | OUTPATIENT
Start: 2024-06-25 | End: 2024-06-25 | Stop reason: HOSPADM

## 2024-06-25 RX ORDER — KETOROLAC TROMETHAMINE 30 MG/ML
INJECTION, SOLUTION INTRAMUSCULAR; INTRAVENOUS AS NEEDED
Status: DISCONTINUED | OUTPATIENT
Start: 2024-06-25 | End: 2024-06-25 | Stop reason: SURG

## 2024-06-25 RX ORDER — SODIUM CHLORIDE, SODIUM LACTATE, POTASSIUM CHLORIDE, CALCIUM CHLORIDE 600; 310; 30; 20 MG/100ML; MG/100ML; MG/100ML; MG/100ML
INJECTION, SOLUTION INTRAVENOUS CONTINUOUS
Status: DISCONTINUED | OUTPATIENT
Start: 2024-06-25 | End: 2024-06-25

## 2024-06-25 RX ORDER — ACETAMINOPHEN 500 MG
1000 TABLET ORAL ONCE
Status: DISCONTINUED | OUTPATIENT
Start: 2024-06-25 | End: 2024-06-25 | Stop reason: HOSPADM

## 2024-06-25 RX ORDER — HYDROMORPHONE HYDROCHLORIDE 1 MG/ML
0.4 INJECTION, SOLUTION INTRAMUSCULAR; INTRAVENOUS; SUBCUTANEOUS EVERY 5 MIN PRN
Status: DISCONTINUED | OUTPATIENT
Start: 2024-06-25 | End: 2024-06-25

## 2024-06-25 RX ORDER — ONDANSETRON 2 MG/ML
4 INJECTION INTRAMUSCULAR; INTRAVENOUS EVERY 6 HOURS PRN
Status: DISCONTINUED | OUTPATIENT
Start: 2024-06-25 | End: 2024-06-25

## 2024-06-25 RX ORDER — NALOXONE HYDROCHLORIDE 0.4 MG/ML
0.08 INJECTION, SOLUTION INTRAMUSCULAR; INTRAVENOUS; SUBCUTANEOUS AS NEEDED
Status: DISCONTINUED | OUTPATIENT
Start: 2024-06-25 | End: 2024-06-25

## 2024-06-25 RX ORDER — BUPIVACAINE HYDROCHLORIDE AND EPINEPHRINE 5; 5 MG/ML; UG/ML
INJECTION, SOLUTION EPIDURAL; INTRACAUDAL; PERINEURAL AS NEEDED
Status: DISCONTINUED | OUTPATIENT
Start: 2024-06-25 | End: 2024-06-25 | Stop reason: HOSPADM

## 2024-06-25 RX ORDER — MIDAZOLAM HYDROCHLORIDE 1 MG/ML
INJECTION INTRAMUSCULAR; INTRAVENOUS AS NEEDED
Status: DISCONTINUED | OUTPATIENT
Start: 2024-06-25 | End: 2024-06-25 | Stop reason: SURG

## 2024-06-25 RX ORDER — LIDOCAINE HYDROCHLORIDE 10 MG/ML
INJECTION, SOLUTION EPIDURAL; INFILTRATION; INTRACAUDAL; PERINEURAL AS NEEDED
Status: DISCONTINUED | OUTPATIENT
Start: 2024-06-25 | End: 2024-06-25 | Stop reason: SURG

## 2024-06-25 RX ORDER — MIDAZOLAM HYDROCHLORIDE 1 MG/ML
1 INJECTION INTRAMUSCULAR; INTRAVENOUS EVERY 5 MIN PRN
Status: DISCONTINUED | OUTPATIENT
Start: 2024-06-25 | End: 2024-06-25

## 2024-06-25 RX ORDER — DEXAMETHASONE SODIUM PHOSPHATE 4 MG/ML
VIAL (ML) INJECTION AS NEEDED
Status: DISCONTINUED | OUTPATIENT
Start: 2024-06-25 | End: 2024-06-25 | Stop reason: SURG

## 2024-06-25 RX ORDER — POLYMYXIN B SULFATE 500000 [IU]/1
INJECTION, POWDER, LYOPHILIZED, FOR SOLUTION INTRAMUSCULAR; INTRATHECAL; INTRAVENOUS; OPHTHALMIC AS NEEDED
Status: DISCONTINUED | OUTPATIENT
Start: 2024-06-25 | End: 2024-06-25 | Stop reason: HOSPADM

## 2024-06-25 RX ORDER — EPHEDRINE SULFATE 50 MG/ML
INJECTION INTRAVENOUS AS NEEDED
Status: DISCONTINUED | OUTPATIENT
Start: 2024-06-25 | End: 2024-06-25 | Stop reason: SURG

## 2024-06-25 RX ORDER — ACETAMINOPHEN 500 MG
TABLET ORAL
Status: COMPLETED
Start: 2024-06-25 | End: 2024-06-25

## 2024-06-25 RX ORDER — HYDROMORPHONE HYDROCHLORIDE 1 MG/ML
0.6 INJECTION, SOLUTION INTRAMUSCULAR; INTRAVENOUS; SUBCUTANEOUS EVERY 5 MIN PRN
Status: DISCONTINUED | OUTPATIENT
Start: 2024-06-25 | End: 2024-06-25

## 2024-06-25 RX ADMIN — EPHEDRINE SULFATE 10 MG: 50 INJECTION INTRAVENOUS at 08:50:00

## 2024-06-25 RX ADMIN — SODIUM CHLORIDE, SODIUM LACTATE, POTASSIUM CHLORIDE, CALCIUM CHLORIDE: 600; 310; 30; 20 INJECTION, SOLUTION INTRAVENOUS at 11:43:00

## 2024-06-25 RX ADMIN — ROCURONIUM BROMIDE 40 MG: 10 INJECTION, SOLUTION INTRAVENOUS at 07:56:00

## 2024-06-25 RX ADMIN — DEXAMETHASONE SODIUM PHOSPHATE 8 MG: 4 MG/ML VIAL (ML) INJECTION at 08:11:00

## 2024-06-25 RX ADMIN — MIDAZOLAM HYDROCHLORIDE 2 MG: 1 INJECTION INTRAMUSCULAR; INTRAVENOUS at 07:49:00

## 2024-06-25 RX ADMIN — BUPIVACAINE HYDROCHLORIDE 25 ML: 5 INJECTION, SOLUTION EPIDURAL; INTRACAUDAL at 07:59:00

## 2024-06-25 RX ADMIN — ONDANSETRON 4 MG: 2 INJECTION INTRAMUSCULAR; INTRAVENOUS at 08:21:00

## 2024-06-25 RX ADMIN — DEXAMETHASONE SODIUM PHOSPHATE 2 MG: 10 INJECTION, SOLUTION INTRAMUSCULAR; INTRAVENOUS at 08:07:00

## 2024-06-25 RX ADMIN — TRANEXAMIC ACID 1000 MG: 10 INJECTION, SOLUTION INTRAVENOUS at 08:07:00

## 2024-06-25 RX ADMIN — LIDOCAINE HYDROCHLORIDE 25 MG: 10 INJECTION, SOLUTION EPIDURAL; INFILTRATION; INTRACAUDAL; PERINEURAL at 07:52:00

## 2024-06-25 RX ADMIN — KETOROLAC TROMETHAMINE 30 MG: 30 INJECTION, SOLUTION INTRAMUSCULAR; INTRAVENOUS at 11:24:00

## 2024-06-25 NOTE — ANESTHESIA PREPROCEDURE EVALUATION
PRE-OP EVALUATION    Patient Name: Arianna Brown    Admit Diagnosis: Rupture of posterior cruciate ligament of right knee, subsequent encounter [S83.521D]    Pre-op Diagnosis: Rupture of posterior cruciate ligament of right knee, subsequent encounter [S83.521D]    RIGHT KNEE ARTHROSCOPY POSTERIOR CRUCIATE LIGAMENT  RECONSTRUCTION CADAVER: TIBIALIS ANTERIOR VERSUS ACHILLES ALLOGRAFT, SYNOVECTOMY    Anesthesia Procedure: RIGHT KNEE ARTHROSCOPY POSTERIOR CRUCIATE LIGAMENT  RECONSTRUCTION CADAVER: TIBIALIS ANTERIOR VERSUS ACHILLES ALLOGRAFT, SYNOVECTOMY (Right)    Surgeons and Role:     * Jose G Contreras MD - Primary    Pre-op vitals reviewed.  Temp: 99.1 °F (37.3 °C)  Pulse: 95  Resp: 18  BP: 119/77  SpO2: 100 %  Body mass index is 21.63 kg/m².    Current medications reviewed.  Hospital Medications:   [Transfer Hold] acetaminophen (Tylenol Extra Strength) tab 1,000 mg  1,000 mg Oral Once    lactated ringers infusion   Intravenous Continuous    [COMPLETED] ceFAZolin (Ancef) 2g in 10mL IV syringe premix  2 g Intravenous Once    [COMPLETED] tranexamic acid in sodium chloride 0.7% (Cyklokapron) 1000 mg/100mL infusion premix 1,000 mg  1,000 mg Intravenous Once       Outpatient Medications:     Medications Prior to Admission   Medication Sig Dispense Refill Last Dose    sertraline 50 MG Oral Tab Take 1.5 tablets (75 mg total) by mouth daily.   2024    [] hydrocortisone (ANUSOL-HC) 25 MG Rectal Suppos Place 1 suppository (25 mg total) rectally 2 (two) times daily for 10 days. 20 suppository 0        Allergies: Reglan [metoclopramide]      Anesthesia Evaluation    Patient summary reviewed.    Anesthetic Complications  (-) history of anesthetic complications         GI/Hepatic/Renal    Negative GI/hepatic/renal ROS.                             Cardiovascular    Negative cardiovascular ROS.    Exercise tolerance: good     MET: >4    (-) obesity                                       Endo/Other    Negative endo/other  ROS.                              Pulmonary    Negative pulmonary ROS.                       Neuro/Psych    Negative neuro/psych ROS.  (+) depression             (+) headaches                   History reviewed. No pertinent surgical history.  Social History     Socioeconomic History    Marital status: Single   Tobacco Use    Smoking status: Never    Smokeless tobacco: Never    Tobacco comments:     non-smoker   Vaping Use    Vaping status: Never Used   Substance and Sexual Activity    Alcohol use: Never    Drug use: Never    Sexual activity: Never     Partners: Male   Other Topics Concern    Caffeine Concern Yes     Comment: 1-2x cups daily    Exercise No    Seat Belt Yes     History   Drug Use Unknown     Available pre-op labs reviewed.  Lab Results   Component Value Date    WBC 7.4 03/31/2024    RBC 5.39 (H) 03/31/2024    HGB 15.1 03/31/2024    HCT 45.4 03/31/2024    MCV 84.2 03/31/2024    MCH 28.0 03/31/2024    MCHC 33.3 03/31/2024    RDW 12.2 03/31/2024    .0 03/31/2024     Lab Results   Component Value Date     03/31/2024    K 3.7 03/31/2024     03/31/2024    CO2 27.0 03/31/2024    BUN 10 03/31/2024    CREATSERUM 0.86 03/31/2024    GLU 99 03/31/2024    CA 9.7 03/31/2024            Airway      Mallampati: I  Mouth opening: 3 FB  TM distance: 4 - 6 cm  Neck ROM: full Cardiovascular    Cardiovascular exam normal.         Dental  Comment: No dental anomalies readily apparent from bedside exam except what is diagrammed below. However, cannot exclude evolving dental disease or vulnerability given limitations of exam. Will reassess post induction and prior to any airway manipulation.     Permanent retainers           Pulmonary    Pulmonary exam normal.                 Other findings              ASA: 1   Plan: general and regional  NPO status verified and patient meets guidelines.    Post-procedure pain management plan discussed with surgeon and patient.  Surgeon requests: regional block  Comment:  I discussed the rare but serious complications of anesthesia, including but not limited to cardiovascular complications, allergic reaction to medications, pulomary/respiratory complications, post-operative nausea, post-op pain, damage to dentition, aspiration, or sore throat. Plan for post induction adductor canal block discussed along with risks/benefits and indictations. The patient expressed understanding of plan and agreement.     Plan/risks discussed with: patient, mother and father                Present on Admission:  **None**

## 2024-06-25 NOTE — H&P
The below referenced H&P was reviewed by Jose G Contreras MD, the patient was examined and no significant changes have occurred in the patient's condition since the H&P was performed.  I discussed with the patient and/or legal representative the potential benefits, risks and side effects of this procedure; the likelihood of the patient achieving goals; and potential problems that might occur during recuperation.  I discussed reasonable alternatives to the procedure, including risks, benefits and side effects related to the alternatives and risks related to not receiving this procedure.  We will proceed with procedure as planned.           Orthopaedic Surgery  [address] [place] [id number]  [phone]     PRE SURGICAL - HISTORY AND PHYSICAL EXAMINATION     Name: Arianna Brown   MRN: [id number]    CC: Right Knee Pain and instability     REFERRED BY: Kelley Dela Cruz MD    HPI:   Arianna Brown is a very pleasant 18 year old female who presents today for evaluation of Right knee pain and instability and recent completion of MRI demonstrating posterior cruciate ligament rupture.     To summarize:  right knee pain and swelling after she was ice-skating on November 12, 2023 and fell directly onto her right knee.  She may have twisted her knee in the process of falling and has since had persistent swelling, stiffness and pain with walking and weightbearing.  She is otherwise very active and is a full-time school student studying psychology.  She presented to the immediate care in [place] and was referred to our service for further evaluation and management. She is a freshman in college. She enjoys [employer] music.     Today, she is in office today for pain in right knee and has been going to physical therapy with slight improvement. Pain levels today are 5/10. Overall she is doing well. She mentions shaking of her knee when she attempts to fully  an extended motion or even when kneeling. She also  mentions popping and clicking of the knee when moving. This makes the knee feels an immense amount of pressure and pain.     She works at a .     PMH:   Past Medical History:    Anxiety    Attention deficit hyperactivity disorder (ADHD)    not on any medications    Autism spectrum disorder (HCC)    pt states she was thought to be on  spectrum when she was school age- does not feel that is correct    Back problem    Depression    Hx of motion sickness    Migraines    Muscle weakness    bilateral hands    Seizure disorder (HCC)    twice in 5th grade- none since then       PAST SURGICAL HX:  History reviewed. No pertinent surgical history.    FAMILY HX:  Family History   Problem Relation Age of Onset    Breast Cancer Maternal Grandmother     Breast Cancer Maternal Aunt     Breast Cancer Maternal Great-Grandmother        ALLERGIES:  Patient has no known allergies.    MEDICATIONS:   Current Outpatient Medications   Medication Sig Dispense Refill    Acetaminophen 500 MG Oral Cap Take 2 capsules (1,000 mg total) by mouth every 6 (six) hours for 14 days. 50 capsule 1    traMADol 50 MG Oral Tab Please take 1 tablet every 6 hours as needed for pain. Max of 8 tablets per day. Collaborating - Dr. Jose G Contreras. 20 tablet 1    Sennosides-Docusate Sodium 8.6-50 MG Oral Cap Take 1 capsule by mouth daily as needed. 30 capsule 1    ondansetron (ZOFRAN) 4 mg tablet Take 1 tablet by mouth every 8 hours as needed for nausea. 8 tablet 0    Meloxicam 15 MG Oral Tab Take 1 tablet (15 mg total) by mouth daily. 14 tablet 1    Acetaminophen 500 MG Oral Cap Take 2 capsules (1,000 mg total) by mouth every 6 (six) hours for 14 days. 50 capsule 1    traMADol 50 MG Oral Tab Please take 1 tablet every 6 hours as needed for pain. Max of 8 tablets per day. Collaborating - Dr. Jose G Contreras. 20 tablet 1    Sennosides-Docusate Sodium 8.6-50 MG Oral Cap Take 1 capsule by mouth daily as needed. 30 capsule 1    ondansetron (ZOFRAN) 4 mg tablet Take 1  tablet by mouth every 8 hours as needed for nausea. 8 tablet 0    ondansetron (ZOFRAN) 4 mg tablet Take 1 tablet by mouth every 8 hours as needed for nausea. 8 tablet 0       ROS: A comprehensive 14 point review of systems was performed and was negative aside from the aforementioned per history of present illness.    SOCIAL HX:  Social History     Tobacco Use    Smoking status: Never    Smokeless tobacco: Never    Tobacco comments:     non-smoker   Substance Use Topics    Alcohol use: Never       PE:   Vitals:    05/01/24 1302 05/31/24 1658   Weight: 130 lb (59 kg) 130 lb (59 kg)   Height: 5' 6\" (1.676 m) 5' 6\" (1.676 m)     Estimated body mass index is 20.98 kg/m² as calculated from the following:    Height as of this encounter: 5' 6\" (1.676 m).    Weight as of this encounter: 130 lb (59 kg).    Physical Exam  Constitutional:       Appearance: Normal appearance.   HENT:      Head: Normocephalic and atraumatic.   Eyes:      Extraocular Movements: Extraocular movements intact.   Neck:      Musculoskeletal: Normal range of motion and neck supple.   Cardiovascular:      Pulses: Normal pulses.   Pulmonary:      Effort: Pulmonary effort is normal. No respiratory distress.   Abdominal:      General: There is no distension.   Skin:     General: Skin is warm.      Capillary Refill: Capillary refill takes less than 2 seconds.      Findings: No bruising.   Neurological:      General: No focal deficit present.      Mental Status: Alert.   Psychiatric:         Mood and Affect: Mood normal.     Examination of the right knee demonstrates:     Skin is intact, warm and dry.   Atrophy: mild    Effusion: none    Joint line tenderness: none  Crepitation: none   Clinton: Negative   Patellar mobility: normal without apprehension  J-sign: none    ROM: Extension full  Flexion 140 degrees  ACL:  Negative Lachman, Negative Pivot Shift   PCL:   2-3+ posterior drawer and subluxation and positive reverse pivot shift.  Collateral Ligaments:  Stable to Varus and Valgus stress at 0 and 30 degrees  Strength: mild weakness   Hip joint: normal pain-free ROM   Gait:  normal   Leg length: equal and symmetric  Alignment:  neutral     No obvious peripheral edema noted.   Distal neurovascular exam demonstrates normal perfusion, intact sensation to light touch and full strength.     Examination of the contralateral knee demonstrates:  No significant atrophy, swelling or effusion. Full range of motion. Neurovascularly intact distally.    Radiographic Examination/Diagnostics:  XR and MRI of the knee personally viewed, independently interpreted and radiology report was reviewed.      PROCEDURE:  MRI KNEE, RIGHT ([id number]=[id number])     COMPARISON:  None.     INDICATIONS:  S83.206A Positive Clinton test of right knee, initial encounter M23.91 Locked knee, right S80.01XA Contusion of right knee, initial encounter     TECHNIQUE:  Axial, coronal, and sagittal proton density with and without fat saturation images were obtained.     PATIENT STATED HISTORY: (As transcribed by Technologist)  Patient states ice skating injury on 11/13/23. She has difficulty with flexion of her right knee, pain, swelling, and bruising.         FINDINGS:    LIGAMENTS:          There is a full-thickness defect in the posterior cruciate ligament at the mid substance of the ligament as seen for example sagittal proton density sequence series 601, image 15. Full-thickness defect is also appreciated on coronal  T2 fat-sat sequence series 401, image 19. The ACL is intact.  Lateral ligamentous complex is intact.  Subchondral marrow edema is noted anterior lateral tibial plateau consistent with bone contusion.    MENISCI:            There is medial and anterior subluxation of body and anterior horn of medial meniscus respectively.  There is no focal defect detected although injury to root ligament attachment is suspected based on this finding.  TENDONS:            The tendinous insertions about the knee are  intact without significant tendinosis or tears.  MUSCULATURE:        No evidence of strain, edema, or atrophy.  BONY COMPARTMENTS:  No evidence of chondromalacia or cartilage defects.  Normal marrow and cortical signal.  SYNOVIUM:           There is a moderate joint effusion.                      Impression   CONCLUSION:    1. Full-thickness tear mid substance of PCL is noted.  2. Anterior and medial subluxation of anterior horn and body of medial meniscus respectively are noted.  Findings suggest injury to root ligaments although full-thickness defect is not detected.  3. Bone contusion anterior lateral tibial plateau.  4. Joint effusion.               IMPRESSION: Arianna Brown is a 18 year old female with posterior cruciate ligament rupture.  To successfully achieve goals of return to functional activity and avoiding future knee instability events, I advocate for posterior cruciate ligament reconstruction. Graft recommendation of allograft.    She has completed an extensive course of physician-directed physical therapy without symptomatic resolution.  Significant instability on physical examination as well as functional limitations in daily life.    PLAN:   We had a detailed discussion outlining the etiology, anatomy, pathophysiology, and natural history of posterior cruciate ligament injuries. Imaging was reviewed in detail and correlated to a 3-dimensional model of the knee.     I had a detailed discussion with the patient  regarding an overview in approach to posterior cruciate ligament injuries. We discussed the role of possible nonsurgical management versus operative PCL reconstruction, ultimately, we agreed that proceeding with surgical intervention would likely offer the best opportunity for symptomatic relief and functional recovery.     I used radiographic studies and a 3-dimensional model to outline her pathology, as well as general surgical principles. I outlined that the PCL is a dynamic structure  that loses integrity after injury, which does not render it capable of repair.  As such, reconstruction is required with a separate structure to take the place of the native PCL.  This can be accomplished via allograft, cadaveric tissue, or autograft-the patient's native tissue.     We reviewed the risks associated with arthroscopic-assisted posterior cruciate ligament (PCL) reconstruction. In particular we discussed risks that include, but are not limited to infection, potential transient or permanent injury to nerves or blood vessels, joint stiffness, persistent pain, need for future operation, failure of healing, wound complications, failure of therapeutic intervention, risk of re-injury, fixation failure, deep vein thrombosis and pulmonary embolism. We discussed the proposed rehabilitation timeline as well as expected postoperative restrictions. Arianna voiced a good understanding of treatment options, risks and benefits, postoperative instructions, rehabilitation timeline, and restrictions. She was given the opportunity to ask questions, which were all answered to the best of my ability and to her satisfaction. Arianna will work with my office to arrange a time for surgery and obtain any medical clearance information necessary. My pre-operative information packet, which details the process and answers many FAQ's will be provided. She was encouraged to call the office with any further questions or concerns.     I spent 60 minutes in preparation to see the patient, counseling/education of relevant pathology, discussing imaging results, ordering physical therapy intervention - including pre and post surgery, DME fitting, surgical counseling and care coordination.        FOLLOW-UP:  Post-Operative Visit, POD 6 with Dotty Covarrubias PA-C. Post op XR of the knee at this visit.         Jose G Contreras MD  Knee, Shoulder, & Elbow Surgery / Sports Medicine Specialist  Orthopaedic Surgery  [address] [place] [address]  [id number]   [email or web address]  [email or web address]  t: [phone]  o: [phone]  f: [phone]      This note was dictated using Dragon software.  While it was briefly proofread prior to completion, some grammatical, spelling, and word choice errors due to dictation may still occur.

## 2024-06-25 NOTE — OPERATIVE REPORT
OPERATIVE RECORD  ATTENDING SURGEON: JUAN LOPEZ MD  ASSISTANT(S): Moshe Leung (AJ)  STATEMENT OF MEDICAL NECESSITY  The aid of assistant Moshe Leung (AJ) was needed for patient positioning, preparation, drape, retraction,  wound closure, dressing application and critical portions of the procedure.     PRELIMINARY DIAGNOSIS:  1.  Right Posterior Cruciate Ligament Rupture     POSTOPERATIVE DIAGNOSIS:  1.  Right Posterior Cruciate Ligament Rupture  2.  Right Knee Intra-articular Synovitis    THE OPERATION:  1.  Right Arthroscopically Aided Posterior Cruciate Ligament Reconstruction with Achilles Tendon Allograft (88807)  2.  Right Knee Arthroscopy with Major Synovectomy (Medial /patellofemoral / lateral compartments) (41655)     ANESTHESIA: [place] + Regional Block  ANESTHESIOLOGIST:  Angeles Shelley  ANTIBIOTICS: Cefazolin 2g within 60 minutes of surgical incision.    IMPLANTS:   Implant Name Type Inv. Item Serial No.  Lot No. LRB No. Used Action   GRAFT HUM TISS FRZN ACHILLES TEND W/ BNE BLK - [id number]  GRAFT HUM TISS FRZN ACHILLES TEND W/ BNE BLK [id number] [employer]  [id number] Right 1 Implanted   SCREW INTFR 7X20MM TI ANKIT THRD RND HD - SNA  SCREW INTFR 7X20MM TI ANKIT THRD RND HD NA [employer]  [id number] Right 1 Implanted   BIOSURE PK SCREW ,11MM X30MM   N/A [employer] ([facility]th[facility]) [id number] Right 1 Implanted   SCREW INTFR [id number] PEEK OPTMA KNEE - SN/A  SCREW INTFR [id number] PEEK OPTMA KNEE N/A [employer]  [id number] Right 1 Implanted   ANCHOR SUT L19.1MM DIA4.75MM PEEK FT KNOTLESS - SN/A  ANCHOR SUT L19.1MM DIA4.75MM PEEK FT KNOTLESS N/A [employer]  [id number] Right 1 Implanted     PATHOLOGY/CULTURES: None  ESTIMATED BLOOD LOSS: Blood Output: 50 mL (6/25/2024 11:23 AM)      COMPLICATIONS: No intraoperative nor immediate postoperative complications noted.  COUNTS: All sponge and needle counts were correct at the conclusion of the procedure.  TOURNIQUET TIME: Not  Applicable  OPERATIVE FINDINGS:  Evidence of full-thickness posterior cruciate ligament disruption without integrity instability.  3+ posterior drawer with physical examination.  Successfully managed with arthroscopically assisted posterior cruciate ligament reconstruction with Achilles allograft.  This included screw fixation into the femoral footprint and peek screw fixation with swivel lock backup on the tibial side.  Excellent stability achieved at conclusion of procedure.  Size 11.5 mm tibial tunnel and size 10.5 mm femoral tunnel.  Mild to moderate synovitis successfully managed with synovectomy.    Indications:  Arianna is a 18 year old female with history, physical examination, and imaging findings consistent with posterior cruciate Ligament Rupture.  Unfortunately she failed extensive attempts at conservative nonsurgical management.  Operative and nonoperative options were discussed with her in my office and we ultimately agreed that surgery would provide the highest likelihood of symptomatic relief and functional improvement.  The risks and benefits of surgery as well as the postoperative rehabilitation plan were reviewed. She voiced a good understanding of treatment options, risks and benefits, and alternatives to surgery. We discuss in detail the anticipated rehabilitation timeline and commitment to physical therapy that was required to attain a satisfactory result. She was given the opportunity to ask questions, which were all answered to the best of my ability and to her satisfaction. Arianna wished to proceed.   On the day of surgery, the Arianna was seen in the preoperative area.  I confirmed her identity by name and birthday. We reviewed and confirmed the surgical consent including laterality.  The surgical site was marked with my initials.  We re-reviewed the risks and benefits of the procedure and I answered all additional questions to her satisfaction.      OPERATIVE REPORT:   Arianna was taken to the  operating room in stable condition.    The patient was positioned in the supine position. They subsequently underwent standard prep and drape. A nonsterile tourniquet was applied to the operative limb. An adjustable lateral post was utilized along with foot positioner at end of bed to allow for flexion. A preoperative timeout was performed confirming the correct surgical site, procedure, and preoperative imaging was reviewed.      Exam under anesthesia demonstrated a pivot shift and 2B Lachman's. The knee was stable to varus and valgus stress at 0 and 30°. There was no posterior instability.      After time out, the procedure commenced with diagnostic knee arthroscopy was performed with standard anterolateral visualization portal was made with a #15 blade and a 4.0 mm arthroscope was introduced.     Notably, the Achilles allograft was prepared on the back table by Moshe Leung (AJ), surgical assistant.  This was prepared to specifications of 10 mm x 20 mm bone plug.    Diagnostic arthroscopy revealed:     Suprapatellar No evidence of loose bodies   Patellofemoral Normal Cartilage surfaces   Gutters Normal appearance without   Lateral compartment Grade 1 tibial cartilage  Grade 1 femoral cartilage  Intact lateral meniscus   Medial compartment Grade 1 tibial cartilage  Grade 1 femoral cartilage  Intact medial meniscus  No pathologic plica   Ligaments ACL intact  PCL torn with limited integrity  Popliteus intact    Other Inflamed synovium and pathologic tissue carefully debrided and excised with the aid of 4.0 mm curved mechanical shaver and Coblation to maintain hemostasis.  This was done on medial, patellofemoral and lateral compartments.         The intercondylar notch was identified and the remnant of the torn posterior cruciate ligament itself was then debrided using a 4mm arthroscopic shaver.    The femoral socket was drilled by placing a flexible guidepin thru the anterior lateral portal using a 10.5 mm  reamer.  The femoral socket was drilled to 20mm depth in order to accommodate and recess the Achilles tendon allograft. A #2 Orthocord passing suture was placed to facilitate graft shuttling. A Yankauer suction tip was used to extract bone debris and ensure an intact femoral tunnel. Shaver was introduced to remove any excess bony debris in the posterior aspect of the knee.     The tibial footprint of the posterior cruciate ligament was posteriorly identified with careful dissection using a curved Coblation wand as well as 70 degree arthroscope.  This ultimately allowed for exposure and visualization after which a guidepin was shuttled and this was reamed with a 10.5 mm reamer which was ultimately dilated to an 11.5 mm diameter.  Notably, this was due to initial gradual dilation and difficulty passing this graft.    Ultimately the PCL graft was tensioned with the knee in flexion and fixated with a 12 mm x 35 mm peek screw followed by swivel lock backup fixation.  This allowed for excellent stability to be achieved of the knee.      The wound itself was then thoroughly irrigated using antibiotic saline (dilute Polymyxin B and Vancomycin). The deep subcutaneous layer was then closed using 0 Monocryl. This was then followed by 2-0 Monocryl for the subcutaneous and skin followed by 3-0 Monocryl subcuticular. Portal sites from the arthroscopy case were then closed using 3-0 Moncryl. Exofin and steri strips were applied. A compression dressing was then applied and Arianna was placed in a knee brace locked in extension.       The final count prior to closure was correct. The patient tolerated the procedure well without complications.     Arianna was taken to the recovery room in a stable condition with plan for ambulatory discharge to home. She was provided my postoperative discharge booklet, appropriate home exercises, script for outpatient physical therapy, and a copy of my rehabilitation guidelines.      POST OPERATIVE  PLAN:  Activity Precautions: WBAT with knee brace in place. Crutches to be weaned by Physical Therapy.   DVT Prophylaxis: Not indicated  Follow-up Visit: POD # 7-14      Jose G Contreras MD  Knee, Shoulder, & Elbow Surgery / Sports Medicine Specialist  Orthopaedic Surgery  [address] [place] [address] [id number] [place] [address].[email or web address]  t: [phone]  o: [phone]  f: [phone]    This note was dictated using Dragon software.  While it was briefly proofread prior to completion, some grammatical, spelling, and word choice errors due to dictation may still occur.

## 2024-06-25 NOTE — ANESTHESIA PROCEDURE NOTES
Airway  Date/Time: 6/25/2024 7:56 AM  Urgency: elective      General Information and Staff    Patient location during procedure: OR  Anesthesiologist: Angeles Shelley MD  Performed: anesthesiologist   Performed by: Angeles Shelley MD  Authorized by: Angeles Shelley MD      Indications and Patient Condition  Indications for airway management: anesthesia  Sedation level: deep  Preoxygenated: yes  Patient position: sniffing  Mask difficulty assessment: 1 - vent by mask    Final Airway Details  Final airway type: endotracheal airway      Successful airway: ETT  Cuffed: yes   Successful intubation technique: direct laryngoscopy  Facilitating devices/methods: intubating stylet  Endotracheal tube insertion site: oral  Blade: Cayden  Blade size: #3  ETT size (mm): 7.0    Cormack-Lehane Classification: grade I - full view of glottis  Placement verified by: capnometry   Cuff volume (mL): 8  Measured from: lips  ETT to lips (cm): 22  Number of attempts at approach: 1  Number of other approaches attempted: 0    Additional Comments  Dentition unchanged from pre op exam

## 2024-06-25 NOTE — BRIEF OP NOTE
Pre-Operative Diagnosis: Rupture of posterior cruciate ligament of right knee, subsequent encounter [S83.521D]     Post-Operative Diagnosis: Rupture of posterior cruciate ligament of right knee, subsequent encounter [S83.521D]      Procedure Performed:   RIGHT KNEE ARTHROSCOPY POSTERIOR CRUCIATE LIGAMENT  RECONSTRUCTION CADAVER: TIBIALIS ANTERIOR VERSUS ACHILLES ALLOGRAFT, SYNOVECTOMY    Surgeons and Role:     * Jose G Contreras MD - Primary    Assistant(s):  Surgical Assistant.: Moshe Leung     Surgical Findings: 11 mm achilles Allograft. Excellent stability achieved.      Specimen: None     Estimated Blood Loss: Blood Output: 50 mL (6/25/2024 11:23 AM)      Dictation Number:  None    Jose G Contreras MD  6/25/2024  12:08 PM

## 2024-06-25 NOTE — ANESTHESIA POSTPROCEDURE EVALUATION
[facility]    Arianna Brown Patient Status:  Hospital Outpatient Surgery   Age/Gender 18 year old female MRN [id number]   Location [facility] PERIOPERATIVE SERVICE Attending Jose G Contreras MD   Hosp Day # 0 PCP Kelley Dela Cruz MD       Anesthesia Post-op Note    RIGHT KNEE ARTHROSCOPY POSTERIOR CRUCIATE LIGAMENT  RECONSTRUCTION CADAVER: TIBIALIS ANTERIOR VERSUS ACHILLES ALLOGRAFT, SYNOVECTOMY    Procedure Summary       Date: 06/25/24 Room / Location:  MAIN OR 14 /  MAIN OR    Anesthesia Start: 0747 Anesthesia Stop: 1200    Procedure: RIGHT KNEE ARTHROSCOPY POSTERIOR CRUCIATE LIGAMENT  RECONSTRUCTION CADAVER: TIBIALIS ANTERIOR VERSUS ACHILLES ALLOGRAFT, SYNOVECTOMY (Right: Knee) Diagnosis:       Rupture of posterior cruciate ligament of right knee, subsequent encounter      (Rupture of posterior cruciate ligament of right knee, subsequent encounter [S83.521D])    Surgeons: Jose G Contreras MD Anesthesiologist: Angeles Shelley MD    Anesthesia Type: general, regional ASA Status: 1            Anesthesia Type: general, regional    Vitals Value Taken Time   /72 06/25/24 1326   Temp 98.5 °F (36.9 °C) 06/25/24 1240   Pulse 112 06/25/24 1334   Resp 25 06/25/24 1316   SpO2 98 % 06/25/24 1334   Vitals shown include unfiled device data.    Patient Location: PACU    Anesthesia Type: general    Airway Patency: patent    Postop Pain Control: adequate    Mental Status: mildly sedated but able to meaningfully participate in the post-anesthesia evaluation    Nausea/Vomiting: none    Cardiopulmonary/Hydration status: stable euvolemic    Complications: no apparent anesthesia related complications    Postop vital signs: stable    Comments: Hand off to receiving nurse completed with essential components of patient's care reviewed, RN expressed comfort with assuming care, and all questions answered.     Dental Exam: Unchanged from Preop    Patient to be discharged from PACU when criteria met.

## 2024-06-25 NOTE — ANESTHESIA PROCEDURE NOTES
Regional Block    Date/Time: 6/25/2024 7:59 AM    Performed by: Angeles Shelley MD  Authorized by: Angeles Shelley MD      General Information and Staff    Start Time:  6/25/2024 7:59 AM  End Time:  6/25/2024 8:01 AM  Anesthesiologist:  Angeles Shelley MD  Performed by:  Anesthesiologist  Patient Location:  OR    Block Placement: Post Induction  Site Identification: real time ultrasound guided and image stored and retrievable    Block site/laterality marked before start: site marked  Reason for Block: at surgeon's request and post-op pain management    Preanesthetic Checklist: 2 patient identifers, IV checked, site marked, risks and benefits discussed, monitors and equipment checked, pre-op evaluation, timeout performed, anesthesia consent, sterile technique used, no prohibitive neurological deficits and no local skin infection at insertion site      Procedure Details    Patient Position:  Supine  Prep: ChloraPrep    Monitoring:  Cardiac monitor, continuous pulse ox and blood pressure cuff  Block Type:  Adductor canal  Laterality:  Right  Injection Technique:  Single-shot    Needle    Needle Type:  Short-bevel and echogenic  Needle Gauge:  22 G  Needle Length:  100 mm  Needle Localization:  Ultrasound guidance  Reason for Ultrasound Use: appropriate spread of the medication was noted in real time and no ultrasound evidence of intravascular and/or intraneural injection            Assessment    Injection Assessment:  Good spread noted, negative resistance, negative aspiration for heme, incremental injection and low pressure  Heart Rate Change: No    - Patient tolerated block procedure well without evidence of immediate block related complications.     Medications  6/25/2024 7:59 AM      Additional Comments    Medication:  Bupivacaine 0.5% 25mL with 1:200,000 epi

## 2024-06-26 ENCOUNTER — OFFICE VISIT (OUTPATIENT)
Dept: PHYSICAL THERAPY | Age: 19
End: 2024-06-26
Attending: ORTHOPAEDIC SURGERY
Payer: COMMERCIAL

## 2024-06-26 DIAGNOSIS — S83.521D RUPTURE OF POSTERIOR CRUCIATE LIGAMENT OF RIGHT KNEE, SUBSEQUENT ENCOUNTER: Primary | ICD-10-CM

## 2024-06-26 PROCEDURE — 97161 PT EVAL LOW COMPLEX 20 MIN: CPT

## 2024-06-26 PROCEDURE — 97110 THERAPEUTIC EXERCISES: CPT

## 2024-06-26 NOTE — PROGRESS NOTES
PHYSICAL THERAPY INITIAL EVALUATION     Date of service: 6/26/2024  Dx: Rupture of posterior cruciate ligament of right knee, subsequent encounter (S83.521D)        Insurance: [facility]  Insurance Limits: auth required  Visit #: 1  Authorized # of Visits: 12  POC/Auth Expiration: 9/24/24  Authorizing Physician/Provider: Ben     PATIENT SUMMARY     History/CELINA: \"I was ice skating for fun and while I was moving, my left skate got caught into the ice. I tripped and the right knee seemed to take the brunt of it.\"     The patient reports that her knee hurt after she fell. \"I realized something was wrong when it locked up. I couldn't bend it back. Then, in the middle of the night, it hurt to move and it really locked up. I went to urgent care in the morning and then sent me to an orthopedic.\"    DOI/S: 6/25/24    Alleviating Factors: The patient is taking Tylenol currently for pain management.     PMH: The patient's PMH was reviewed with the patient including allergies, medications, and surgical and medical history. Patient  has a past medical history of Anxiety, Attention deficit hyperactivity disorder (ADHD), Autism spectrum disorder (HCC), Back problem, Depression, motion sickness, Migraines, Muscle weakness, and Seizure disorder (HCC).    PLF/Personal Goals: \"I just want a normal knee.\"     Occupation: [employer]     OBJECTIVE:     Pain/Symptom Presentation:    Pain at rest: 2/10  Pain at worst: 5-6/10    Outcomes Measure(s):  LEFS: 6.25% function    Activity Measures:  Sleeping: Mild Activity Limitation: Patient is able to sleep up to 6 hours a night.   Sitting: Severe Activity Limitation: Patient is only able to sit with knee immobilizer on.   Standing: Severe Activity Limitation: Patient is able to stand only with knee immobilizer with crutches.   Car Transfers: Severe Activity Limitation: Patient is only able to perform car transfers with knee immobilizer and crutches.   Walking: Severe Activity  Limitation: Patient is only able to walk with knee immobilizer and crutches.  Driving: Extreme Activity Limitation: Patient is unable to drive.   Squatting: Extreme Activity Limitation: Patient is unable to squat.   Ascending Stairs: Severe Activity Limitation: Patient navigates up the stairs with knee immobilizer and crutches, step-to gait pattern.   Descending Stairs: Severe Activity Limitation: Patient navigates down the stairs with knee immobilizer and crutches, step-to gait pattern.   Stairs: Severe Activity Limitation: Patient is able to navigate 2-3 flights of stairs, but only with knee immobilizer and crutches, step-to gait pattern.  Running Short Distances: Extreme Activity Limitation: Patient is unable to run.    Inspection/Observation: Patient demonstrates diffuse swelling in her right knee. The patient is with deficits in quad activation with noted quad atrophy.   Gait: Patient ambulates into the clinic with knee immobilizer on with (B) axillary crutches.  Palpation: Patient demonstrates guarded knee flexion PROM.  Sensation: Patient is with some numbness over the anterolateral right knee.     ROM:   Knee Motion PROM AROM    Right Left Right Left   Knee Extension 0 0 0 0   Knee Flexion 57 140 135 135   *indicates activity was associated with pain    Strength/MMT:   Knee Motion Strength    Right Left   Knee Extension 3-/5 5/5   Knee Flexion 3-/5 5/5   *indicates activity was associated with pain    Hip Motion Strength    Right Left   Hip Flexion 4-/5 4+/5   Hip Extension 3+/5 4+/5   Hip ABD 3+/5 4+/5   *indicates activity was associated with pain    ASSESSMENT:     RONALD is a 18 year old female that presents to physical therapy evaluation s/p (R) PCL reconstruction 6/25/24 by Dr. Contreras. Her injury occurred when she fell on her right knee while she was ice skating. The patient demonstrates anticipated deficits in quad activation, pain and swelling management, strength, stability, endurance, and function.  The patient is with anticipated deficits in quad activation and recruitment due to diffuse swelling throughout her right knee. She is currently walking with a knee immobilizer on her right LE with (B) axillary crutches. Current functional limitations include, but are not limited to, WB through her right leg for walking, prolonged standing, prolonged sitting, squatting, and navigating stairs. The patient would benefit from physical therapy to facilitate progression in mobility, strength, endurance, stability, and balance for full return to ADL's and PLF.      Precautions/WB Status: WBAT  Education or Treatment Limitation(s): None  Rehab Potential: Excellent    TREATMENT:     Initial Evaluation: x 15min    Therapeutic Activities: x 3min  Patient Education: Patient was educated on anatomy and pathophysiology of current condition, rationale for physical therapy, anticipated treatment interventions, prognosis, timeline for recovery, and expected functional outcomes based on evaluative findings.     Therapeutic Exercise: x 12min  PROM - knee flexion x 10 (R)   Ankle pumps: x 20 (R)   Patient education: incision hygiene, showering/bathing procedures, expectations and timeline for recovery, medication use/recommendations  Knee immobilizer adjustment: x 4min  Administered HEP: Reviewed HEP handout, exercise selection, and recommended resistance. Provided verbal and written instructions/cueing for proper technique and common errors/compensations as needed.   Patient Education: Patient was educated on the importance of compliance with consistent treatment and HEP to achieve mutually established goals. Patient verbalized understanding.     Neuromuscular Re-education: x 5min  Quad set: 1 x 20 x 3\" (R)   SLR with quad set: 2 x 5 (R) with PT assist     Manual Therapy: x 10min  Soft tissue mobilization: (R) quad, ITB/VL   Edema massage: (R) knee     Home Exercise Program: Patient was issued a HEP handout 6/26/2024 including quad  set and ankle pumps    Provider Interactions With Patient:   All patient's questions were answered and the patient denied further comments, complaints, or concerns upon departure.   Patient was issued an appt list and verbally confirmed the next appt date and time to ensure consistency with physical therapy attendance.     Charges: PT Eval Low Complexity Complexity x 1, MT x 1, Therex x 1  Total Timed Treatment: 25min       Total Treatment Time: 45min     PLAN OF CARE:      Goals:  Short-Term Goals:  Patient will demonstrates knee flexion AROM to 90deg pain-free to facilitate sitting comfortably for 2 hours daily for sitting in a chair or at a desk. Timeframe: 4-6 visits.  Patient will improve knee flexion AROM to 120deg to allow for reciprocal stair navigation pattern for household ambulation. Timeframe: 6-8 visits.  Patient will improve knee strength and stability to facilitate discontinued use of AD for daily ambulation. Timeframe: 8-10 visits.   The patient will improve quad strength and single-leg stance stability to demonstrate non-antalgic gait pattern with walking short-length distances for household ambulation. Timeframe: 10-12 visits.  The patient will improve LE strength and endurance to facilitate standing for extended periods of time for 2-3 hours daily for household and community ambulation. Timeframe: 10-12 visits.  Long-Term Goals:  The patient will improve LE strength and endurance to facilitate walking distances of 1-2 mile(s) daily for household and community ambulation. Timeframe: 14-16 visits.  Patient will improve lower motor control to perform double-leg squat with symmetrical loading, without asymmetries, and with proper posterior chain loading to facilitate squatting and lifting ADL's. Timeframe: 16 visits.  Patient will improve LE mobility, strength, and single-leg stabilization to meet strength requirements for reciprocal stair navigation pattern with no asymmetries for ascending and  descending 5 flights of stairs daily for community integration. Timeframe: 20 visits.  Patient will improve dynamic LE strength and stabilization to facilitate running intermittent short distances for return to general health and wellness and for safety with crossing the street quickly, if needed. Timeframe:  24 visits.  Patient will improve lower quarter motor control to perform a single-leg squat on involved side with proper posterior chain loading and adequate trunk, hip and knee stabilization to facilitate normalized strength for safe return to sports participation. Timeframe: 28-30 visits.  Patient will improve LEFS score by at least 9 points to indicate a true change in improved function for ADL's and restoring PLF. Timeframe: 28-30 visits.    Plan Frequency / Duration: Patient will be seen for 2x/week for 6 weeks, for a total of 12 visits, over a 90 day period. We will re-evaluate the patient at that time in order to determine functional progress, evaluate short-term goal completion, and establish an updated plan of care. Possible treatment interventions will/may include: Therapeutic Activities, Therapeutic Exercise, Neuromuscular Re-education, Manual Therapy, Home Exercise Program Instruction, Patient Education, Self-Care/Home Management, Gait Training, and Modalities as needed.     Patient/Family was advised of these findings, precautions, and treatment options and has agreed to actively participate in planning and for this course of care.    Thank you for your referral. Please co-sign or sign and return this letter via fax as soon as possible to [phone]. If you have any questions, please contact me at Dept: [phone].    Sincerely,    X___Gia Ohara PT, DPT, SCS, ATC, CSCS____ Date: __6/26/2024________    Electronically signed by therapist: Gia Kay PT  Physician's certification required: Yes  I certify the need for these services furnished under this plan of treatment and while under my  care.

## 2024-06-28 ENCOUNTER — OFFICE VISIT (OUTPATIENT)
Dept: PHYSICAL THERAPY | Age: 19
End: 2024-06-28
Attending: ORTHOPAEDIC SURGERY
Payer: COMMERCIAL

## 2024-06-28 PROCEDURE — 97140 MANUAL THERAPY 1/> REGIONS: CPT

## 2024-06-28 PROCEDURE — 97110 THERAPEUTIC EXERCISES: CPT

## 2024-06-28 NOTE — PATIENT INSTRUCTIONS
Thank you for coming to your physical therapy appt! During your appt today you were issued a HEP handout from [employer] which can also be accessed using the information below. The exercises chosen are meant to supplement the treatment you received and reinforce the progress made in physical therapy. It is important to stay consistent with your exercises to help facilitate and maximize your recovery!      View at [email or web address] using code: [email or web address]

## 2024-06-28 NOTE — PROGRESS NOTES
Date of Service: 6/28/2024  Dx: Rupture of posterior cruciate ligament of right knee, subsequent encounter (S83.521D)          DOS: 6/25/24      Insurance: [facility]  Insurance Limits: auth required  Visit #: 2  Authorized # of Visits: 12 authorized  POC/Auth Expiration: 9/24/24  Date of Last PN: 6/26/24 (Visit #1)  Authorizing Physician/Provider: Ben Diaz MD visit: 7/1/24  Fall Risk: Standard         Precautions: None            Subjective: The patient reports that she has been feeling a bit queasy. She has been taking     Objective:     Pain/Symptom Presentation:    Pain at rest: 1/10  Pain at worst: 5-6/10    ROM:   Knee Motion PROM AROM    Right Left Right Left   Knee Extension 0 0 0 0   Knee Flexion 102 140 95 135   *indicates activity was associated with pain    HEP: Patient was issued a HEP handout 6/26/2024 including quad set and ankle pumps    Treatment:  Therapeutic Activities: x ***min  ***    Therapeutic Exercise: x 12min  PROM - knee flexion x 10 (R)   Ankle pumps: x 20 (R)     Neuromuscular Re-education: x 5min  Quad set: 1 x 20 x 3\" (R)   SLR with quad set: 2 x 5 (R) with PT assist     Manual Therapy: x 10min  Soft tissue mobilization: (R) quad, ITB/VL   Edema massage: (R) knee     Provider Interactions With Patient:   ***  Verbally confirmed the patient's next appt date and time to ensure consistency with physical therapy attendance.     Assessment: ***    Goals:   Short-Term Goals:  Patient will demonstrates knee flexion AROM to 90deg pain-free to facilitate sitting comfortably for 2 hours daily for sitting in a chair or at a desk. Timeframe: 4-6 visits.  Patient will improve knee flexion AROM to 120deg to allow for reciprocal stair navigation pattern for household ambulation. Timeframe: 6-8 visits.  Patient will improve knee strength and stability to facilitate discontinued use of AD for daily ambulation. Timeframe: 8-10 visits.   The patient will improve quad strength and  single-leg stance stability to demonstrate non-antalgic gait pattern with walking short-length distances for household ambulation. Timeframe: 10-12 visits.  The patient will improve LE strength and endurance to facilitate standing for extended periods of time for 2-3 hours daily for household and community ambulation. Timeframe: 10-12 visits.  Long-Term Goals:  The patient will improve LE strength and endurance to facilitate walking distances of 1-2 mile(s) daily for household and community ambulation. Timeframe: 14-16 visits.  Patient will improve lower motor control to perform double-leg squat with symmetrical loading, without asymmetries, and with proper posterior chain loading to facilitate squatting and lifting ADL's. Timeframe: 16 visits.  Patient will improve LE mobility, strength, and single-leg stabilization to meet strength requirements for reciprocal stair navigation pattern with no asymmetries for ascending and descending 5 flights of stairs daily for community integration. Timeframe: 20 visits.  Patient will improve dynamic LE strength and stabilization to facilitate running intermittent short distances for return to general health and wellness and for safety with crossing the street quickly, if needed. Timeframe:  24 visits.  Patient will improve lower quarter motor control to perform a single-leg squat on involved side with proper posterior chain loading and adequate trunk, hip and knee stabilization to facilitate normalized strength for safe return to sports participation. Timeframe: 28-30 visits.  Patient will improve LEFS score by at least 9 points to indicate a true change in improved function for ADL's and restoring PLF. Timeframe: 28-30 visits.    Plan: ***      Charges: ***         Total Timed Treatment: ***min    Total Treatment Time: ***min   integration. Timeframe: 20 visits.  Patient will improve dynamic LE strength and stabilization to facilitate running intermittent short distances for return to general health and wellness and for safety with crossing the street quickly, if needed. Timeframe:  24 visits.  Patient will improve lower quarter motor control to perform a single-leg squat on involved side with proper posterior chain loading and adequate trunk, hip and knee stabilization to facilitate normalized strength for safe return to sports participation. Timeframe: 28-30 visits.  Patient will improve LEFS score by at least 9 points to indicate a true change in improved function for ADL's and restoring PLF. Timeframe: 28-30 visits.    Plan: Continue to progress knee mobility and quad activation as tolerated.       Charges: MT x 1, Therex x 2         Total Timed Treatment: 40min    Total Treatment Time: 40min

## 2024-07-01 ENCOUNTER — OFFICE VISIT (OUTPATIENT)
Dept: ORTHOPEDICS CLINIC | Facility: CLINIC | Age: 19
End: 2024-07-01
Payer: COMMERCIAL

## 2024-07-01 VITALS — HEIGHT: 66 IN | BODY MASS INDEX: 21.53 KG/M2 | WEIGHT: 134 LBS

## 2024-07-01 DIAGNOSIS — Z98.890 S/P RIGHT KNEE SURGERY: Primary | ICD-10-CM

## 2024-07-01 PROCEDURE — 3008F BODY MASS INDEX DOCD: CPT | Performed by: PHYSICIAN ASSISTANT

## 2024-07-01 PROCEDURE — 99024 POSTOP FOLLOW-UP VISIT: CPT | Performed by: PHYSICIAN ASSISTANT

## 2024-07-01 NOTE — PROGRESS NOTES
[facility]  [address] [place] [id number]  [phone]       Name: Arianna Brown   MRN: [id number]  Date: 7/1/2024     REASON FOR VISIT: First Post-Surgical Visit   Surgery: Right knee PCL reconstruction with Achilles tendon allograft, major synovectomy on 06/25/2024.     INTERVAL HISTORY:  Arianna Brown is a 18 year old female who returns after the aforementioned procedure.  The post-operative course has been unremarkable with pain well controlled and overall progress noted.     Physical therapy was started and is progressing well.  The patient denies any calf pain or tenderness, fevers, chills, sweats or signs of active infection. The patient has been compliant with the postoperative protocol, and admits to normal bowel and bladder function. No acute issues.     ROS: ROS    PE:   Vitals:    07/01/24 1102   Weight: 134 lb (60.8 kg)   Height: 5' 6\" (1.676 m)     Estimated body mass index is 21.63 kg/m² as calculated from the following:    Height as of this encounter: 5' 6\" (1.676 m).    Weight as of this encounter: 134 lb (60.8 kg).    Physical Exam  Constitutional:       Appearance: Normal appearance.   HENT:      Head: Normocephalic and atraumatic.   Eyes:      Extraocular Movements: Extraocular movements intact.   Neck:      Musculoskeletal: Normal range of motion and neck supple.   Cardiovascular:      Pulses: Normal pulses.   Pulmonary:      Effort: Pulmonary effort is normal. No respiratory distress.   Abdominal:      General: There is no distension.   Skin:     General: Skin is warm.      Capillary Refill: Capillary refill takes less than 2 seconds.      Findings: No bruising.   Neurological:      General: No focal deficit present.      Mental Status: She is alert.   Psychiatric:         Mood and Affect: Mood normal.     Examination of the right knee demonstrates:     Physical examination the patient is alert and oriented x3, well-developed, well-nourished, no  acute distress.     Tegaderm dressings were removed, and Steri-Strips were maintained and kept in place. 0-90.   Incisional sites are clean dry intact without signs of active pathology.  Calf is soft and nontender to palpation.    The contralateral knee is without limitation in range of motion or strength, no positive provocative maneuvers.       IMPRESSION: Arianna Brown is a 18 year old female who presents 6 days s/p Right knee PCL reconstruction with Achilles tendon allograft, major synovectomy on 06/25/2024.     PLAN:   We had a lengthy discussion with the patient regarding the patient's findings consistent with the expected postoperative course. We recommend continuation of physical therapy with rehabilitation efforts focused on strengthening, range of motion, functional ability, and return to baseline activity. The patient can continue to progress per protocol.    We provided a new hinged knee brace.   All questions were answered appropriately and the patient was in agreement with the treatment plan.       FOLLOW-UP:  Return to clinic in four weeks. No imaging required at next visit.             Sincer VALENCIA Covarrubias [facility], PA-C Orthopedic Surgery / Sports Medicine Specialist  [facility]  [address] [place] [address].org  [email or web address]  t: [phone]  o: [phone]  f: [phone]    This note was dictated using Dragon software.  While it was briefly proofread prior to completion, some grammatical, spelling, and word choice errors due to dictation may still occur.

## 2024-07-02 ENCOUNTER — OFFICE VISIT (OUTPATIENT)
Dept: PHYSICAL THERAPY | Age: 19
End: 2024-07-02
Attending: ORTHOPAEDIC SURGERY
Payer: COMMERCIAL

## 2024-07-02 PROCEDURE — 97140 MANUAL THERAPY 1/> REGIONS: CPT

## 2024-07-02 PROCEDURE — 97110 THERAPEUTIC EXERCISES: CPT

## 2024-07-02 NOTE — PATIENT INSTRUCTIONS
Thank you for coming to your physical therapy appt! During your appt today you were issued a HEP handout from [employer] which can also be accessed using the information below. The exercises chosen are meant to supplement the treatment you received and reinforce the progress made in physical therapy. It is important to stay consistent with your exercises to help facilitate and maximize your recovery!      View at [email or web address] using code: [email or web address]

## 2024-07-02 NOTE — PROGRESS NOTES
Date of Service: 7/2/2024  Dx: Rupture of posterior cruciate ligament of right knee, subsequent encounter (S83.521D)          DOS: 6/25/24      Insurance: [facility]  Insurance Limits: auth required  Visit #: 3  Authorized # of Visits: 12 authorized  POC/Auth Expiration: 9/24/24  Date of Last PN: 6/26/24 (Visit #1)  Authorizing Physician/Provider: Ben Diaz MD visit: 7/1/24  Fall Risk: Standard         Precautions: None            Subjective: The patient report that she is not as queasy. She reports compliance with her HEP. She has been putting weight through her leg without increased pain.     Objective:     Pain/Symptom Presentation:    Pain at rest: 0/10  Pain at worst: 2/10    ROM:   Knee Motion PROM AROM    Right Left Right Left   Knee Extension 0 0 0 0   Knee Flexion 120 140 114 135   *indicates activity was associated with pain    HEP: Patient was issued a HEP handout 7/2/24 including SLR, to be added to HEP issued 6/28/24 including heel slide with strap, S/L hip ABD, and DL bridging, to be added to HEP issued 6/26/2024 including quad set and ankle pumps.     Treatment:    Therapeutic Exercise: x 20min  PROM - knee extension with overpressure: x 10 (R)   PROM - knee flexion x 10 (R)   Heel slide with strap: x 10 (R)   LAQ: x 20 (R)   Seated TKE into SB: 2 x 10 (R)   S/L hip ABD: 2 x 10 (R)   DL bridging: 2 x 10     Neuromuscular Re-education: x 5min  Quad set: 1 x 20 x 3\" (R)   SLR with quad set: 2 x 10 (R), no assist, minimal quad lag     Manual Therapy: x 15min  Soft tissue mobilization: (R) quad, ITB/VL   Edema massage: (R) knee   Sup/inf patellar joint mobs: Grade 3, 4 x 10\" (R)     Provider Interactions With Patient:   Educated patient on quad/extensor lag to facilitate improved quad activation.   Verbally confirmed the patient's next appt date and time to ensure consistency with physical therapy attendance.     Assessment: Arianna arrives today after follow-up appt with her MD who is  pleased with her progress. The patient was able to reach 120deg knee flexion PROM this date. She demonstrates improved quad strength with only a minimal quad/extensor lag. The patient was educated on the importance of quad control at TKE to facilitate WB activities and to unlock her knee brace for daily ambulation. The patient was issued a HEP including SLR to facilitate additional quad strength. We will assess quad control and strength at next session to determine readiness to unlock brace for daily ambulation.     Goals:   Short-Term Goals:  Patient will demonstrates knee flexion AROM to 90deg pain-free to facilitate sitting comfortably for 2 hours daily for sitting in a chair or at a desk. Timeframe: 4-6 visits.  Patient will improve knee flexion AROM to 120deg to allow for reciprocal stair navigation pattern for household ambulation. Timeframe: 6-8 visits.  Patient will improve knee strength and stability to facilitate discontinued use of AD for daily ambulation. Timeframe: 8-10 visits.   The patient will improve quad strength and single-leg stance stability to demonstrate non-antalgic gait pattern with walking short-length distances for household ambulation. Timeframe: 10-12 visits.  The patient will improve LE strength and endurance to facilitate standing for extended periods of time for 2-3 hours daily for household and community ambulation. Timeframe: 10-12 visits.  Long-Term Goals:  The patient will improve LE strength and endurance to facilitate walking distances of 1-2 mile(s) daily for household and community ambulation. Timeframe: 14-16 visits.  Patient will improve lower motor control to perform double-leg squat with symmetrical loading, without asymmetries, and with proper posterior chain loading to facilitate squatting and lifting ADL's. Timeframe: 16 visits.  Patient will improve LE mobility, strength, and single-leg stabilization to meet strength requirements for reciprocal stair navigation pattern  with no asymmetries for ascending and descending 5 flights of stairs daily for community integration. Timeframe: 20 visits.  Patient will improve dynamic LE strength and stabilization to facilitate running intermittent short distances for return to general health and wellness and for safety with crossing the street quickly, if needed. Timeframe:  24 visits.  Patient will improve lower quarter motor control to perform a single-leg squat on involved side with proper posterior chain loading and adequate trunk, hip and knee stabilization to facilitate normalized strength for safe return to sports participation. Timeframe: 28-30 visits.  Patient will improve LEFS score by at least 9 points to indicate a true change in improved function for ADL's and restoring PLF. Timeframe: 28-30 visits.    Plan: Re-assess quad control at TKE with SLR.       Charges: MT x 1, Therex x 2         Total Timed Treatment: 40min    Total Treatment Time: 40min

## 2024-07-05 ENCOUNTER — OFFICE VISIT (OUTPATIENT)
Dept: NEUROLOGY | Facility: CLINIC | Age: 19
End: 2024-07-05
Payer: COMMERCIAL

## 2024-07-05 ENCOUNTER — APPOINTMENT (OUTPATIENT)
Dept: PHYSICAL THERAPY | Age: 19
End: 2024-07-05
Attending: ORTHOPAEDIC SURGERY
Payer: COMMERCIAL

## 2024-07-05 ENCOUNTER — TELEPHONE (OUTPATIENT)
Dept: NEUROLOGY | Facility: CLINIC | Age: 19
End: 2024-07-05

## 2024-07-05 VITALS
WEIGHT: 134 LBS | DIASTOLIC BLOOD PRESSURE: 68 MMHG | RESPIRATION RATE: 16 BRPM | HEIGHT: 66 IN | BODY MASS INDEX: 21.53 KG/M2 | HEART RATE: 88 BPM | SYSTOLIC BLOOD PRESSURE: 102 MMHG

## 2024-07-05 DIAGNOSIS — G43.709 CHRONIC MIGRAINE W/O AURA W/O STATUS MIGRAINOSUS, NOT INTRACTABLE: Primary | ICD-10-CM

## 2024-07-05 PROCEDURE — 3078F DIAST BP <80 MM HG: CPT | Performed by: OTHER

## 2024-07-05 PROCEDURE — 3074F SYST BP LT 130 MM HG: CPT | Performed by: OTHER

## 2024-07-05 PROCEDURE — 99215 OFFICE O/P EST HI 40 MIN: CPT | Performed by: OTHER

## 2024-07-05 PROCEDURE — 3008F BODY MASS INDEX DOCD: CPT | Performed by: OTHER

## 2024-07-05 RX ORDER — TOPIRAMATE 25 MG/1
TABLET ORAL
Qty: 120 TABLET | Refills: 2 | Status: SHIPPED | OUTPATIENT
Start: 2024-07-05

## 2024-07-05 RX ORDER — UBROGEPANT 50 MG/1
TABLET ORAL
Qty: 3 TABLET | Refills: 0 | Status: SHIPPED | COMMUNITY
Start: 2024-07-05 | End: 2025-07-05

## 2024-07-05 NOTE — PATIENT INSTRUCTIONS
Refill policies:    Allow 2-3 business days for refills; controlled substances may take longer.  Contact your pharmacy at least 5 days prior to running out of medication and have them send an electronic request or submit request through the “request refill” option in your [employer] account.  Refills are not addressed on weekends; covering physicians do not authorize routine medications on weekends.  No narcotics or controlled substances are refilled after noon on Fridays or by on call physicians.  By law, narcotics must be electronically prescribed.  A 30 day supply with no refills is the maximum allowed.  If your prescription is due for a refill, you may be due for a follow up appointment.  To best provide you care, patients receiving routine medications need to be seen at least once a year.  Patients receiving narcotic/controlled substance medications need to be seen at least once every 3 months.  In the event that your preferred pharmacy does not have the requested medication in stock (e.g. Backordered), it is your responsibility to find another pharmacy that has the requested medication available.  We will gladly send a new prescription to that pharmacy at your request.    Scheduling Tests:    If your physician has ordered radiology tests such as MRI or CT scans, please contact Central Scheduling at [phone] right away to schedule the test.  Once scheduled, the [facility] Centralized Referral Team will work with your insurance carrier to obtain pre-certification or prior authorization.  Depending on your insurance carrier, approval may take 3-10 days.  It is highly recommended patients assure they have received an authorization before having a test performed.  If test is done without insurance authorization, patient may be responsible for the entire amount billed.      Precertification and Prior Authorizations:  If your physician has recommended that you have a procedure or additional testing performed the [facility]  Centralized Referral Team will contact your insurance carrier to obtain pre-certification or prior authorization.    You are strongly encouraged to contact your insurance carrier to verify that your procedure/test has been approved and is a COVERED benefit.  Although the Counts include 234 beds at the [facility] Centralized Referral Team does its due diligence, the insurance carrier gives the disclaimer that \"Although the procedure is authorized, this does not guarantee payment.\"    Ultimately the patient is responsible for payment.   Thank you for your understanding in this matter.  Paperwork Completion:  If you require FMLA or disability paperwork for your recovery, please make sure to either drop it off or have it faxed to our office at [phone]. Be sure the form has your name and date of birth on it.  The form will be faxed to our Forms Department and they will complete it for you.  There is a 25$ fee for all forms that need to be filled out.  Please be aware there is a 10-14 day turnaround time.  You will need to sign a release of information (DONTE) form if your paperwork does not come with one.  You may call the Forms Department with any questions at [phone].  Their fax number is [phone].

## 2024-07-05 NOTE — PROGRESS NOTES
[facility] Neurology Outpatient Progress Note  Date of service: 7/5/2024    Assessment:     ICD-10-CM    1. Chronic migraine w/o aura w/o status migrainosus, not intractable  G43.709 ubrogepant (UBRELVY) 50 MG Oral Tab      Worse, needs preventive meds    Plan:  Topamax trial (unable to give antihypertensive due to already low BP, already on one antidepressant)  Imitrex , ubrelvy prn (states fioricet not helping)   See orders and medications filed with this encounter. The patient indicates understanding of these issues and agrees with the plan.  Discussed with patient/family regarding assessment, work up, care plan   RTC 4 months, other options; anti CGRP mAbs/Maciel, botox    Subjective:   History:  Patient here for a follow-up visit for migraine. Since last visit headache has been almost daily, sometimes feels different pressure type headache. T  She is willing to try preventive medication.  She has right leg surgery and is wearing a brace device now  MRI brain , EEG were unremarkable.  Her mother is here with pt.    History/Other:   REVIEW OF SYSTEMS:  A 10-point system was reviewed. Pertinent positives and negatives are noted as above       Current Outpatient Medications:     ubrogepant (UBRELVY) 50 MG Oral Tab, Take one tablet at onset of migraine.  May take additional tablet in 2 hours if needed.  Do not exceed two tablets per 24 hour period., Disp: 3 tablet, Rfl: 0    Acetaminophen 500 MG Oral Cap, Take 2 capsules (1,000 mg total) by mouth every 6 (six) hours for 14 days., Disp: 50 capsule, Rfl: 1    ondansetron (ZOFRAN) 4 mg tablet, Take 1 tablet by mouth every 8 hours as needed for nausea., Disp: 8 tablet, Rfl: 0    sertraline 50 MG Oral Tab, Take 1.5 tablets (75 mg total) by mouth daily., Disp: , Rfl:     topiramate 25 MG Oral Tab, Take 1 tab bid po x 1 week, then 2 tabs bid po thereafter, Disp: 120 tablet, Rfl: 2  Allergies:  Allergies   Allergen Reactions    Reglan [Metoclopramide] RESTLESSNESS     Past Medical  History:    Anxiety    Attention deficit hyperactivity disorder (ADHD)    not on any medications    Autism spectrum disorder (HCC)    pt states she was thought to be on  spectrum when she was school age- does not feel that is correct    Back problem    Depression    Hx of motion sickness    Migraines    Muscle weakness    bilateral hands    Seizure disorder (HCC)    twice in 5th grade- none since then     Past Surgical History:   Procedure Laterality Date    Endoscopy of bowel pouch      Knee surgery Right 06/25/2024    PCL repair     Social History:  Social History     Tobacco Use    Smoking status: Never     Passive exposure: Never    Smokeless tobacco: Never    Tobacco comments:     non-smoker   Substance Use Topics    Alcohol use: Never     Family History   Problem Relation Age of Onset    Breast Cancer Maternal Grandmother     Breast Cancer Maternal Aunt     Breast Cancer Maternal Great-Grandmother      Objective:   Neurological Examination:  /68 (BP Location: Left arm, Patient Position: Sitting, Cuff Size: adult)   Pulse 88   Resp 16   Ht 66\"   Wt 134 lb (60.8 kg)   LMP 07/03/2024 (Approximate)   BMI 21.63 kg/m²   Mental status: A & O X 3  Language: no aphasia  Speech: no dysarthria  CN II-XII: intact   Motor strength:right LE limited  Coordination: normal  Sensory: symmetric  Gait: deferred    Test reviewed on 7/5/2024      Yoni \"Charanjit\" MD [place]  Neurology  [facility]  7/5/2024, 2:26 PM  CC: Kelley Dela Cruz MD

## 2024-07-05 NOTE — TELEPHONE ENCOUNTER
Notification received in Epic for Ubrelvy:    Approval Details    Authorized from June 5, 2024 to July 5, 2025  Information received electronically from payer      Rx has not yet been sent to pharmacy  but on file in [employer] if needed.

## 2024-07-08 ENCOUNTER — TELEPHONE (OUTPATIENT)
Dept: PHYSICAL THERAPY | Age: 19
End: 2024-07-08

## 2024-07-09 ENCOUNTER — OFFICE VISIT (OUTPATIENT)
Dept: PHYSICAL THERAPY | Age: 19
End: 2024-07-09
Attending: ORTHOPAEDIC SURGERY
Payer: COMMERCIAL

## 2024-07-09 PROCEDURE — 97140 MANUAL THERAPY 1/> REGIONS: CPT

## 2024-07-09 PROCEDURE — 97110 THERAPEUTIC EXERCISES: CPT

## 2024-07-09 NOTE — PROGRESS NOTES
Date of Service: 7/9/2024  Dx: Rupture of posterior cruciate ligament of right knee, subsequent encounter (S83.521D)          DOS: 6/25/24      Insurance: [facility]  Insurance Limits: auth required  Visit #: 4  Authorized # of Visits: 12 authorized  POC/Auth Expiration: 9/24/24  Date of Last PN: 6/26/24 (Visit #1)  Authorizing Physician/Provider: Ben Diaz MD visit: 7/1/24  Fall Risk: Standard         Precautions: None            Subjective: The patient that her knee would feel tight and a slight pain when standing. She feels a blood rush to her knee when she stands, but it's only temporary. She reports that she will occasionally walk around the house without her crutches, which she is able to do without pain.     Objective:     Pain/Symptom Presentation:    Pain at rest: 0/10  Pain at worst: 3/10    ROM:   Knee Motion PROM AROM    Right Left Right Left   Knee Extension 0 0 0 0   Knee Flexion 130 140 125 135   *indicates activity was associated with pain    HEP: Patient was issued a HEP handout 7/9/24 including SLR, DL bridging, LAQ, standing hip ABD and ext, and elastic band TKE.     Treatment:    Therapeutic Exercise: x 25min  PROM - knee extension with overpressure: x 10 (R)   PROM - knee flexion x 10 (R)   Heel slide with strap: x 10 (R)  DL bridging: 2 x 10    LAQ: x 20 (R)   Standing TKE: 2 x 10 (R), green theraband   Standing hip ABD: 2 x 10 (B)   Standing hip ext: 2 x 10 (B)   DLHR: x 20   HEP update: Reviewed new HEP handout with new exercises and progressions, provided verbal and written instructions/cueing for proper technique and common errors/compensations as needed. Reviewed the importance of compliance with home exercise program to facilitate recovery and meet long-term goals.      Neuromuscular Re-education: x 5min  Quad set: 1 x 20 x 3\" (R)   SLR with quad set: 2 x 10 (R)     Manual Therapy: x 10min  Soft tissue mobilization: (R) quad, ITB/VL   Edema massage: (R) knee   Sup/inf  patellar joint mobs: Grade 3, 4 x 10\" (R)     Provider Interactions With Patient:   Educated patient on quad/extensor lag to facilitate improved quad activation.   Verbally confirmed the patient's next appt date and time to ensure consistency with physical therapy attendance.     Assessment: Arianna was able to reach 130deg knee flexion PROM this date. The patient was instructed to reduce AD support to once crutch, decreasing to no AD if no adverse reaction over the next two days. If the patient is tolerating no AD well, we will assess for readiness to unlock her knee brace. The patient was issued an updated HEP handout to include additional strengthening exercises in WB. The patient would benefit from continued PT for further progression in strength and stability for normal ambulation.      Goals:   Short-Term Goals:  Patient will demonstrates knee flexion AROM to 90deg pain-free to facilitate sitting comfortably for 2 hours daily for sitting in a chair or at a desk. Timeframe: 4-6 visits.  Patient will improve knee flexion AROM to 120deg to allow for reciprocal stair navigation pattern for household ambulation. Timeframe: 6-8 visits.  Patient will improve knee strength and stability to facilitate discontinued use of AD for daily ambulation. Timeframe: 8-10 visits.   The patient will improve quad strength and single-leg stance stability to demonstrate non-antalgic gait pattern with walking short-length distances for household ambulation. Timeframe: 10-12 visits.  The patient will improve LE strength and endurance to facilitate standing for extended periods of time for 2-3 hours daily for household and community ambulation. Timeframe: 10-12 visits.  Long-Term Goals:  The patient will improve LE strength and endurance to facilitate walking distances of 1-2 mile(s) daily for household and community ambulation. Timeframe: 14-16 visits.  Patient will improve lower motor control to perform double-leg squat with symmetrical  loading, without asymmetries, and with proper posterior chain loading to facilitate squatting and lifting ADL's. Timeframe: 16 visits.  Patient will improve LE mobility, strength, and single-leg stabilization to meet strength requirements for reciprocal stair navigation pattern with no asymmetries for ascending and descending 5 flights of stairs daily for community integration. Timeframe: 20 visits.  Patient will improve dynamic LE strength and stabilization to facilitate running intermittent short distances for return to general health and wellness and for safety with crossing the street quickly, if needed. Timeframe:  24 visits.  Patient will improve lower quarter motor control to perform a single-leg squat on involved side with proper posterior chain loading and adequate trunk, hip and knee stabilization to facilitate normalized strength for safe return to sports participation. Timeframe: 28-30 visits.  Patient will improve LEFS score by at least 9 points to indicate a true change in improved function for ADL's and restoring PLF. Timeframe: 28-30 visits.    Plan: Follow-up on tolerance to discontinued crutch use. Assess for readiness for unlocking brace based on quad strength and TKE control.       Charges: MT x 1, Therex x 2         Total Timed Treatment: 40min    Total Treatment Time: 40min

## 2024-07-11 ENCOUNTER — OFFICE VISIT (OUTPATIENT)
Dept: PHYSICAL THERAPY | Age: 19
End: 2024-07-11
Attending: ORTHOPAEDIC SURGERY
Payer: COMMERCIAL

## 2024-07-11 PROCEDURE — 97110 THERAPEUTIC EXERCISES: CPT

## 2024-07-11 PROCEDURE — 97140 MANUAL THERAPY 1/> REGIONS: CPT

## 2024-07-11 NOTE — PROGRESS NOTES
Date of Service: 7/11/2024  Dx: Rupture of posterior cruciate ligament of right knee, subsequent encounter (S83.521D)          DOS: 6/25/24      Insurance: [facility]  Insurance Limits: auth required  Visit #: 5  Authorized # of Visits: 12 authorized  POC/Auth Expiration: 9/24/24  Date of Last PN: 6/26/24 (Visit #1)  Authorizing Physician/Provider: Ben Diaz MD visit: 7/1/24  Fall Risk: Standard         Precautions: None            Subjective: The patient that her knee would feel tight and a slight pain when standing. She feels a blood rush to her knee when she stands, but it's only temporary. She reports that she will occasionally walk around the house without her crutches, which she is able to do without pain.     Objective:     Pain/Symptom Presentation:    Pain at rest: 0/10  Pain at worst: 3/10    ROM:   Knee Motion PROM AROM    Right Left Right Left   Knee Extension 0 0 0 0   Knee Flexion 130 140 125 135   *indicates activity was associated with pain    HEP: Patient was issued a HEP handout 7/9/24 including SLR, DL bridging, LAQ, standing hip ABD and ext, and elastic band TKE.     Treatment:    Therapeutic Activities: x 5min  DL squat: 1 x 10   Step-up: 2 x 10 (R), 4\" step   Shuttle DLP: x 20, 4 cords     Therapeutic Exercise: x 20min  PROM - knee extension with overpressure: x 5 (R)   PROM - knee flexion x 5 (R)   DL bridging: 2 x 10    LAQ: x 20 (R)   Standing TKE: 2 x 10 (R), green theraband   Standing hip ABD: 1 x 20 (B)   Standing hip ext: 1 x 20 (B)   Standing HSC: x 20 (B)   DLHR: x 20     Neuromuscular Re-education: x 5min  Quad set: 1 x 20 x 3\" (R)   SLR with quad set: 2 x 10 (R) - cues to avoid femoral IR     Manual Therapy: x 10min  Soft tissue mobilization: (R) quad, ITB/VL   Edema massage: (R) knee   Sup/inf patellar joint mobs: Grade 3, 4 x 10\" (R)     Provider Interactions With Patient:   Educated patient on quad/extensor lag to facilitate improved quad activation.    Verbally confirmed the patient's next appt date and time to ensure consistency with physical therapy attendance.     Assessment: Arianna arrived today still using one crutch for ambulation. The patient reports that towards the end of the day she will have some increased pain which she was educated that is due to remaining endurance deficits. The patient progressed LE strengthening exercises this date, including navigating stairs with her involved leg. The patient would benefit from continued PT for further progression in LE strengthening for normal ambulation.     Goals:   Short-Term Goals:  Patient will demonstrates knee flexion AROM to 90deg pain-free to facilitate sitting comfortably for 2 hours daily for sitting in a chair or at a desk. Timeframe: 4-6 visits.  Patient will improve knee flexion AROM to 120deg to allow for reciprocal stair navigation pattern for household ambulation. Timeframe: 6-8 visits.  Patient will improve knee strength and stability to facilitate discontinued use of AD for daily ambulation. Timeframe: 8-10 visits.   The patient will improve quad strength and single-leg stance stability to demonstrate non-antalgic gait pattern with walking short-length distances for household ambulation. Timeframe: 10-12 visits.  The patient will improve LE strength and endurance to facilitate standing for extended periods of time for 2-3 hours daily for household and community ambulation. Timeframe: 10-12 visits.  Long-Term Goals:  The patient will improve LE strength and endurance to facilitate walking distances of 1-2 mile(s) daily for household and community ambulation. Timeframe: 14-16 visits.  Patient will improve lower motor control to perform double-leg squat with symmetrical loading, without asymmetries, and with proper posterior chain loading to facilitate squatting and lifting ADL's. Timeframe: 16 visits.  Patient will improve LE mobility, strength, and single-leg stabilization to meet strength  requirements for reciprocal stair navigation pattern with no asymmetries for ascending and descending 5 flights of stairs daily for community integration. Timeframe: 20 visits.  Patient will improve dynamic LE strength and stabilization to facilitate running intermittent short distances for return to general health and wellness and for safety with crossing the street quickly, if needed. Timeframe:  24 visits.  Patient will improve lower quarter motor control to perform a single-leg squat on involved side with proper posterior chain loading and adequate trunk, hip and knee stabilization to facilitate normalized strength for safe return to sports participation. Timeframe: 28-30 visits.  Patient will improve LEFS score by at least 9 points to indicate a true change in improved function for ADL's and restoring PLF. Timeframe: 28-30 visits.    Plan: Follow-up on tolerance to discontinued crutch use.       Charges: MT x 1, Therex x 2         Total Timed Treatment: 40min    Total Treatment Time: 40min

## 2024-07-16 ENCOUNTER — OFFICE VISIT (OUTPATIENT)
Dept: PHYSICAL THERAPY | Age: 19
End: 2024-07-16
Attending: ORTHOPAEDIC SURGERY
Payer: COMMERCIAL

## 2024-07-16 PROCEDURE — 97140 MANUAL THERAPY 1/> REGIONS: CPT

## 2024-07-16 PROCEDURE — 97110 THERAPEUTIC EXERCISES: CPT

## 2024-07-16 NOTE — PROGRESS NOTES
Date of Service: 7/16/2024  Dx: Rupture of posterior cruciate ligament of right knee, subsequent encounter (S83.521D)          DOS: 6/25/24      Insurance: [facility]  Insurance Limits: auth required  Visit #: 6  Authorized # of Visits: 12 authorized  POC/Auth Expiration: 9/24/24  Date of Last PN: 6/26/24 (Visit #1)  Authorizing Physician/Provider: Ben Diaz MD visit: 7/1/24  Fall Risk: Standard         Precautions: None            Subjective: Arianna reports that she stopped using the crutches over the weekend. She reports on occasional catching in her knee when she bends it, where she has to straighten it back out and try bending it again.     Objective:     Pain/Symptom Presentation:    Pain at rest: 0/10  Pain at worst: 3/10    ROM:   Knee Motion PROM AROM    Right Left Right Left   Knee Extension 0 0 0 0   Knee Flexion 130 140 125 135   *indicates activity was associated with pain    HEP: Patient was issued a HEP handout 7/9/24 including SLR, DL bridging, LAQ, standing hip ABD and ext, and elastic band TKE.     Treatment:    Therapeutic Activities: x 10min  DL squat: 1 x 10 - mild/mod left heath shift   Step-up: 1 x 10 (R), 6\" step   Lateral step-up: 1 x 10 (R), 6\" step   Shuttle DLP: x 20, 5 cords     Therapeutic Exercise: x 20min  PROM - knee extension with overpressure: x 5 (R)   PROM - knee flexion x 5 (R)   DL bridging: 2 x 10    LAQ: x 20 (R)   Standing TKE: 2 x 10 (R), green theraband   Standing hip ABD: 1 x 20 (B)   Standing hip ext: 1 x 20 (B)   Standing HSC: x 20 (R)   DLHR: x 20     Neuromuscular Re-education: x 5min  Quad set: 1 x 20 x 3\" (R)   SLR with quad set: 2 x 10 (R)      Manual Therapy: x 10min  Soft tissue mobilization: (R) quad, ITB/VL   Edema massage: (R) knee   Sup/inf patellar joint mobs: Grade 3, 4 x 10\" (R)     Provider Interactions With Patient:   Educated patient on quad/extensor lag to facilitate improved quad activation.   Verbally confirmed the patient's next  appt date and time to ensure consistency with physical therapy attendance.     Assessment: Arianna has discontinued use of her crutches for daily ambulation. She showed good quad control with SLR so her brace was unlocked today. We will continue to progress quad strength and knee stability to facilitate discontinued brace use. The patient continues to progress her exercises this date with good tolerance. We will continue to progress the patient as tolerated.     Goals:   Short-Term Goals:  Patient will demonstrates knee flexion AROM to 90deg pain-free to facilitate sitting comfortably for 2 hours daily for sitting in a chair or at a desk. Timeframe: 4-6 visits.  Patient will improve knee flexion AROM to 120deg to allow for reciprocal stair navigation pattern for household ambulation. Timeframe: 6-8 visits.  Patient will improve knee strength and stability to facilitate discontinued use of AD for daily ambulation. Timeframe: 8-10 visits.   The patient will improve quad strength and single-leg stance stability to demonstrate non-antalgic gait pattern with walking short-length distances for household ambulation. Timeframe: 10-12 visits.  The patient will improve LE strength and endurance to facilitate standing for extended periods of time for 2-3 hours daily for household and community ambulation. Timeframe: 10-12 visits.  Long-Term Goals:  The patient will improve LE strength and endurance to facilitate walking distances of 1-2 mile(s) daily for household and community ambulation. Timeframe: 14-16 visits.  Patient will improve lower motor control to perform double-leg squat with symmetrical loading, without asymmetries, and with proper posterior chain loading to facilitate squatting and lifting ADL's. Timeframe: 16 visits.  Patient will improve LE mobility, strength, and single-leg stabilization to meet strength requirements for reciprocal stair navigation pattern with no asymmetries for ascending and descending 5  flights of stairs daily for community integration. Timeframe: 20 visits.  Patient will improve dynamic LE strength and stabilization to facilitate running intermittent short distances for return to general health and wellness and for safety with crossing the street quickly, if needed. Timeframe:  24 visits.  Patient will improve lower quarter motor control to perform a single-leg squat on involved side with proper posterior chain loading and adequate trunk, hip and knee stabilization to facilitate normalized strength for safe return to sports participation. Timeframe: 28-30 visits.  Patient will improve LEFS score by at least 9 points to indicate a true change in improved function for ADL's and restoring PLF. Timeframe: 28-30 visits.    Plan: Follow up on tolerance to unlocking brace next session.       Charges: MT x 1, Therex x 2         Total Timed Treatment: 45min    Total Treatment Time: 45min

## 2024-07-18 ENCOUNTER — OFFICE VISIT (OUTPATIENT)
Dept: PHYSICAL THERAPY | Age: 19
End: 2024-07-18
Attending: ORTHOPAEDIC SURGERY
Payer: COMMERCIAL

## 2024-07-18 PROCEDURE — 97110 THERAPEUTIC EXERCISES: CPT

## 2024-07-18 PROCEDURE — 97112 NEUROMUSCULAR REEDUCATION: CPT

## 2024-07-18 PROCEDURE — 97140 MANUAL THERAPY 1/> REGIONS: CPT

## 2024-07-18 NOTE — PROGRESS NOTES
Date of Service: 7/18/2024  Dx: Rupture of posterior cruciate ligament of right knee, subsequent encounter (S83.521D)          DOS: 6/25/24      Insurance: [facility]  Insurance Limits: auth required  Visit #: 7  Authorized # of Visits: 12 authorized  POC/Auth Expiration: 9/24/24  Date of Last PN: 6/26/24 (Visit #1)  Authorizing Physician/Provider: Ben Diaz MD visit: 7/1/24  Fall Risk: Standard         Precautions: None            Subjective: Arianna reports that she is trying to walk normally with her brace unlocked. She reports that she noticed some firm swelling over the lateral aspect of her knee that feels different than her left side.     The patient reports that she was seen for some upper back pain prior to her surgery. She reports no known CELINA or injury. She is with some difficulty identifying aggravating activities. She has had an US and an endoscopy without any significant findings. She reports that she will lose her appetite during episodes and that she has a difficult time concentrating in school because of the discomfort.     Objective:     Pain/Symptom Presentation:    Pain at rest: 0/10  Pain at worst: 3/10    HEP: Patient was issued a HEP handout 7/9/24 including SLR, DL bridging, LAQ, standing hip ABD and ext, and elastic band TKE.     Treatment:    Therapeutic Activities: x 13min  DL squat: 1 x 10 -  cues for weight shift   Step-up: 1 x 10 (R), 6\" step   Lateral step-up: 1 x 10 (R), 6\" step   Shuttle DLP: x 20, 5 cords     Therapeutic Exercise: x 5min  DL bridging: 2 x 10    LAQ: x 20 (R), 2# ankle weight     Neuromuscular Re-education: x 12min  Quad set: 1 x 20 x 3\" (R)   SLR with quad set: 2 x 10 (R), 1# ankle weight  Tandem stance balance: x 30\" (B)   SLB: x 30\" (B)  Ankle wobble/balance board: A/P, M/L taps x 20, balance 2 x 20\" each     Manual Therapy: x 10min  Soft tissue mobilization: (R) quad, ITB/VL, hamstrings  Incisional mobility x 5min  Sup/inf patellar joint mobs:  Grade 3, 4 x 10\" (R)     Provider Interactions With Patient:   Educated patient on quad/extensor lag to facilitate improved quad activation.   Verbally confirmed the patient's next appt date and time to ensure consistency with physical therapy attendance.     Assessment: Arianna still shows a mildly antalgic gait pattern upon entering the clinic with her brace unlocked. She shows good quad activation and TKE control during static SLS on her involved side. We will continue to progress stability at her knee for more dynamic activities to facilitate strength for discontinued brace use. The patient progressed balance exercises this date as well as quad strengthening exercises with good tolerance.     The patient presented with some complaints of upper back pain that had started prior to her surgery that have returned. Her pain seems to affect her appetite and her ability to swallow. She notes that sometimes this increases when she drinks cold water quickly. In discussion with the patient, her symptoms don't seem mechanical. She was advised to bring to her doctor's attention that her symptoms have returned.     Goals:   Short-Term Goals:  Patient will demonstrates knee flexion AROM to 90deg pain-free to facilitate sitting comfortably for 2 hours daily for sitting in a chair or at a desk. Timeframe: 4-6 visits.  Patient will improve knee flexion AROM to 120deg to allow for reciprocal stair navigation pattern for household ambulation. Timeframe: 6-8 visits.  Patient will improve knee strength and stability to facilitate discontinued use of AD for daily ambulation. Timeframe: 8-10 visits.   The patient will improve quad strength and single-leg stance stability to demonstrate non-antalgic gait pattern with walking short-length distances for household ambulation. Timeframe: 10-12 visits.  The patient will improve LE strength and endurance to facilitate standing for extended periods of time for 2-3 hours daily for household and  community ambulation. Timeframe: 10-12 visits.  Long-Term Goals:  The patient will improve LE strength and endurance to facilitate walking distances of 1-2 mile(s) daily for household and community ambulation. Timeframe: 14-16 visits.  Patient will improve lower motor control to perform double-leg squat with symmetrical loading, without asymmetries, and with proper posterior chain loading to facilitate squatting and lifting ADL's. Timeframe: 16 visits.  Patient will improve LE mobility, strength, and single-leg stabilization to meet strength requirements for reciprocal stair navigation pattern with no asymmetries for ascending and descending 5 flights of stairs daily for community integration. Timeframe: 20 visits.  Patient will improve dynamic LE strength and stabilization to facilitate running intermittent short distances for return to general health and wellness and for safety with crossing the street quickly, if needed. Timeframe:  24 visits.  Patient will improve lower quarter motor control to perform a single-leg squat on involved side with proper posterior chain loading and adequate trunk, hip and knee stabilization to facilitate normalized strength for safe return to sports participation. Timeframe: 28-30 visits.  Patient will improve LEFS score by at least 9 points to indicate a true change in improved function for ADL's and restoring PLF. Timeframe: 28-30 visits.    Plan: Continue to progress balance and stability for discontinued brace use.       Charges: MT x 1, Therex x 1, NMR x 1    Total Timed Treatment: 45min    Total Treatment Time: 45min

## 2024-07-19 ENCOUNTER — TELEPHONE (OUTPATIENT)
Dept: FAMILY MEDICINE CLINIC | Facility: CLINIC | Age: 19
End: 2024-07-19

## 2024-07-19 NOTE — TELEPHONE ENCOUNTER
Patient called and states she has been seeing her physical therapist for her back pain for a few weeks now and per the therapist, they do not think the pain is muscle related. Patient states she has already had a few tests done regarding this with no answers and is wondering what Dr. Dela Cruz recommends as next steps. Please advise

## 2024-07-22 NOTE — TELEPHONE ENCOUNTER
Patient called back - had been seeing physical therapy for her knee but has also had mid/thoracic back pain.  Physical therapist told her she didn't think it was muscular and should contact PCP.    Patient was seen by Dr. Dela Cruz on 4/29/24 for back pain.  States has had over a year.  It is intermittent - mid to upper back and will feel nauseous at times.    Please advise on next step.

## 2024-07-22 NOTE — TELEPHONE ENCOUNTER
Future Appointments   Date Time Provider Department Center   7/23/2024  2:00 PM Gia Ohara, PT [facility] [place]   7/25/2024  2:00 PM Gia Ohara, PT [facility] [place]   7/30/2024  2:00 PM Gia Ohara, PT [facility] [place]   8/1/2024  2:00 PM Gia Ohara, PT [facility] [place]   8/6/2024  9:00 AM Gia Ohara, PT [facility] [place]   8/7/2024  2:40 PM Sherrell Zheng APRN [facility] [place]   8/9/2024 12:15 PM Gia Ohara, PT [facility] [place]   8/12/2024  9:45 AM Anika Murphy, PTA [facility] [place]   8/15/2024  9:45 AM Anika Murphy, PTA [facility] [place]   8/19/2024  9:45 AM Anika Murphy, PTA [facility] [place]   10/9/2024  3:20 PM Yoni Hines MD [facility] [place]     Patient scheduled with Sherrell Zheng on 8/7/24

## 2024-07-23 ENCOUNTER — OFFICE VISIT (OUTPATIENT)
Dept: PHYSICAL THERAPY | Age: 19
End: 2024-07-23
Attending: ORTHOPAEDIC SURGERY
Payer: COMMERCIAL

## 2024-07-23 PROCEDURE — 97530 THERAPEUTIC ACTIVITIES: CPT

## 2024-07-23 PROCEDURE — 97112 NEUROMUSCULAR REEDUCATION: CPT

## 2024-07-23 NOTE — PROGRESS NOTES
Date of Service: 7/23/2024  Dx: Rupture of posterior cruciate ligament of right knee, subsequent encounter (S83.521D)          DOS: 6/25/24      Insurance: [facility]  Insurance Limits: auth required  Visit #: 8  Authorized # of Visits: 12 authorized  POC/Auth Expiration: 9/24/24  Date of Last PN: 6/26/24 (Visit #1)  Authorizing Physician/Provider: Ben Diaz MD visit: 7/1/24  Fall Risk: Standard         Precautions: None            Subjective: The patient denies any new complaints. Arianna reports that she was able to make a follow-up appt with her MD regarding her upper back pain.     Objective:     Pain/Symptom Presentation:    Pain at rest: 0/10  Pain at worst: 3/10    HEP: Patient was issued a HEP handout 7/9/24 including SLR, DL bridging, LAQ, standing hip ABD and ext, and elastic band TKE.     Treatment:    Therapeutic Activities: x 15min  DL squat: 1 x 10 -  cues for weight shift   Step-up: 1 x 10 (R), 8\" step   Lateral step-up: 1 x 10 (R), 8\" step   Step-down: 1 x 10 (R), 6\" step  Shuttle DLP: x 20, 5 cords   Shuttle SLP: x 20 (B), 4 cords, cues for TKE    Therapeutic Exercise: x 6min  SLR with quad set: 2 x 10 (R), 1.5# ankle weight  DL bridging march: 1 x 10    Elastic band lateral walk: 2 laps in parallel bars, red theraband     Neuromuscular Re-education: x 9min  Murtaza 90-90 hold: 2 x 30\"   SLB: x 30\" (B)  SLB on airex: x 30\" (B)  Ankle wobble/balance board: A/P, M/L taps x 20, balance 2 x 20\" each     Manual Therapy: x 5min  Soft tissue mobilization: (R) quad, ITB/VL  Sup/inf patellar joint mobs: Grade 3, 4 x 10\" (R)     Provider Interactions With Patient:   Educated patient on quad/extensor lag to facilitate improved quad activation.   Verbally confirmed the patient's next appt date and time to ensure consistency with physical therapy attendance.     Assessment: Arianna continues to progress well. We continue to progress balance activities, adding uneven surfaces for additional challenge.  The patient was also taken through additional progressions in squatting and stair navigation exercises for additional quad strength. The patient would benefit from continued PT for further progression in LE strength and stability.     Goals:   Short-Term Goals:  Patient will demonstrates knee flexion AROM to 90deg pain-free to facilitate sitting comfortably for 2 hours daily for sitting in a chair or at a desk. Timeframe: 4-6 visits.  Patient will improve knee flexion AROM to 120deg to allow for reciprocal stair navigation pattern for household ambulation. Timeframe: 6-8 visits.  Patient will improve knee strength and stability to facilitate discontinued use of AD for daily ambulation. Timeframe: 8-10 visits.   The patient will improve quad strength and single-leg stance stability to demonstrate non-antalgic gait pattern with walking short-length distances for household ambulation. Timeframe: 10-12 visits.  The patient will improve LE strength and endurance to facilitate standing for extended periods of time for 2-3 hours daily for household and community ambulation. Timeframe: 10-12 visits.  Long-Term Goals:  The patient will improve LE strength and endurance to facilitate walking distances of 1-2 mile(s) daily for household and community ambulation. Timeframe: 14-16 visits.  Patient will improve lower motor control to perform double-leg squat with symmetrical loading, without asymmetries, and with proper posterior chain loading to facilitate squatting and lifting ADL's. Timeframe: 16 visits.  Patient will improve LE mobility, strength, and single-leg stabilization to meet strength requirements for reciprocal stair navigation pattern with no asymmetries for ascending and descending 5 flights of stairs daily for community integration. Timeframe: 20 visits.  Patient will improve dynamic LE strength and stabilization to facilitate running intermittent short distances for return to general health and wellness and for  safety with crossing the street quickly, if needed. Timeframe:  24 visits.  Patient will improve lower quarter motor control to perform a single-leg squat on involved side with proper posterior chain loading and adequate trunk, hip and knee stabilization to facilitate normalized strength for safe return to sports participation. Timeframe: 28-30 visits.  Patient will improve LEFS score by at least 9 points to indicate a true change in improved function for ADL's and restoring PLF. Timeframe: 28-30 visits.    Plan: Re-assess readiness for discontinued brace use.       Charges: NMR x 1, Ther Act x 1    Total Timed Treatment: 35min    Total Treatment Time: 35min

## 2024-07-25 ENCOUNTER — OFFICE VISIT (OUTPATIENT)
Dept: PHYSICAL THERAPY | Age: 19
End: 2024-07-25
Attending: ORTHOPAEDIC SURGERY
Payer: COMMERCIAL

## 2024-07-25 PROCEDURE — 97110 THERAPEUTIC EXERCISES: CPT

## 2024-07-25 PROCEDURE — 97140 MANUAL THERAPY 1/> REGIONS: CPT

## 2024-07-25 PROCEDURE — 97112 NEUROMUSCULAR REEDUCATION: CPT

## 2024-07-25 NOTE — PROGRESS NOTES
Date of Service: 7/25/2024  Dx: Rupture of posterior cruciate ligament of right knee, subsequent encounter (S83.521D)          DOS: 6/25/24      Insurance: [facility]  Insurance Limits: auth required  Visit #: 9  Authorized # of Visits: 12 authorized  POC/Auth Expiration: 9/24/24  Date of Last PN: 6/26/24 (Visit #1)  Authorizing Physician/Provider: Ben Diaz MD visit: 7/1/24  Fall Risk: Standard         Precautions: None            Subjective: The patient denies any new complaints. Arianna reports that she was able to make a follow-up appt with her MD regarding her upper back pain.     Objective:     Pain/Symptom Presentation:    Pain at rest: 0/10  Pain at worst: 3/10    HEP: Patient was issued a HEP handout 7/9/24 including SLR, DL bridging, LAQ, standing hip ABD and ext, and elastic band TKE.     Treatment:    Therapeutic Activities: x 15min  DL squat: 1 x 10 -  cues for weight shift   Step-up: 1 x 10 (R), 8\" step   Lateral step-up: 1 x 10 (R), 8\" step   Step-down: 1 x 10 (R), 6\" step  Shuttle DLP: x 20, 6 cords   Shuttle SLP: x 20 (B), 5 cords     Therapeutic Exercise: x 7min  SLR with quad set: 2 x 10 (R), 1.5# ankle weight  DL bridging march: 1 x 10    Elastic band lateral walk: 2 laps in parallel bars, red theraband     Neuromuscular Re-education: x 10min  Murtaza 90-90 hold: 2 x 30\"   SLB on airex: x 30\" (B)  SLB cone reach: 1 x 5 (B)   Ankle wobble/balance board: A/P, M/L taps x 20, balance 2 x 20\" each     Manual Therapy: x 10min  Soft tissue mobilization: (R) quad, ITB/VL  Sup/inf patellar joint mobs: Grade 3, 4 x 10\" (R)     Provider Interactions With Patient:   Educated patient on quad/extensor lag to facilitate improved quad activation.   Verbally confirmed the patient's next appt date and time to ensure consistency with physical therapy attendance.     Assessment: Arianna was given permission to wean from her brace for daily ambulation based on her strength and stability with her dynamic  balance activities.     Goals:   Short-Term Goals:  Patient will demonstrates knee flexion AROM to 90deg pain-free to facilitate sitting comfortably for 2 hours daily for sitting in a chair or at a desk. Timeframe: 4-6 visits.  Patient will improve knee flexion AROM to 120deg to allow for reciprocal stair navigation pattern for household ambulation. Timeframe: 6-8 visits.  Patient will improve knee strength and stability to facilitate discontinued use of AD for daily ambulation. Timeframe: 8-10 visits.   The patient will improve quad strength and single-leg stance stability to demonstrate non-antalgic gait pattern with walking short-length distances for household ambulation. Timeframe: 10-12 visits.  The patient will improve LE strength and endurance to facilitate standing for extended periods of time for 2-3 hours daily for household and community ambulation. Timeframe: 10-12 visits.  Long-Term Goals:  The patient will improve LE strength and endurance to facilitate walking distances of 1-2 mile(s) daily for household and community ambulation. Timeframe: 14-16 visits.  Patient will improve lower motor control to perform double-leg squat with symmetrical loading, without asymmetries, and with proper posterior chain loading to facilitate squatting and lifting ADL's. Timeframe: 16 visits.  Patient will improve LE mobility, strength, and single-leg stabilization to meet strength requirements for reciprocal stair navigation pattern with no asymmetries for ascending and descending 5 flights of stairs daily for community integration. Timeframe: 20 visits.  Patient will improve dynamic LE strength and stabilization to facilitate running intermittent short distances for return to general health and wellness and for safety with crossing the street quickly, if needed. Timeframe:  24 visits.  Patient will improve lower quarter motor control to perform a single-leg squat on involved side with proper posterior chain loading and  adequate trunk, hip and knee stabilization to facilitate normalized strength for safe return to sports participation. Timeframe: 28-30 visits.  Patient will improve LEFS score by at least 9 points to indicate a true change in improved function for ADL's and restoring PLF. Timeframe: 28-30 visits.    Plan: Follow-up on tolerance to walking without brace.       Charges: MT x 1, Therex x 1, NMR x 1    Total Timed Treatment: 42min    Total Treatment Time: 42min

## 2024-07-30 ENCOUNTER — OFFICE VISIT (OUTPATIENT)
Dept: PHYSICAL THERAPY | Age: 19
End: 2024-07-30
Attending: ORTHOPAEDIC SURGERY
Payer: COMMERCIAL

## 2024-07-30 PROCEDURE — 97530 THERAPEUTIC ACTIVITIES: CPT

## 2024-07-30 PROCEDURE — 97112 NEUROMUSCULAR REEDUCATION: CPT

## 2024-07-30 PROCEDURE — 97110 THERAPEUTIC EXERCISES: CPT

## 2024-07-30 NOTE — PROGRESS NOTES
Date of Service: 7/30/2024  Dx: Rupture of posterior cruciate ligament of right knee, subsequent encounter (S83.521D)          DOS: 6/25/24      Insurance: [facility]  Insurance Limits: auth required  Visit #: 10  Authorized # of Visits: 12 authorized  POC/Auth Expiration: 9/24/24  Date of Last PN: 6/26/24 (Visit #1)  Authorizing Physician/Provider: Ben Diaz MD visit: 7/1/24  Fall Risk: Standard         Precautions: None            Subjective: The patient reports that she was sitting and had the brace one and moved funny and \"it started hurting really bad. It hurt for 30 seconds. It was hurting a lot in my thigh. I was really scared I re-tore it, but it seems to be okay right now.\"     The patient reports that she's not using the brace much.     Objective:     Pain/Symptom Presentation:    Pain at rest: 0/10  Pain at worst: 3/10    HEP: Patient was issued a HEP handout 7/9/24 including SLR, DL bridging, LAQ, standing hip ABD and ext, and elastic band TKE.     Treatment:    Therapeutic Activities: x 20min  DL squat: 1 x 10 -  cues for weight shift   Step-up: 1 x 10 (R), 8\" step   Lateral step-up: 1 x 10 (R), 8\" step   Step-down: 1 x 10 (R), 8\" step  Shuttle DLP: x 20, 6 cords   Shuttle SLP: x 20 (B), 5 cords   Standing split squat: 1 x 10 (B)     Therapeutic Exercise: x 9min  SLR with quad set: 2 x 10 (R), 2# ankle weight  DL bridging march: 1 x 10    Elastic band lateral walk: x 2 laps in parallel bars, red theraband     Neuromuscular Re-education: x 6min  SLB on airex: x 30\" (B)  Ankle wobble/balance board: A/P, M/L taps x 20, balance 2 x 20\" each     Manual Therapy: x 10min  Soft tissue mobilization: (R) quad, ITB/VL  Sup/inf patellar joint mobs: Grade 3, 4 x 10\" (R)     Provider Interactions With Patient:   Educated patient on quad/extensor lag to facilitate improved quad activation.   Verbally confirmed the patient's next appt date and time to ensure consistency with physical therapy  attendance.     Assessment: Arianna is weaning from using her brace for daily ambulation. She demonstrates good walking gait pattern and continues to progress her LE strengthening exercises. The patient would benefit from continued PT for further progression in LE strength and stability.     Goals:   Short-Term Goals:  Patient will demonstrates knee flexion AROM to 90deg pain-free to facilitate sitting comfortably for 2 hours daily for sitting in a chair or at a desk. Timeframe: 4-6 visits.  Patient will improve knee flexion AROM to 120deg to allow for reciprocal stair navigation pattern for household ambulation. Timeframe: 6-8 visits.  Patient will improve knee strength and stability to facilitate discontinued use of AD for daily ambulation. Timeframe: 8-10 visits.   The patient will improve quad strength and single-leg stance stability to demonstrate non-antalgic gait pattern with walking short-length distances for household ambulation. Timeframe: 10-12 visits.  The patient will improve LE strength and endurance to facilitate standing for extended periods of time for 2-3 hours daily for household and community ambulation. Timeframe: 10-12 visits.  Long-Term Goals:  The patient will improve LE strength and endurance to facilitate walking distances of 1-2 mile(s) daily for household and community ambulation. Timeframe: 14-16 visits.  Patient will improve lower motor control to perform double-leg squat with symmetrical loading, without asymmetries, and with proper posterior chain loading to facilitate squatting and lifting ADL's. Timeframe: 16 visits.  Patient will improve LE mobility, strength, and single-leg stabilization to meet strength requirements for reciprocal stair navigation pattern with no asymmetries for ascending and descending 5 flights of stairs daily for community integration. Timeframe: 20 visits.  Patient will improve dynamic LE strength and stabilization to facilitate running intermittent short  distances for return to general health and wellness and for safety with crossing the street quickly, if needed. Timeframe:  24 visits.  Patient will improve lower quarter motor control to perform a single-leg squat on involved side with proper posterior chain loading and adequate trunk, hip and knee stabilization to facilitate normalized strength for safe return to sports participation. Timeframe: 28-30 visits.  Patient will improve LEFS score by at least 9 points to indicate a true change in improved function for ADL's and restoring PLF. Timeframe: 28-30 visits.    Plan: Continue to progress LE strength and stability.       Charges: Ther Act x 1, Therex x 1, NMR x 1    Total Timed Treatment: 45min    Total Treatment Time: 45min

## 2024-08-01 ENCOUNTER — OFFICE VISIT (OUTPATIENT)
Dept: PHYSICAL THERAPY | Age: 19
End: 2024-08-01
Attending: ORTHOPAEDIC SURGERY
Payer: COMMERCIAL

## 2024-08-01 PROCEDURE — 97110 THERAPEUTIC EXERCISES: CPT

## 2024-08-01 PROCEDURE — 97140 MANUAL THERAPY 1/> REGIONS: CPT

## 2024-08-01 NOTE — PROGRESS NOTES
Date of Service: 8/1/2024  Dx: Rupture of posterior cruciate ligament of right knee, subsequent encounter (S83.521D)          DOS: 6/25/24      Insurance: [facility]  Insurance Limits: auth required  Visit #: 11  Authorized # of Visits: 12 authorized  POC/Auth Expiration: 9/24/24  Date of Last PN: 6/26/24 (Visit #1)  Authorizing Physician/Provider: Ben Diaz MD visit: 7/1/24  Fall Risk: Standard         Precautions: None            Subjective: The patient reports no new complaints at her knee. She does have a migraine today.     Objective:     Pain/Symptom Presentation:    Pain at rest: 0/10  Pain at worst: 2/10    HEP: Patient was issued a HEP handout 7/9/24 including SLR, DL bridging, LAQ, standing hip ABD and ext, and elastic band TKE.     Treatment:    Therapeutic Activities: x 10min  Step-up: 2 x 10 (R), 8\" step   Lateral step-up: 2 x 10 (R), 8\" step   Step-down: 1 x 10 (R), 8\" step    Therapeutic Exercise: x 5min  SLR with quad set: 2 x 10 (R), 2# ankle weight  DL bridging march: 1 x 10      Neuromuscular Re-education: x 5min  Dead bug: 1 x 10   Ankle wobble/balance board: A/P, M/L taps x 20, balance 2 x 20\" each     Manual Therapy: x 10min  Soft tissue mobilization: (R) quad, ITB/VL  Sup/inf patellar joint mobs: Grade 3, 4 x 10\" (R)     Provider Interactions With Patient:   Educated patient on quad/extensor lag to facilitate improved quad activation.   Verbally confirmed the patient's next appt date and time to ensure consistency with physical therapy attendance.     Assessment: Arianna arrived today with a headache migraine that limited her ability to participate in a full session. The patient was able to complete some of her exercises, but requested to go home due to general not feeling well.     Goals:   Short-Term Goals:  Patient will demonstrates knee flexion AROM to 90deg pain-free to facilitate sitting comfortably for 2 hours daily for sitting in a chair or at a desk. Timeframe: 4-6  visits.  Patient will improve knee flexion AROM to 120deg to allow for reciprocal stair navigation pattern for household ambulation. Timeframe: 6-8 visits.  Patient will improve knee strength and stability to facilitate discontinued use of AD for daily ambulation. Timeframe: 8-10 visits.   The patient will improve quad strength and single-leg stance stability to demonstrate non-antalgic gait pattern with walking short-length distances for household ambulation. Timeframe: 10-12 visits.  The patient will improve LE strength and endurance to facilitate standing for extended periods of time for 2-3 hours daily for household and community ambulation. Timeframe: 10-12 visits.  Long-Term Goals:  The patient will improve LE strength and endurance to facilitate walking distances of 1-2 mile(s) daily for household and community ambulation. Timeframe: 14-16 visits.  Patient will improve lower motor control to perform double-leg squat with symmetrical loading, without asymmetries, and with proper posterior chain loading to facilitate squatting and lifting ADL's. Timeframe: 16 visits.  Patient will improve LE mobility, strength, and single-leg stabilization to meet strength requirements for reciprocal stair navigation pattern with no asymmetries for ascending and descending 5 flights of stairs daily for community integration. Timeframe: 20 visits.  Patient will improve dynamic LE strength and stabilization to facilitate running intermittent short distances for return to general health and wellness and for safety with crossing the street quickly, if needed. Timeframe:  24 visits.  Patient will improve lower quarter motor control to perform a single-leg squat on involved side with proper posterior chain loading and adequate trunk, hip and knee stabilization to facilitate normalized strength for safe return to sports participation. Timeframe: 28-30 visits.  Patient will improve LEFS score by at least 9 points to indicate a true  change in improved function for ADL's and restoring PLF. Timeframe: 28-30 visits.    Plan: Re-integrate and progress strengthening exercises as tolerated.       Charges: MT x 1, Therex x 1    Total Timed Treatment: 30min    Total Treatment Time: 30min

## 2024-08-06 ENCOUNTER — OFFICE VISIT (OUTPATIENT)
Dept: PHYSICAL THERAPY | Age: 19
End: 2024-08-06
Attending: NURSE PRACTITIONER
Payer: COMMERCIAL

## 2024-08-06 DIAGNOSIS — M54.9 UPPER BACK PAIN: ICD-10-CM

## 2024-08-06 DIAGNOSIS — S83.521D RUPTURE OF POSTERIOR CRUCIATE LIGAMENT OF RIGHT KNEE, SUBSEQUENT ENCOUNTER: Primary | ICD-10-CM

## 2024-08-06 PROCEDURE — 97110 THERAPEUTIC EXERCISES: CPT

## 2024-08-06 PROCEDURE — 97530 THERAPEUTIC ACTIVITIES: CPT

## 2024-08-06 PROCEDURE — 97112 NEUROMUSCULAR REEDUCATION: CPT

## 2024-08-06 NOTE — PROGRESS NOTES
PHYSICAL THERAPY RE-EVALUATION:      Date of Service: 8/6/2024  Dx: Rupture of posterior cruciate ligament of right knee, subsequent encounter (S83.521D)          DOS: 6/25/24      Insurance: [facility]  Insurance Limits: auth required  Visit #: 12  Authorized # of Visits: 12 authorized  POC/Auth Expiration: 9/24/24  Date of Last PN: 6/26/24 (Visit #1)  Authorizing Physician/Provider: Ben Diaz MD visit: 7/1/24  Fall Risk: Standard         Precautions: None            Subjective: The patient reports that she feels she's 80% recovered. \"It still gets stiff if I'm sitting for a while.\" She reports that eventually she would like to kneel on it and be able to run and jump.     Objective:     Pain/Symptom Presentation:    Pain at rest: 0/10  Pain at worst: 2/10     Outcomes Measure(s):  LEFS: 66.25% function (improved from 6.25% function)     Activity Measures:  Sleeping: No Activity Limitation: Patient is able to sleep without limitations.  Sitting: No Activity Limitation: Patient is only able to sit without limitations.  Standing: No Activity Limitation: Patient is able to stand without limitations.   Car Transfers: No Activity Limitation: Patient is able to perform car transfers safely and independently.   Walking: Mild Activity Limitation: Patient is only able to walk up to 2 miles at a time.   Driving: No Activity Limitation: Patient is unable to drive.   Ascending Stairs: Mild Activity Limitation: Patient navigates up the stairs with step-over gait pattern, mild deviations.   Descending Stairs: Moderate Activity Limitation: Patient navigates down the stairs with step-to gait pattern.   Stairs: Moderate Activity Limitation: Patient is able to navigate 2-3 flights of stairs at a time.   Running Short Distances: Extreme Activity Limitation: Patient is unable to run.     ROM:          Knee Motion PROM AROM    Right Left Right Left   Knee Extension 0 0 0 0   Knee Flexion 130 140 125 135   *indicates  activity was associated with pain     Strength/MMT:        Knee Motion Strength    Right Left   Knee Extension 4-/5 5/5   Knee Flexion 4/5 5/5   *indicates activity was associated with pain          Hip Motion Strength    Right Left   Hip Flexion 4/5 4+/5   Hip Extension 4-/5 4+/5   Hip ABD 4-/5 4+/5   *indicates activity was associated with pain    HEP: Patient was issued a HEP handout 7/9/24 including SLR, DL bridging, LAQ, standing hip ABD and ext, and elastic band TKE.     Treatment:    Therapeutic Activities: x 20min  Step-up: 2 x 10 (R), 8\" step   Lateral step-up: 2 x 10 (R), 8\" step   Step-down: 1 x 10 (R), 6\" step  Shuttle DLP: x 30, 6 cords   Shuttle SLP: x 20 (B), 5 cords   Standing split squat: 1 x 10 (B)      Therapeutic Exercise: x 5min  DL bridging march: 1 x 10    Elastic band lateral walk: x 2 laps in parallel bars, red theraband     Neuromuscular Re-education: x 5min  Dead bug: 1 x 10   Ankle wobble/balance board: A/P, M/L taps x 20, balance 2 x 20\" each     Manual Therapy: x 10min  Soft tissue mobilization: (R) quad, ITB/VL  Sup/inf patellar joint mobs: Grade 3, 4 x 10\" (R)     Provider Interactions With Patient:   Discussed the patient's remaining symptoms, functional limitations, and concerns to establish updated POC.  Verbally confirmed the patient's next appt date and time to ensure consistency with physical therapy attendance.     Assessment: Arianna is a 18 year old female that presents to physical therapy evaluation s/p (R) PCL reconstruction 6/25/24 by Dr. Contreras. Her injury occurred when she fell on her right knee while she was ice skating. Arianna has been seen for 12 sessions in physical therapy for treatment including manual therapy for improved knee mobility and exercises for progression in mobility, strength, endurance, and stability. The patient demonstrates normalized knee ROM. She has discontinued any use of a brace or AD for daily ambulation. She is navigating up the stairs with  step-over gait pattern and was encouraged to start doing so for descending stairs as well. She shows a mild weight shift with DL squat, but is able to correct once cued. The patient is still with remaining strength deficits on her involved leg for higher level activities like lunging and running. The patient would benefit from continued PT for further progression in strength and stability for full return to PLF.     Goals:   Short-Term Goals:  Patient will demonstrates knee flexion AROM to 90deg pain-free to facilitate sitting comfortably for 2 hours daily for sitting in a chair or at a desk. Timeframe: 4-6 visits. (MET 8/6/24)  Patient will improve knee flexion AROM to 120deg to allow for reciprocal stair navigation pattern for household ambulation. Timeframe: 6-8 visits.  Patient will improve knee strength and stability to facilitate discontinued use of AD for daily ambulation. Timeframe: 8-10 visits. (MET 8/6/24)  The patient will improve quad strength and single-leg stance stability to demonstrate non-antalgic gait pattern with walking short-length distances for household ambulation. Timeframe: 10-12 visits. (MET 8/6/24)  The patient will improve LE strength and endurance to facilitate standing for extended periods of time for 2-3 hours daily for household and community ambulation. Timeframe: 10-12 visits. (MET 8/6/24)  Long-Term Goals:  The patient will improve LE strength and endurance to facilitate walking distances of 1-2 mile(s) daily for household and community ambulation. Timeframe: 14-16 visits. (MET 8/6/24)  Patient will improve lower motor control to perform double-leg squat with symmetrical loading, without asymmetries, and with proper posterior chain loading to facilitate squatting and lifting ADL's. Timeframe: 16 visits. (IN PROGRESS 8/6/24)  Patient will improve LE mobility, strength, and single-leg stabilization to meet strength requirements for reciprocal stair navigation pattern with no  asymmetries for ascending and descending 5 flights of stairs daily for community integration. Timeframe: 20 visits. (IN PROGRESS 8/6/24)  Patient will improve dynamic LE strength and stabilization to facilitate running intermittent short distances for return to general health and wellness and for safety with crossing the street quickly, if needed. Timeframe:  24 visits. (IN PROGRESS 8/6/24)  Patient will improve lower quarter motor control to perform a single-leg squat on involved side with proper posterior chain loading and adequate trunk, hip and knee stabilization to facilitate normalized strength for safe return to sports participation. Timeframe: 28-30 visits. (IN PROGRESS 8/6/24)  Patient will improve LEFS score by at least 9 points to indicate a true change in improved function for ADL's and restoring PLF. Timeframe: 28-30 visits. (MET 8/6/24)    Plan: Patient will be seen for 2x/week for an additional 4 weeks, for another 8 vists, over a 90 day period. We will re-evaluate the patient at that time in order to determine functional progress, evaluate short-term goal completion, and establish an updated plan of care. Possible treatment interventions will/may include: Therapeutic Activities, Therapeutic Exercise, Neuromuscular Re-education, Manual Therapy, Home Exercise Program Instruction, Patient Education, Self-Care/Home Management, Gait Training, and Modalities as needed.       Charges: Therex x 1, NMR x 1, Ther Act x 1  Total Timed Treatment: 40min    Total Treatment Time: 40min

## 2024-08-07 ENCOUNTER — OFFICE VISIT (OUTPATIENT)
Dept: FAMILY MEDICINE CLINIC | Facility: CLINIC | Age: 19
End: 2024-08-07
Payer: COMMERCIAL

## 2024-08-07 VITALS
TEMPERATURE: 99 F | SYSTOLIC BLOOD PRESSURE: 110 MMHG | DIASTOLIC BLOOD PRESSURE: 74 MMHG | WEIGHT: 134 LBS | OXYGEN SATURATION: 98 % | HEART RATE: 88 BPM | BODY MASS INDEX: 22 KG/M2

## 2024-08-07 DIAGNOSIS — G89.29 CHRONIC THORACIC SPINE PAIN: Primary | ICD-10-CM

## 2024-08-07 DIAGNOSIS — M54.6 CHRONIC THORACIC SPINE PAIN: Primary | ICD-10-CM

## 2024-08-07 PROCEDURE — 3074F SYST BP LT 130 MM HG: CPT | Performed by: NURSE PRACTITIONER

## 2024-08-07 PROCEDURE — 3078F DIAST BP <80 MM HG: CPT | Performed by: NURSE PRACTITIONER

## 2024-08-07 PROCEDURE — 99213 OFFICE O/P EST LOW 20 MIN: CPT | Performed by: NURSE PRACTITIONER

## 2024-08-07 NOTE — PROGRESS NOTES
HPI:     Patient is here for follow up for thoracic back pain. Last saw Dr. Dela Cruz 4/2024 for ER follow up for back pain. Continues to have mid to upper back pain. Started over a year ago. No known injury. Not aggravated by any specific activity. Pain comes and goes. Occasional arm discomfort/heaviness sensation but not numbness or tingling. Thought it was maybe stomach related but had normal abdominal us. Also seeing GI. No medication for back pain. Gets migraines and takes Tylenol and Motrin but doesn't seem to help her back.     Kartik Knight MD - 04/27/2024   Formatting of this note might be different from the original.   X-RAY THORACIC SPINE 3 VIEWS     INDICATION: severe thoracic back pain.     TECHNIQUE: AP, lateral and swimmer's views of the thoracic spine without comparison.     FINDINGS: Spinal alignment is maintained without acute fracture or subluxation. The vertebral body heights and disc spaces are preserved. Visualized lung fields are clear. Soft tissues are unremarkable.     IMPRESSION:   No acute fracture or subluxation. If clinical symptoms of pain persist, follow-up nonemergent outpatient MRI may be considered.     X-RAY LUMBAR SPINE 2 VIEWS     INDICATION: Low back pain.     TECHNIQUE: AP and lateral views of the lumbar spine without comparison.     FINDINGS: There are 5 true lumbar type vertebra without acute fracture or subluxation. The vertebral body heights and disc spaces are preserved. The sacroiliac joints are symmetric without erosions. Soft tissues are unremarkable     Current Outpatient Medications   Medication Sig Dispense Refill    sertraline 50 MG Oral Tab Take 1.5 tablets (75 mg total) by mouth daily.        Past Medical History:    Anxiety    Attention deficit hyperactivity disorder (ADHD)    not on any medications    Autism spectrum disorder (HCC)    pt states she was thought to be on  spectrum when she was school age- does not feel that is correct    Back problem     Depression    Hx of motion sickness    Migraines    Muscle weakness    bilateral hands    Seizure disorder (HCC)    twice in 5th grade- none since then      Past Surgical History:   Procedure Laterality Date    Endoscopy of bowel pouch      Knee surgery Right 06/25/2024    PCL repair      Family History   Problem Relation Age of Onset    Breast Cancer Maternal Grandmother     Breast Cancer Maternal Aunt     Breast Cancer Maternal Great-Grandmother       Social History     Socioeconomic History    Marital status: Single   Tobacco Use    Smoking status: Never     Passive exposure: Never    Smokeless tobacco: Never    Tobacco comments:     non-smoker   Vaping Use    Vaping status: Never Used   Substance and Sexual Activity    Alcohol use: Never    Drug use: Never    Sexual activity: Never     Partners: Male   Other Topics Concern    Caffeine Concern Yes     Comment: 1-2x cups daily    Exercise Yes     Comment: active    Seat Belt Yes     Social Determinants of Health     Food Insecurity: No Food Insecurity (4/25/2024)    Received from [facility]    Food Insecurity     Currently or in the past 3 months, have you worried your food would run out before you had money to buy more?: No     In the past 12 months, have you run out of food or been unable to get more?: No   Transportation Needs: No Transportation Needs (4/25/2024)    Received from [facility]    Transportation Needs     Medical Transportation Needs?: No    Received from [facility], [facility]    Social Connections    Received from [facility], [facility]    Housing Stability         REVIEW OF SYSTEMS:   Review of Systems   Musculoskeletal:  Positive for back pain. Negative for gait problem and myalgias.   Neurological:  Negative for weakness and numbness.       EXAM:   /74   Pulse 88   Temp 98.7 °F (37.1 °C)   Wt 134 lb (60.8 kg)   LMP  07/03/2024 (Approximate)   SpO2 98%   BMI 21.63 kg/m²   Physical Exam  Constitutional:       Appearance: Normal appearance. She is not ill-appearing.   Cardiovascular:      Rate and Rhythm: Normal rate and regular rhythm.      Heart sounds: Normal heart sounds.   Pulmonary:      Effort: Pulmonary effort is normal.      Breath sounds: Normal breath sounds.   Musculoskeletal:      Thoracic back: No spasms, tenderness or bony tenderness.   Neurological:      Mental Status: She is alert.         ASSESSMENT AND PLAN:   Diagnoses and all orders for this visit:    Chronic thoracic spine pain  -     MRI SPINE THORACIC (CPT=72146); Future  -     Ortho Referral - In Network    Reviewed previous xrays. Next step recommended is MRI  Consider ortho evaluation after MRI done

## 2024-08-09 ENCOUNTER — OFFICE VISIT (OUTPATIENT)
Dept: PHYSICAL THERAPY | Age: 19
End: 2024-08-09
Attending: NURSE PRACTITIONER
Payer: COMMERCIAL

## 2024-08-09 PROCEDURE — 97112 NEUROMUSCULAR REEDUCATION: CPT

## 2024-08-09 PROCEDURE — 97530 THERAPEUTIC ACTIVITIES: CPT

## 2024-08-09 PROCEDURE — 97110 THERAPEUTIC EXERCISES: CPT

## 2024-08-09 NOTE — PROGRESS NOTES
Date of Service: 8/6/2024  Dx: Rupture of posterior cruciate ligament of right knee, subsequent encounter (S83.521D)          DOS: 6/25/24      Insurance: [facility]  Insurance Limits: auth required  Visit #: 13  Authorized # of Visits: 20 authorized  POC/Auth Expiration: 9/24/24  Date of Last PN: 8/6/24 (Visit #12)  Authorizing Physician/Provider: Ben Diaz MD visit: 7/1/24  Fall Risk: Standard         Precautions: None            Subjective: The patient reports that she feels she's 80% recovered. \"It still gets stiff if I'm sitting for a while.\" She reports that eventually she would like to kneel on it and be able to run and jump.     Objective:     Pain/Symptom Presentation:    Pain at rest: 0/10  Pain at worst: 2/10     HEP: Patient was issued a HEP handout 7/9/24 including SLR, DL bridging, LAQ, standing hip ABD and ext, and elastic band TKE.     Treatment:    Therapeutic Activities: x 10min  Elevated SL squat: 2 x 10 (R), 6\" step   Reverse slider lunges: 1 x 10 (B) - mild right knee pain during WB   Shuttle SLP: x 20 (R), 5 cords   SLB cone reach: 1 x 10 (B)     Therapeutic Exercise: x 10min  DL bridging march: 1 x 10    Elastic band lateral walk: x 2 laps x 15ft, red theraband   Elastic band base position fwd walk: x 2 laps x 15ft, red theraband - fatiguing     Neuromuscular Re-education: x 10min  Dead bug: 1 x 10 (B)  Ankle wobble/balance board: A/P, M/L taps x 20, balance 2 x 20\" each   Pallof press: 1 x 10 (B), orange power band - cues for posterior chain loading     Manual Therapy: x 10min  Soft tissue mobilization: (R) quad, ITB/VL    Provider Interactions With Patient:   Discussed the patient's remaining symptoms, functional limitations, and concerns to establish updated POC.  Verbally confirmed the patient's next appt date and time to ensure consistency with physical therapy attendance.     Assessment: Arianna was taken through additional exercise progressions this date including split  stance/lunging and SL squatting exercises. The patient was with some mild knee pain during right loading during lunging exercise. We will continue to progress strength and stability at her right knee for full return to PLF.     Goals:   Short-Term Goals:  Patient will demonstrates knee flexion AROM to 90deg pain-free to facilitate sitting comfortably for 2 hours daily for sitting in a chair or at a desk. Timeframe: 4-6 visits. (MET 8/6/24)  Patient will improve knee flexion AROM to 120deg to allow for reciprocal stair navigation pattern for household ambulation. Timeframe: 6-8 visits.  Patient will improve knee strength and stability to facilitate discontinued use of AD for daily ambulation. Timeframe: 8-10 visits. (MET 8/6/24)  The patient will improve quad strength and single-leg stance stability to demonstrate non-antalgic gait pattern with walking short-length distances for household ambulation. Timeframe: 10-12 visits. (MET 8/6/24)  The patient will improve LE strength and endurance to facilitate standing for extended periods of time for 2-3 hours daily for household and community ambulation. Timeframe: 10-12 visits. (MET 8/6/24)  Long-Term Goals:  The patient will improve LE strength and endurance to facilitate walking distances of 1-2 mile(s) daily for household and community ambulation. Timeframe: 14-16 visits. (MET 8/6/24)  Patient will improve lower motor control to perform double-leg squat with symmetrical loading, without asymmetries, and with proper posterior chain loading to facilitate squatting and lifting ADL's. Timeframe: 16 visits. (IN PROGRESS 8/6/24)  Patient will improve LE mobility, strength, and single-leg stabilization to meet strength requirements for reciprocal stair navigation pattern with no asymmetries for ascending and descending 5 flights of stairs daily for community integration. Timeframe: 20 visits. (IN PROGRESS 8/6/24)  Patient will improve dynamic LE strength and stabilization to  facilitate running intermittent short distances for return to general health and wellness and for safety with crossing the street quickly, if needed. Timeframe:  24 visits. (IN PROGRESS 8/6/24)  Patient will improve lower quarter motor control to perform a single-leg squat on involved side with proper posterior chain loading and adequate trunk, hip and knee stabilization to facilitate normalized strength for safe return to sports participation. Timeframe: 28-30 visits. (IN PROGRESS 8/6/24)  Patient will improve LEFS score by at least 9 points to indicate a true change in improved function for ADL's and restoring PLF. Timeframe: 28-30 visits. (MET 8/6/24)    Plan: Continue to progress knee strength and stability.     Charges: Therex x 1, NMR x 1, Ther Act x 1  Total Timed Treatment: 40min    Total Treatment Time: 40min

## 2024-08-12 ENCOUNTER — APPOINTMENT (OUTPATIENT)
Dept: PHYSICAL THERAPY | Age: 19
End: 2024-08-12
Attending: NURSE PRACTITIONER
Payer: COMMERCIAL

## 2024-08-14 NOTE — PROGRESS NOTES
Date of Service: 08/15/2024  Dx: Rupture of posterior cruciate ligament of right knee, subsequent encounter (S83.521D)          DOS: 6/25/24      Insurance: [facility]  Insurance Limits: auth required  Visit #: 14  Authorized # of Visits: 20   POC/Auth Expiration: 9/24/24  Date of Last PN: 6/26/24 (Visit #1)  Authorizing Physician/Provider: Ben Diaz MD visit: 7/1/24  Fall Risk: Standard         Precautions: None            Subjective: Patient reports     Objective:     Pain/Symptom Presentation:    Pain at rest: 0/10  Pain at worst: 2/10     Outcomes Measure(s):  LEFS: 66.25% function (improved from 6.25% function)    Treatment:    Therapeutic Activities: x 23 min  Step-up: 2 x 10 (R), 8\" step   Step-down: 1 x 10 (R), 6\" step  Added Modified Pistol Squat/STS x 10 (R Bias)   Reverse slider lunges: 1 x 10 (B) - mild right knee pain during WB   Shuttle SLP: x 20 (B), 5 cords      Therapeutic Exercise: x 10 min  Added H/L Hip Abd RTB 2 x 15 ea R/L   Added S/L Clamshells x 10 5\" H ea R/L   DL bridging march: 1 x 10      Neuromuscular Re-education: x 8 min  Dead bug: 1 x 10     Assessment: Patient care this day emphasized hip strengthening/isolating hip abduction. Patient requires cues for reducing hip IR with all functional quad strengthening, but able to better correct by end of session. Patient demonstrated hip IR compensation most significantly during trial of staggered STS/modified pistol squat, was able to perform with contralateral heel propped.     Goals:   Short-Term Goals:  Patient will demonstrates knee flexion AROM to 90deg pain-free to facilitate sitting comfortably for 2 hours daily for sitting in a chair or at a desk. Timeframe: 4-6 visits. (MET 8/6/24)  Patient will improve knee flexion AROM to 120deg to allow for reciprocal stair navigation pattern for household ambulation. Timeframe: 6-8 visits.  Patient will improve knee strength and stability to facilitate discontinued use of AD  for daily ambulation. Timeframe: 8-10 visits. (MET 8/6/24)  The patient will improve quad strength and single-leg stance stability to demonstrate non-antalgic gait pattern with walking short-length distances for household ambulation. Timeframe: 10-12 visits. (MET 8/6/24)  The patient will improve LE strength and endurance to facilitate standing for extended periods of time for 2-3 hours daily for household and community ambulation. Timeframe: 10-12 visits. (MET 8/6/24)  Long-Term Goals:  The patient will improve LE strength and endurance to facilitate walking distances of 1-2 mile(s) daily for household and community ambulation. Timeframe: 14-16 visits. (MET 8/6/24)  Patient will improve lower motor control to perform double-leg squat with symmetrical loading, without asymmetries, and with proper posterior chain loading to facilitate squatting and lifting ADL's. Timeframe: 16 visits. (IN PROGRESS 8/6/24)  Patient will improve LE mobility, strength, and single-leg stabilization to meet strength requirements for reciprocal stair navigation pattern with no asymmetries for ascending and descending 5 flights of stairs daily for community integration. Timeframe: 20 visits. (IN PROGRESS 8/6/24)  Patient will improve dynamic LE strength and stabilization to facilitate running intermittent short distances for return to general health and wellness and for safety with crossing the street quickly, if needed. Timeframe:  24 visits. (IN PROGRESS 8/6/24)  Patient will improve lower quarter motor control to perform a single-leg squat on involved side with proper posterior chain loading and adequate trunk, hip and knee stabilization to facilitate normalized strength for safe return to sports participation. Timeframe: 28-30 visits. (IN PROGRESS 8/6/24)  Patient will improve LEFS score by at least 9 points to indicate a true change in improved function for ADL's and restoring PLF. Timeframe: 28-30 visits. (MET 8/6/24)    Plan: Patient  will be seen for 2x/week for an additional 4 weeks, for another 8 vists, over a 90 day period. We will re-evaluate the patient at that time in order to determine functional progress, evaluate short-term goal completion, and establish an updated plan of care. Possible treatment interventions will/may include: Therapeutic Activities, Therapeutic Exercise, Neuromuscular Re-education, Manual Therapy, Home Exercise Program Instruction, Patient Education, Self-Care/Home Management, Gait Training, and Modalities as needed.       Charges: Therex x 1, NMR x 1, Ther Act x 2  Total Timed Treatment: 41 min    Total Treatment Time: 41 min

## 2024-08-15 ENCOUNTER — OFFICE VISIT (OUTPATIENT)
Dept: PHYSICAL THERAPY | Age: 19
End: 2024-08-15
Attending: NURSE PRACTITIONER
Payer: COMMERCIAL

## 2024-08-15 PROCEDURE — 97110 THERAPEUTIC EXERCISES: CPT

## 2024-08-15 PROCEDURE — 97530 THERAPEUTIC ACTIVITIES: CPT

## 2024-08-15 PROCEDURE — 97112 NEUROMUSCULAR REEDUCATION: CPT

## 2024-08-19 ENCOUNTER — APPOINTMENT (OUTPATIENT)
Dept: PHYSICAL THERAPY | Age: 19
End: 2024-08-19
Attending: NURSE PRACTITIONER
Payer: COMMERCIAL

## 2024-08-20 ENCOUNTER — TELEPHONE (OUTPATIENT)
Dept: PHYSICAL THERAPY | Age: 19
End: 2024-08-20

## 2024-08-22 NOTE — PROGRESS NOTES
Date of Service: 08/26/2024  Dx: Rupture of posterior cruciate ligament of right knee, subsequent encounter (S83.521D)          DOS: 6/25/24      Insurance: [facility]  Insurance Limits: auth required  Visit #: 15  Authorized # of Visits: 20   POC/Auth Expiration: 9/24/24  Date of Last PN: 6/26/24 (Visit #1)  Authorizing Physician/Provider: Ben Diaz MD visit: 7/1/24  Fall Risk: Standard         Precautions: None            Subjective: Patient reports the only time she really has pain is when stretching her quads, and at times when on her feet too long/increased activity.     Objective:     Pain/Symptom Presentation:    Pain at rest: 0/10  Pain at worst: 1/10     Outcomes Measure(s):  LEFS: 66.25% function (improved from 6.25% function)    Treatment:    Therapeutic Activities: x 23 min    Added Resisted Sidestepping with Squatting RTB x 2 Laps   Step-up:  x 10 (R), 8\" step   Step-down: 1 x 10 (R), 8\" step *cues not to make sound with foot on floor*   Modified Pistol Squat/STS x 10 (R Bias)   Added TRX Squats x 10   Added Mini TRX Lateral Lunges x 10 ea R/L      Therapeutic Exercise: x 8 min    Added Standing Hip Abd RTB x 10 ea R/L   S/L Clamshells x 10 5\" H ea R/L   H/L Hip Abd RTB 2 x 15 ea R/L     Neuromuscular Re-education: x 8 min    Added SLS on Airex  2 x 30\" ea R/L   Added Standing TKE GrTB x 20 5\" H     Assessment: Patient progressed with more surgical leg bias strengthening this day, continued modified pistol squats/STS this day with emphasis on maintaining neutral hip rotation while ascending, was able to mostly correct. Patient also progressed with TRX squats and lateral lunges this day with cues for reducing hip drop compensation. Patient overall progressing well with stabilization/balance activities performed this day, would benefit from more focus of terminal knee extension strengthening.     Goals:   Short-Term Goals:  Patient will demonstrates knee flexion AROM to 90deg pain-free  to facilitate sitting comfortably for 2 hours daily for sitting in a chair or at a desk. Timeframe: 4-6 visits. (MET 8/6/24)  Patient will improve knee flexion AROM to 120deg to allow for reciprocal stair navigation pattern for household ambulation. Timeframe: 6-8 visits.  Patient will improve knee strength and stability to facilitate discontinued use of AD for daily ambulation. Timeframe: 8-10 visits. (MET 8/6/24)  The patient will improve quad strength and single-leg stance stability to demonstrate non-antalgic gait pattern with walking short-length distances for household ambulation. Timeframe: 10-12 visits. (MET 8/6/24)  The patient will improve LE strength and endurance to facilitate standing for extended periods of time for 2-3 hours daily for household and community ambulation. Timeframe: 10-12 visits. (MET 8/6/24)  Long-Term Goals:  The patient will improve LE strength and endurance to facilitate walking distances of 1-2 mile(s) daily for household and community ambulation. Timeframe: 14-16 visits. (MET 8/6/24)  Patient will improve lower motor control to perform double-leg squat with symmetrical loading, without asymmetries, and with proper posterior chain loading to facilitate squatting and lifting ADL's. Timeframe: 16 visits. (IN PROGRESS 8/6/24)  Patient will improve LE mobility, strength, and single-leg stabilization to meet strength requirements for reciprocal stair navigation pattern with no asymmetries for ascending and descending 5 flights of stairs daily for community integration. Timeframe: 20 visits. (IN PROGRESS 8/6/24)  Patient will improve dynamic LE strength and stabilization to facilitate running intermittent short distances for return to general health and wellness and for safety with crossing the street quickly, if needed. Timeframe:  24 visits. (IN PROGRESS 8/6/24)  Patient will improve lower quarter motor control to perform a single-leg squat on involved side with proper posterior chain  loading and adequate trunk, hip and knee stabilization to facilitate normalized strength for safe return to sports participation. Timeframe: 28-30 visits. (IN PROGRESS 8/6/24)  Patient will improve LEFS score by at least 9 points to indicate a true change in improved function for ADL's and restoring PLF. Timeframe: 28-30 visits. (MET 8/6/24)    Plan: Patient will be seen for 2x/week for an additional 4 weeks, for another 8 vists, over a 90 day period. We will re-evaluate the patient at that time in order to determine functional progress, evaluate short-term goal completion, and establish an updated plan of care. Possible treatment interventions will/may include: Therapeutic Activities, Therapeutic Exercise, Neuromuscular Re-education, Manual Therapy, Home Exercise Program Instruction, Patient Education, Self-Care/Home Management, Gait Training, and Modalities as needed.       Charges: Therex x 1, NMR x 1, Ther Act x 2  Total Timed Treatment: 38 min    Total Treatment Time: 38 min

## 2024-08-26 ENCOUNTER — OFFICE VISIT (OUTPATIENT)
Dept: PHYSICAL THERAPY | Age: 19
End: 2024-08-26
Attending: ORTHOPAEDIC SURGERY
Payer: COMMERCIAL

## 2024-08-26 PROCEDURE — 97530 THERAPEUTIC ACTIVITIES: CPT

## 2024-08-26 PROCEDURE — 97110 THERAPEUTIC EXERCISES: CPT

## 2024-08-26 PROCEDURE — 97112 NEUROMUSCULAR REEDUCATION: CPT

## 2024-08-29 NOTE — PROGRESS NOTES
Date of Service: 08/30/2024  Dx: Rupture of posterior cruciate ligament of right knee, subsequent encounter (S83.521D)          DOS: 6/25/24      Insurance: [facility]  Insurance Limits: auth required  Visit #: 16  Authorized # of Visits: 20   POC/Auth Expiration: 9/24/24  Date of Last PN: 6/26/24 (Visit #1)  Authorizing Physician/Provider: Ben Diaz MD visit: 7/1/24  Fall Risk: Standard         Precautions: None            Subjective: Patient reports she is able to bend her knee as much as her other leg behind her butt, but does still feel a little tighter, and stiff.     Objective:     Pain/Symptom Presentation:    Pain at rest: 0/10  Pain at worst: 1/10     Outcomes Measure(s):  LEFS: 66.25% function (improved from 6.25% function)    Treatment:    Therapeutic Activities: x 23 min    Resisted Sidestepping with Squatting RTB x 2 Laps (Band @ Knees)  Added Frw Resisted Walk x 1 Lap RTB (Band @ Knees)   Modified Pistol Squat/STS x 10 (R Bias) -- able to reduce hip IR without foot comp  TRX Squats x 10   Mini TRX Lateral Lunges x 10 ea R/L    Step-up: 2 x 10 (R), 8\" step   Step-down: 2 x 10 (R), 8\" step *cues not to make sound with foot on floor*     Therapeutic Exercise: x 8 min    S/L Clamshells x 10 5\" H ea R/L   H/L Hip Abd RTB 2 x 15 ea R/L     Neuromuscular Re-education: x 10 min    SLS on Airex  2 x 30\" ea R/L   Standing TKE GrTB x 20 5\" H ** emphasize weakness range only     Assessment: Patient continues to demonstrate improvements in gluteal activation with closed chain knee flexion exercises, demonstrating less hip IR compensations, but still requires some cueing to avoid. Patient still with some difficulty to achieve equal WB squats, required both visual and tactile cues for increasing shift to right side. Patient able to better perform all functional strengthening and balance activities this day compared to previous, would benefit from continued focus on specific ranges for quad  strengthening (modified TKE in standing to weak ranges only) and gluteal strengthening     Goals:   Short-Term Goals:  Patient will demonstrates knee flexion AROM to 90deg pain-free to facilitate sitting comfortably for 2 hours daily for sitting in a chair or at a desk. Timeframe: 4-6 visits. (MET 8/6/24)  Patient will improve knee flexion AROM to 120deg to allow for reciprocal stair navigation pattern for household ambulation. Timeframe: 6-8 visits.  Patient will improve knee strength and stability to facilitate discontinued use of AD for daily ambulation. Timeframe: 8-10 visits. (MET 8/6/24)  The patient will improve quad strength and single-leg stance stability to demonstrate non-antalgic gait pattern with walking short-length distances for household ambulation. Timeframe: 10-12 visits. (MET 8/6/24)  The patient will improve LE strength and endurance to facilitate standing for extended periods of time for 2-3 hours daily for household and community ambulation. Timeframe: 10-12 visits. (MET 8/6/24)  Long-Term Goals:  The patient will improve LE strength and endurance to facilitate walking distances of 1-2 mile(s) daily for household and community ambulation. Timeframe: 14-16 visits. (MET 8/6/24)  Patient will improve lower motor control to perform double-leg squat with symmetrical loading, without asymmetries, and with proper posterior chain loading to facilitate squatting and lifting ADL's. Timeframe: 16 visits. (IN PROGRESS 8/6/24)  Patient will improve LE mobility, strength, and single-leg stabilization to meet strength requirements for reciprocal stair navigation pattern with no asymmetries for ascending and descending 5 flights of stairs daily for community integration. Timeframe: 20 visits. (IN PROGRESS 8/6/24)  Patient will improve dynamic LE strength and stabilization to facilitate running intermittent short distances for return to general health and wellness and for safety with crossing the street  quickly, if needed. Timeframe:  24 visits. (IN PROGRESS 8/6/24)  Patient will improve lower quarter motor control to perform a single-leg squat on involved side with proper posterior chain loading and adequate trunk, hip and knee stabilization to facilitate normalized strength for safe return to sports participation. Timeframe: 28-30 visits. (IN PROGRESS 8/6/24)  Patient will improve LEFS score by at least 9 points to indicate a true change in improved function for ADL's and restoring PLF. Timeframe: 28-30 visits. (MET 8/6/24)    Plan: Patient will be seen for 2x/week for an additional 4 weeks, for another 8 vists, over a 90 day period. We will re-evaluate the patient at that time in order to determine functional progress, evaluate short-term goal completion, and establish an updated plan of care. Possible treatment interventions will/may include: Therapeutic Activities, Therapeutic Exercise, Neuromuscular Re-education, Manual Therapy, Home Exercise Program Instruction, Patient Education, Self-Care/Home Management, Gait Training, and Modalities as needed.       Charges: Therex x 1, NMR x 1, Ther Act x 2  Total Timed Treatment: 41 min    Total Treatment Time: 41 min

## 2024-08-30 ENCOUNTER — OFFICE VISIT (OUTPATIENT)
Dept: PHYSICAL THERAPY | Age: 19
End: 2024-08-30
Attending: ORTHOPAEDIC SURGERY
Payer: COMMERCIAL

## 2024-08-30 PROCEDURE — 97112 NEUROMUSCULAR REEDUCATION: CPT

## 2024-08-30 PROCEDURE — 97530 THERAPEUTIC ACTIVITIES: CPT

## 2024-08-30 PROCEDURE — 97110 THERAPEUTIC EXERCISES: CPT

## 2024-09-03 NOTE — PROGRESS NOTES
Date of Service: 09/04/2024  Dx: Rupture of posterior cruciate ligament of right knee, subsequent encounter (S83.521D)          DOS: 6/25/24      Insurance: [facility]  Insurance Limits: auth required  Visit #: 17  Authorized # of Visits: 20   POC/Auth Expiration: 9/24/24  Date of Last PN: 6/26/24 (Visit #1)  Authorizing Physician/Provider: Ben Diaz MD visit: 7/1/24  Fall Risk: Standard         Precautions: None            Subjective: Patient reports she has noted more mild improvements in stiffness, does not have as much discomfort         Patient reports she is able to bend her knee as much as her other leg behind her butt, but does still feel a little tighter, and stiff.     Objective:     Pain/Symptom Presentation:    Pain at rest: 0/10  Pain at worst: 1/10     Outcomes Measure(s):  LEFS: 66.25% function (improved from 6.25% function)    Treatment:    Therapeutic Activities: x 25 min    Resisted Sidestepping with Squatting RTB x 2 Laps (Band @ Knees)  Frwd Resisted Walk x 2 Lap RTB (Band @ Knees)   Added 4 Way Resisted Walk Aqua Power Band x 5 each Dir   Alternating Mini TRX Lateral Lunges x 10 ea R/L    Added Modified TRX Pistol Squat x 10 ea R/L     Therapeutic Exercise: x 5 min    Added S/L Kicks A/P x 10 ea R/L     Neuromuscular Re-education: x 10 min    SLS on Airex  2 x 30\" ea R/L (with contralateral LE hip/knee flexed 90/90)  Standing TKE GrTB x 20 5\" H ** emphasize weakness range only ea R/L     Assessment: Patient still with some hip IR compensations related to weakness, progressed hooklying and side-lying hip abduction to a sustained hold in neutral hip abduction with subtle/controlled kicks forward and back. Patient also instructed through progression of mini pistol squats with TRX from single leg STS, and trial of 4 way resisted walks. Patient did struggle more with resisted walks but was able to perform all new exercises without pain. Patient would benefit from reassessment with  supervising PT next visit.     Goals:   Short-Term Goals:  Patient will demonstrates knee flexion AROM to 90deg pain-free to facilitate sitting comfortably for 2 hours daily for sitting in a chair or at a desk. Timeframe: 4-6 visits. (MET 8/6/24)  Patient will improve knee flexion AROM to 120deg to allow for reciprocal stair navigation pattern for household ambulation. Timeframe: 6-8 visits.  Patient will improve knee strength and stability to facilitate discontinued use of AD for daily ambulation. Timeframe: 8-10 visits. (MET 8/6/24)  The patient will improve quad strength and single-leg stance stability to demonstrate non-antalgic gait pattern with walking short-length distances for household ambulation. Timeframe: 10-12 visits. (MET 8/6/24)  The patient will improve LE strength and endurance to facilitate standing for extended periods of time for 2-3 hours daily for household and community ambulation. Timeframe: 10-12 visits. (MET 8/6/24)  Long-Term Goals:  The patient will improve LE strength and endurance to facilitate walking distances of 1-2 mile(s) daily for household and community ambulation. Timeframe: 14-16 visits. (MET 8/6/24)  Patient will improve lower motor control to perform double-leg squat with symmetrical loading, without asymmetries, and with proper posterior chain loading to facilitate squatting and lifting ADL's. Timeframe: 16 visits. (IN PROGRESS 8/6/24)  Patient will improve LE mobility, strength, and single-leg stabilization to meet strength requirements for reciprocal stair navigation pattern with no asymmetries for ascending and descending 5 flights of stairs daily for community integration. Timeframe: 20 visits. (IN PROGRESS 8/6/24)  Patient will improve dynamic LE strength and stabilization to facilitate running intermittent short distances for return to general health and wellness and for safety with crossing the street quickly, if needed. Timeframe:  24 visits. (IN PROGRESS  8/6/24)  Patient will improve lower quarter motor control to perform a single-leg squat on involved side with proper posterior chain loading and adequate trunk, hip and knee stabilization to facilitate normalized strength for safe return to sports participation. Timeframe: 28-30 visits. (IN PROGRESS 8/6/24)  Patient will improve LEFS score by at least 9 points to indicate a true change in improved function for ADL's and restoring PLF. Timeframe: 28-30 visits. (MET 8/6/24)    Plan: Patient will be seen for 2x/week for an additional 4 weeks, for another 8 vists, over a 90 day period. We will re-evaluate the patient at that time in order to determine functional progress, evaluate short-term goal completion, and establish an updated plan of care. Possible treatment interventions will/may include: Therapeutic Activities, Therapeutic Exercise, Neuromuscular Re-education, Manual Therapy, Home Exercise Program Instruction, Patient Education, Self-Care/Home Management, Gait Training, and Modalities as needed.       Charges: Ther Act x 2, Ther Ex x 1  Total Timed Treatment: 40 min    Total Treatment Time: 40 min

## 2024-09-04 ENCOUNTER — OFFICE VISIT (OUTPATIENT)
Dept: PHYSICAL THERAPY | Age: 19
End: 2024-09-04
Attending: ORTHOPAEDIC SURGERY
Payer: COMMERCIAL

## 2024-09-04 PROCEDURE — 97110 THERAPEUTIC EXERCISES: CPT

## 2024-09-04 PROCEDURE — 97530 THERAPEUTIC ACTIVITIES: CPT

## 2024-09-05 NOTE — PROGRESS NOTES
PHYSICAL THERAPY RE-EVALUATION:      Date of Service: 9/6/2024  Dx: Rupture of posterior cruciate ligament of right knee, subsequent encounter (S83.524U)     DOS: 6/25/24      Insurance: [facility]  Insurance Limits: auth required  Visit #: 18  Authorized # of Visits: 20   POC/Auth Expiration: 9/24/24  Date of Last PN: 8/6/24 (Visit #12)  Authorizing Physician/Provider: Ben Diaz MD visit: 7/1/24  Fall Risk: Standard         Precautions: None            Subjective: The patient reports that she has been constantly bending her knee all the time and she feels that has been helping. The patient reports that her remaining complaints are the residual stiffness after being in one position for a period of time. The patient denies any issues with standing, sitting walking, or navigating stairs. The patient has not yet done any running.     The patient reports that she feels she is 60-70% recovered.     Objective:     Pain/Symptom Presentation:    Pain at rest: 0/10  Pain at worst: 0/10    Outcomes Measure(s):  LEFS: 78.75% function (improved from 66.25% function)     Activity Measures:  Sleeping: No Activity Limitation: Patient is able to sleep without limitations.  Sitting: No Activity Limitation: Patient is only able to sit without limitations.  Standing: No Activity Limitation: Patient is able to stand without limitations.   Car Transfers: No Activity Limitation: Patient is able to perform car transfers safely and independently.   Walking: No Activity Limitation: Patient is only able to walk up to 3 miles.   Driving: No Activity Limitation: Patient is unable to drive.   Ascending Stairs: No Activity Limitation: Patient navigates up the stairs with step-over gait pattern, no deviations.   Descending Stairs: No Activity Limitation: Patient navigates down the stairs with step-over gait pattern, no deviations.   Stairs: Mild Activity Limitation: Patient is able to navigate 3 flights of stairs at a time.    Running Short Distances: Extreme Activity Limitation: Patient is unable to run.     ROM:               Knee Motion PROM AROM    Right Left Right Left   Knee Extension 0 0 0 0   Knee Flexion 150 150 145 145   *indicates activity was associated with pain     Strength/MMT:           Knee Motion Strength    Right Left   Knee Extension 4/5 5/5   Knee Flexion 4-/5 4+/5   *indicates activity was associated with pain             Hip Motion Strength    Right Left   Hip Flexion 5/5 5/5   Hip Extension 4-/5 4+/5   Hip ABD 4-/5 4+/5   *indicates activity was associated with pain       Treatment:    Therapeutic Activities: x 12min  Elevated SL squat/lat heel tap: 1 x 10 (B) 6\" step; 1 x 10 (B), 8\" step - cues for valgus knee collapse   Lateral slider lunges: 2 x 10 (B)   SL butt taps to hi-low table: 2 x 8 (B)     Therapeutic Exercise: x 25min  Discussed the patient's remaining symptoms, functional limitations, and concerns to establish updated POC.  SL bridging: 2 x 10 (B)   DL SB bridge HSC: 2 x 10 with RSB (55cm)  Elastic band lateral walk: 2 laps  x 15 steps, red band around both knees and ankles   Elastic band base position fwd walk: 2 laps  x 10 steps (B), red band around both knees and ankles     Neuromuscular Re-education: x 3min  Lateral band chop: 1 x 10 (B), orange power band     Assessment: Arianna is a 18 year old female that presents to physical therapy evaluation s/p (R) PCL reconstruction 6/25/24 by Dr. Contreras. Her injury occurred when she fell on her right knee while she was ice skating. The patient is now 10 weeks out of surgery and has been seen for 17 sessions in physical therapy for treatment including manual therapy for knee mobility as well as exercises for progression in mobility, endurance, strength, and stability at her left knee. The patient demonstrates normalized knee ROM measurements, though is still with some mild end-range complaints of stiffness at her knee. The patient is still with some remaining  strength deficits at her right knee compared to her left that we would like to continue to address in PT. The patient is still with some remaining dynamic knee stabilization deficits at her right knee, but is able to correct when cued. The patient would benefit from continued PT for further progression in strength and stability for full return to PLF.     Goals:   Short-Term Goals:  Patient will demonstrates knee flexion AROM to 90deg pain-free to facilitate sitting comfortably for 2 hours daily for sitting in a chair or at a desk. Timeframe: 4-6 visits. (MET 8/6/24)  Patient will improve knee flexion AROM to 120deg to allow for reciprocal stair navigation pattern for household ambulation. Timeframe: 6-8 visits.  Patient will improve knee strength and stability to facilitate discontinued use of AD for daily ambulation. Timeframe: 8-10 visits. (MET 8/6/24)  The patient will improve quad strength and single-leg stance stability to demonstrate non-antalgic gait pattern with walking short-length distances for household ambulation. Timeframe: 10-12 visits. (MET 8/6/24)  The patient will improve LE strength and endurance to facilitate standing for extended periods of time for 2-3 hours daily for household and community ambulation. Timeframe: 10-12 visits. (MET 8/6/24)  Long-Term Goals:  The patient will improve LE strength and endurance to facilitate walking distances of 1-2 mile(s) daily for household and community ambulation. Timeframe: 14-16 visits. (MET 8/6/24)  Patient will improve lower motor control to perform double-leg squat with symmetrical loading, without asymmetries, and with proper posterior chain loading to facilitate squatting and lifting ADL's. Timeframe: 16 visits. (MET 9/6/24)  Patient will improve LE mobility, strength, and single-leg stabilization to meet strength requirements for reciprocal stair navigation pattern with no asymmetries for ascending and descending 5 flights of stairs daily for  community integration. Timeframe: 20 visits. (MET 9/6/24)  Patient will improve dynamic LE strength and stabilization to facilitate running intermittent short distances for return to general health and wellness and for safety with crossing the street quickly, if needed. Timeframe:  24 visits. (IN PROGRESS 9/6/24)  Patient will improve lower quarter motor control to perform a single-leg squat on involved side with proper posterior chain loading and adequate trunk, hip and knee stabilization to facilitate normalized strength for safe return to sports participation. Timeframe: 28-30 visits. (IN PROGRESS 9/6/24)  Patient will improve LEFS score by at least 9 points to indicate a true change in improved function for ADL's and restoring PLF. Timeframe: 28-30 visits. (MET 8/6/24)    Plan: Patient will be seen for 2x/week for an additional 4 weeks, for another 8 vists, over a 90 day period. We will re-evaluate the patient at that time in order to determine functional progress, evaluate short-term goal completion, and establish an updated plan of care. Possible treatment interventions will/may include: Therapeutic Activities, Therapeutic Exercise, Neuromuscular Re-education, Manual Therapy, Home Exercise Program Instruction, Patient Education, Self-Care/Home Management, Gait Training, and Modalities as needed.       Charges: Ther Act x 1, Ther Ex x 2  Total Timed Treatment: 40 min    Total Treatment Time: 40 min

## 2024-09-06 ENCOUNTER — OFFICE VISIT (OUTPATIENT)
Dept: PHYSICAL THERAPY | Age: 19
End: 2024-09-06
Attending: ORTHOPAEDIC SURGERY
Payer: COMMERCIAL

## 2024-09-06 PROCEDURE — 97530 THERAPEUTIC ACTIVITIES: CPT

## 2024-09-06 PROCEDURE — 97110 THERAPEUTIC EXERCISES: CPT

## 2024-09-09 ENCOUNTER — TELEPHONE (OUTPATIENT)
Dept: PHYSICAL THERAPY | Facility: HOSPITAL | Age: 19
End: 2024-09-09

## 2024-09-09 ENCOUNTER — APPOINTMENT (OUTPATIENT)
Dept: PHYSICAL THERAPY | Age: 19
End: 2024-09-09
Attending: ORTHOPAEDIC SURGERY
Payer: COMMERCIAL

## 2024-09-12 NOTE — PROGRESS NOTES
Date of Service: 09/13/2024  Dx: Rupture of posterior cruciate ligament of right knee, subsequent encounter (S83.521D)     DOS: 6/25/24      Insurance: [facility]  Insurance Limits: auth required  Visit #: 19 ask for auth next visit**  Authorized # of Visits: 20   POC/Auth Expiration: 9/24/24  Date of Last PN: 8/6/24 (Visit #12)  Authorizing Physician/Provider: Ben Diaz MD visit: 7/1/24  Fall Risk: Standard         Precautions: None         Subjective: Patient reports she still notes some stiffness with end range knee flexion, mostly when waking up first thing and if she tries to squat/bend over.     Objective:     Pain/Symptom Presentation:    Pain at rest: 0/10  Pain at worst: 0/10    Outcomes Measure(s):  LEFS: 78.75% function (improved from 66.25% function)     Activity Measures:  Sleeping: No Activity Limitation: Patient is able to sleep without limitations.  Sitting: No Activity Limitation: Patient is only able to sit without limitations.  Standing: No Activity Limitation: Patient is able to stand without limitations.   Car Transfers: No Activity Limitation: Patient is able to perform car transfers safely and independently.   Walking: No Activity Limitation: Patient is only able to walk up to 3 miles.   Driving: No Activity Limitation: Patient is unable to drive.   Ascending Stairs: No Activity Limitation: Patient navigates up the stairs with step-over gait pattern, no deviations.   Descending Stairs: No Activity Limitation: Patient navigates down the stairs with step-over gait pattern, no deviations.   Stairs: Mild Activity Limitation: Patient is able to navigate 3 flights of stairs at a time.   Running Short Distances: Extreme Activity Limitation: Patient is unable to run.     ROM:               Knee Motion PROM AROM    Right Left Right Left   Knee Extension 0 0 0 0   Knee Flexion 150 150 145 145   *indicates activity was associated with pain     Strength/MMT:           Knee Motion  Strength    Right Left   Knee Extension 4/5 5/5   Knee Flexion 4-/5 4+/5   *indicates activity was associated with pain             Hip Motion Strength    Right Left   Hip Flexion 5/5 5/5   Hip Extension 4-/5 4+/5   Hip ABD 4-/5 4+/5   *indicates activity was associated with pain       Treatment:    Therapeutic Activities: x 10 min  Lateral slider lunges: 2 x 10 (B)   Added Shuttle Press (DL) 2 x 10, 5 cords cues to reduce knee valgus (B) 120 degrees   Added Shuttle Press (SL) x 10 (R) 4 cords     Therapeutic Exercise: x 30 min  Standing Hip Ext 2 x 10 ea R/L  Standing Hip Abduction 2 x 10 ea R/L   Prone Hip Ext x 10 ea R/L   SL bridging: 2 x 10 (B)   DL SB bridge HSC: 2 x 10 with RSB (55cm)  Elastic band lateral walk: 2 laps  x 15 steps, red band around both knees and ankles   Elastic band base position fwd walk: 2 laps  x 10 steps (B), red band around both knees and ankles   Added Seated HS Mob w Ball x 20 (R) --- medial HS improved end range knee flexion tolerated end of tx     Assessment: Continued patient with gluteal strength focus, still some struggle noted to reduce valgus with all resisted walking this day but able to better correct this day. Patient did not demonstrate changes to end range flexion stiffness following trial of shuttle press single and double leg press in 120 degrees, responded most to self mobilization with tennis ball to medial HS this day. Plan to address with more manual of medial HS/adductors next visit.     Goals:   Short-Term Goals:  Patient will demonstrates knee flexion AROM to 90deg pain-free to facilitate sitting comfortably for 2 hours daily for sitting in a chair or at a desk. Timeframe: 4-6 visits. (MET 8/6/24)  Patient will improve knee flexion AROM to 120deg to allow for reciprocal stair navigation pattern for household ambulation. Timeframe: 6-8 visits.  Patient will improve knee strength and stability to facilitate discontinued use of AD for daily ambulation. Timeframe:  8-10 visits. (MET 8/6/24)  The patient will improve quad strength and single-leg stance stability to demonstrate non-antalgic gait pattern with walking short-length distances for household ambulation. Timeframe: 10-12 visits. (MET 8/6/24)  The patient will improve LE strength and endurance to facilitate standing for extended periods of time for 2-3 hours daily for household and community ambulation. Timeframe: 10-12 visits. (MET 8/6/24)  Long-Term Goals:  The patient will improve LE strength and endurance to facilitate walking distances of 1-2 mile(s) daily for household and community ambulation. Timeframe: 14-16 visits. (MET 8/6/24)  Patient will improve lower motor control to perform double-leg squat with symmetrical loading, without asymmetries, and with proper posterior chain loading to facilitate squatting and lifting ADL's. Timeframe: 16 visits. (MET 9/6/24)  Patient will improve LE mobility, strength, and single-leg stabilization to meet strength requirements for reciprocal stair navigation pattern with no asymmetries for ascending and descending 5 flights of stairs daily for community integration. Timeframe: 20 visits. (MET 9/6/24)  Patient will improve dynamic LE strength and stabilization to facilitate running intermittent short distances for return to general health and wellness and for safety with crossing the street quickly, if needed. Timeframe:  24 visits. (IN PROGRESS 9/6/24)  Patient will improve lower quarter motor control to perform a single-leg squat on involved side with proper posterior chain loading and adequate trunk, hip and knee stabilization to facilitate normalized strength for safe return to sports participation. Timeframe: 28-30 visits. (IN PROGRESS 9/6/24)  Patient will improve LEFS score by at least 9 points to indicate a true change in improved function for ADL's and restoring PLF. Timeframe: 28-30 visits. (MET 8/6/24)    Plan: Patient will be seen for 2x/week for an additional 4  weeks, for another 8 vists, over a 90 day period. We will re-evaluate the patient at that time in order to determine functional progress, evaluate short-term goal completion, and establish an updated plan of care. Possible treatment interventions will/may include: Therapeutic Activities, Therapeutic Exercise, Neuromuscular Re-education, Manual Therapy, Home Exercise Program Instruction, Patient Education, Self-Care/Home Management, Gait Training, and Modalities as needed.       Charges: Ther Act x 1, Ther Ex x 2  Total Timed Treatment: 40 min    Total Treatment Time: 40 min

## 2024-09-13 ENCOUNTER — OFFICE VISIT (OUTPATIENT)
Dept: PHYSICAL THERAPY | Age: 19
End: 2024-09-13
Attending: ORTHOPAEDIC SURGERY
Payer: COMMERCIAL

## 2024-09-13 PROCEDURE — 97530 THERAPEUTIC ACTIVITIES: CPT

## 2024-09-13 PROCEDURE — 97110 THERAPEUTIC EXERCISES: CPT

## 2024-09-16 ENCOUNTER — APPOINTMENT (OUTPATIENT)
Dept: PHYSICAL THERAPY | Age: 19
End: 2024-09-16
Attending: ORTHOPAEDIC SURGERY
Payer: COMMERCIAL

## 2024-09-20 ENCOUNTER — HOSPITAL ENCOUNTER (OUTPATIENT)
Dept: MRI IMAGING | Facility: HOSPITAL | Age: 19
Discharge: HOME | End: 2024-09-20
Attending: NURSE PRACTITIONER
Payer: COMMERCIAL

## 2024-09-20 ENCOUNTER — OFFICE VISIT (OUTPATIENT)
Dept: PHYSICAL THERAPY | Age: 19
End: 2024-09-20
Attending: ORTHOPAEDIC SURGERY
Payer: COMMERCIAL

## 2024-09-20 ENCOUNTER — TELEPHONE (OUTPATIENT)
Dept: FAMILY MEDICINE CLINIC | Facility: CLINIC | Age: 19
End: 2024-09-20

## 2024-09-20 DIAGNOSIS — G89.29 CHRONIC THORACIC SPINE PAIN: ICD-10-CM

## 2024-09-20 DIAGNOSIS — M54.6 CHRONIC THORACIC SPINE PAIN: ICD-10-CM

## 2024-09-20 PROCEDURE — 72146 MRI CHEST SPINE W/O DYE: CPT | Performed by: NURSE PRACTITIONER

## 2024-09-20 PROCEDURE — 97110 THERAPEUTIC EXERCISES: CPT

## 2024-09-20 PROCEDURE — 97140 MANUAL THERAPY 1/> REGIONS: CPT

## 2024-09-20 NOTE — PROGRESS NOTES
Date of Service: 09/13/2024  Dx: Rupture of posterior cruciate ligament of right knee, subsequent encounter (S83.521D)   DOS: 6/25/24      Insurance: [facility]  Insurance Limits: auth required  Visit #: 20  Authorized # of Visits: 25  POC/Auth Expiration: 11/18/24  Date of Last PN: 9/6/24 (Visit #18)  Authorizing Physician/Provider: Ben Diaz MD visit: 7/1/24  Fall Risk: Standard         Precautions: None           Subjective: The patient arrives today with a headache. The patient reports improved mobility. She reports that the strength is not quite the same. She notices it most when she squats/crouches. The patient reports that she hasn't specifically tried running, but has run short distances in certain situations.     Objective:     Pain/Symptom Presentation:    Pain at rest: 0/10  Pain at worst: 0/10     Treatment:    Therapeutic Activities: x 10 min  Goblet squat: 2 x 10, 15# KB - symmetrical loading   Lateral slider lunges: 2 x 10 (B)   Elevated SL squat: 2 x 10 (B), 8\" step     Therapeutic Exercise: x 20min  Manual supine hamstring stretch: x 30\" (B)   Manual hip flexor stretch (supine) with leg off EOB: x 30\" (B)    SL bridging: 2 x 10 (B)   DL SB bridge HSC: 2 x 10 with RSB (55cm)  Elastic band lateral walk: 2 laps  x 15 steps, red band around both knees and ankles   Elastic band base position fwd walk: 2 laps  x 20 steps (B), red band around both knees and ankles     Manual Therapy: x 10min  Soft tissue mobilization (prone): (R) hamstrings, calf, posterior knee    Assessment: Arianna is progressing well and is tolerating her ADL's well. She still notes some mild end-range knee flexion deficits with deep squatting and crouching which she was educated may take additional time. She reports her remaining primary concern is still strength deficits on her involved side. We continue to work on progressive LE strengthening. She shows symmetrical loading even with weighted squatting but still  demonstrates some strength deficits with SL squatting exercises.     Goals:   Short-Term Goals:  Patient will demonstrates knee flexion AROM to 90deg pain-free to facilitate sitting comfortably for 2 hours daily for sitting in a chair or at a desk. Timeframe: 4-6 visits. (MET 8/6/24)  Patient will improve knee flexion AROM to 120deg to allow for reciprocal stair navigation pattern for household ambulation. Timeframe: 6-8 visits.  Patient will improve knee strength and stability to facilitate discontinued use of AD for daily ambulation. Timeframe: 8-10 visits. (MET 8/6/24)  The patient will improve quad strength and single-leg stance stability to demonstrate non-antalgic gait pattern with walking short-length distances for household ambulation. Timeframe: 10-12 visits. (MET 8/6/24)  The patient will improve LE strength and endurance to facilitate standing for extended periods of time for 2-3 hours daily for household and community ambulation. Timeframe: 10-12 visits. (MET 8/6/24)  Long-Term Goals:  The patient will improve LE strength and endurance to facilitate walking distances of 1-2 mile(s) daily for household and community ambulation. Timeframe: 14-16 visits. (MET 8/6/24)  Patient will improve lower motor control to perform double-leg squat with symmetrical loading, without asymmetries, and with proper posterior chain loading to facilitate squatting and lifting ADL's. Timeframe: 16 visits. (MET 9/6/24)  Patient will improve LE mobility, strength, and single-leg stabilization to meet strength requirements for reciprocal stair navigation pattern with no asymmetries for ascending and descending 5 flights of stairs daily for community integration. Timeframe: 20 visits. (MET 9/6/24)  Patient will improve dynamic LE strength and stabilization to facilitate running intermittent short distances for return to general health and wellness and for safety with crossing the street quickly, if needed. Timeframe:  24 visits.  (IN PROGRESS 9/6/24)  Patient will improve lower quarter motor control to perform a single-leg squat on involved side with proper posterior chain loading and adequate trunk, hip and knee stabilization to facilitate normalized strength for safe return to sports participation. Timeframe: 28-30 visits. (IN PROGRESS 9/6/24)  Patient will improve LEFS score by at least 9 points to indicate a true change in improved function for ADL's and restoring PLF. Timeframe: 28-30 visits. (MET 8/6/24)    Plan: Continue to progress quad strengthening on involved LE, focusing on SL bias.       Charges: MT x 1, Therex x 2  Total Timed Treatment: 40min    Total Treatment Time: 40min

## 2024-09-20 NOTE — TELEPHONE ENCOUNTER
----- Message from Sherrell Zheng sent at 9/20/2024 11:05 AM CDT -----  Results reviewed.   Normal thoracic MRI

## 2024-10-02 ENCOUNTER — TELEPHONE (OUTPATIENT)
Dept: PHYSICAL THERAPY | Age: 19
End: 2024-10-02

## 2024-10-04 ENCOUNTER — OFFICE VISIT (OUTPATIENT)
Dept: PHYSICAL THERAPY | Age: 19
End: 2024-10-04
Attending: ORTHOPAEDIC SURGERY
Payer: COMMERCIAL

## 2024-10-04 PROCEDURE — 97530 THERAPEUTIC ACTIVITIES: CPT

## 2024-10-04 PROCEDURE — 97110 THERAPEUTIC EXERCISES: CPT

## 2024-10-04 PROCEDURE — 97140 MANUAL THERAPY 1/> REGIONS: CPT

## 2024-10-04 PROCEDURE — 97112 NEUROMUSCULAR REEDUCATION: CPT

## 2024-10-04 NOTE — PROGRESS NOTES
Date of Service: 10/04/2024  Dx: Rupture of posterior cruciate ligament of right knee, subsequent encounter (S83.521D)   DOS: 6/25/24      Insurance: [facility]  Insurance Limits: auth required  Visit #: 21  Authorized # of Visits: 25  POC/Auth Expiration: 11/18/24  Date of Last PN: 9/6/24 (Visit #18)  Authorizing Physician/Provider: Ben Diaz MD visit: 7/1/24  Fall Risk: Standard         Precautions: None           Subjective: The patient arrives today with a headache. The patient reports improved mobility. She reports that the strength is not quite the same. She notices it most when she squats/crouches. The patient reports that she hasn't specifically tried running, but has run short distances in certain situations.     Objective:     Pain/Symptom Presentation:    Pain at rest: 0/10  Pain at worst: 0/10     Treatment:    Therapeutic Activities: x 8 min  Goblet squat: 2 x 10, 20# KB - symmetrical loading (added butt tap to high low table)  Lateral slider lunges: 2 x 10 (B) *cues for 3\" concentric 3\" ecc for control    Therapeutic Exercise: x 15 min  Elastic band lateral walk: 2 laps  x 15 steps, red band around both knees and ankles   Elastic band base position fwd walk: 2 laps  x 20 steps (B), red band around both knees and ankles   Added 1/2 Kneeling Airex Adductor Stretch 30\" ea R/L   Seated HS Mob with Ball x 20 (R)  Standing HS Stretch 20\" ea R/L    Neuro: 8'  SLS Airex 30\" ea R/L  BOSU SLS 30\" ea R/L  Cone Alternating Taps in SLS x 5 each hand     NP  Manual hip flexor stretch (supine) with leg off EOB: x 30\" (B)    SL bridging: 2 x 10 (B)   DL SB bridge HSC: 2 x 10 with RSB (55cm)    Manual Therapy: x 8 min  Soft tissue mobilization/Cupping (prone): (R) distal hamstrings, Medial Knee     Assessment: Patient continues to report stiffness in end range knee flexion with deep squatting, crouching, and if she sleeps with it bent/is in flexed position for long duration. Patient continues to  benefit from exercises that focus on hip and knee mechanics with squatting, lunging, and walking but still with some difficulty to maintain neutral hip still present. Patient began some uneven single leg balance and with dynamic UE movement this day, demonstrated some difficulty with SLS on BOSU but performed well with SLS with cone taps on floor. Patient would benefit from continued efforts to return to more higher level activity as needed, goal to be able to return to walking for any period of time without pain, and run without pain.    Goals:   Short-Term Goals:  Patient will demonstrates knee flexion AROM to 90deg pain-free to facilitate sitting comfortably for 2 hours daily for sitting in a chair or at a desk. Timeframe: 4-6 visits. (MET 8/6/24)  Patient will improve knee flexion AROM to 120deg to allow for reciprocal stair navigation pattern for household ambulation. Timeframe: 6-8 visits.  Patient will improve knee strength and stability to facilitate discontinued use of AD for daily ambulation. Timeframe: 8-10 visits. (MET 8/6/24)  The patient will improve quad strength and single-leg stance stability to demonstrate non-antalgic gait pattern with walking short-length distances for household ambulation. Timeframe: 10-12 visits. (MET 8/6/24)  The patient will improve LE strength and endurance to facilitate standing for extended periods of time for 2-3 hours daily for household and community ambulation. Timeframe: 10-12 visits. (MET 8/6/24)  Long-Term Goals:  The patient will improve LE strength and endurance to facilitate walking distances of 1-2 mile(s) daily for household and community ambulation. Timeframe: 14-16 visits. (MET 8/6/24)  Patient will improve lower motor control to perform double-leg squat with symmetrical loading, without asymmetries, and with proper posterior chain loading to facilitate squatting and lifting ADL's. Timeframe: 16 visits. (MET 9/6/24)  Patient will improve LE mobility,  strength, and single-leg stabilization to meet strength requirements for reciprocal stair navigation pattern with no asymmetries for ascending and descending 5 flights of stairs daily for community integration. Timeframe: 20 visits. (MET 9/6/24)  Patient will improve dynamic LE strength and stabilization to facilitate running intermittent short distances for return to general health and wellness and for safety with crossing the street quickly, if needed. Timeframe:  24 visits. (IN PROGRESS 9/6/24)  Patient will improve lower quarter motor control to perform a single-leg squat on involved side with proper posterior chain loading and adequate trunk, hip and knee stabilization to facilitate normalized strength for safe return to sports participation. Timeframe: 28-30 visits. (IN PROGRESS 9/6/24)  Patient will improve LEFS score by at least 9 points to indicate a true change in improved function for ADL's and restoring PLF. Timeframe: 28-30 visits. (MET 8/6/24)    Plan: Continue to progress quad strengthening on involved LE, focusing on SL bias.       Charges: MT x 1, Therex x 1, Neuro x 1, Ther Act x 1  Total Timed Treatment: 39 min    Total Treatment Time: 39 min

## 2024-10-09 ENCOUNTER — TELEPHONE (OUTPATIENT)
Dept: PHYSICAL THERAPY | Age: 19
End: 2024-10-09

## 2024-10-16 ENCOUNTER — TELEPHONE (OUTPATIENT)
Dept: PHYSICAL THERAPY | Age: 19
End: 2024-10-16

## 2024-10-18 ENCOUNTER — OFFICE VISIT (OUTPATIENT)
Dept: PHYSICAL THERAPY | Age: 19
End: 2024-10-18
Attending: ORTHOPAEDIC SURGERY
Payer: COMMERCIAL

## 2024-10-18 PROCEDURE — 97110 THERAPEUTIC EXERCISES: CPT

## 2024-10-18 PROCEDURE — 97112 NEUROMUSCULAR REEDUCATION: CPT

## 2024-10-18 NOTE — PROGRESS NOTES
Discharge Summary  Pt has attended 22 visits in Physical Therapy.     Date of Service: 10/18/2024  Dx: Rupture of posterior cruciate ligament of right knee, subsequent encounter (S83.004E)   DOS: 6/25/24      Insurance: [facility]  Insurance Limits: auth required  Visit #: 22  Authorized # of Visits: 25  POC/Auth Expiration: 11/18/24  Date of Last PN: 9/6/24 (Visit #18)  Authorizing Physician/Provider: Ben Diaz MD visit: 7/1/24  Fall Risk: Standard         Precautions: None           Subjective: Patient reports remaining concerns are mostly with descending stairs quickly, fear to try running but feels that she could if she had to. Patient states she is able to do all lower activity tasks without issue, only mild struggle at times with higher level activity, feels confident with DC today.    Objective:     Post LEFS Score  Post LEFS Score: 92.5 % (10/18/2024  9:10 AM)    86.25 % improvement     Strength/MMT:             Knee Motion Strength Strength 10/18    Right Left R L   Knee Extension 4/5 5/5 5/5 tremble 5/5   Knee Flexion 4-/5 4+/5 5/5 5/5   *indicates activity was associated with pain               Hip Motion Strength Strength 10/18    Right Left R L   Hip Flexion 5/5 5/5 5/5 5/5   Hip Extension 4-/5 4+/5 4/5 4+/5   Hip ABD 4-/5 4+/5 5/5 4+/5   *indicates activity was associated with pain    Pain/Symptom Presentation:    Pain at rest: 0/10  Pain at worst: 0/10     Treatment:      Therapeutic Exercise: x 30   Seated HS Mob with Ball x 20 (R)  Standing HS Stretch 20\" ea R/L  Reassess  Elastic band base position fwd/retro walk: 2 laps  x 20 steps (B), red band around both knees and ankles   Lateral Step Down 8\" 2 x 10 (R)  Forward Step Down 6\" 2 x 10 (R)    Neuro: 8'    Cone Alternating Taps in SLS x 10 each hand   SLB with SLHR 2 x 10    Assessment: Patient has attended 22 visits for surgery following rupture of posterior cruciate ligament of right knee, and has demonstrated consistent  improvements in strength and mobility gains of hip and knee. Patient has met majority of short and long term goals at this time, still displays some mild fear/avoidence behavior but was able to talk through with patient. Patient still with some mild hip extension weakness on effected LE, and with reports of deficit with control descending stairs. Patient was provided with updated HEP this day to address eccentric quad control/strength, lower leg \"heaviness\" with bending knee, and balance challenges. Patient is appropriate for DC at this time and transition to final HEP to address remaining deficits of RLE.    Access Code: [email or web address]  URL: [email or web address]  Date: 10/18/2024  Prepared by: Anika Murphy    Exercises  - Seated Long Arc Quad with Ball for HS Mob  - 1-3 x daily - 7 x weekly - 1 sets - 20 reps  - Lateral Step Down  - 1 x daily - 7 x weekly - 2 sets - 10 reps  - Forward Step Down  - 1 x daily - 7 x weekly - 2 sets - 10 reps  - Single Leg Balance with Alternating Floor Reaches  - 1 x daily - 7 x weekly - 1 sets - 10 reps  - Standing Single Leg Heel Raise  - 1 x daily - 7 x weekly - 2 sets - 10 reps    Goals:   Short-Term Goals:  Patient will demonstrates knee flexion AROM to 90deg pain-free to facilitate sitting comfortably for 2 hours daily for sitting in a chair or at a desk. Timeframe: 4-6 visits. (MET 8/6/24)    Patient will improve knee flexion AROM to 120deg to allow for reciprocal stair navigation pattern for household ambulation. Timeframe: 6-8 visits.     Patient will improve knee strength and stability to facilitate discontinued use of AD for daily ambulation. Timeframe: 8-10 visits. (MET 8/6/24)    The patient will improve quad strength and single-leg stance stability to demonstrate non-antalgic gait pattern with walking short-length distances for household ambulation. Timeframe: 10-12 visits. (MET 8/6/24)    The patient will improve LE strength and endurance to facilitate  standing for extended periods of time for 2-3 hours daily for household and community ambulation. Timeframe: 10-12 visits. (MET 8/6/24)    Long-Term Goals:  The patient will improve LE strength and endurance to facilitate walking distances of 1-2 mile(s) daily for household and community ambulation. Timeframe: 14-16 visits. (MET 8/6/24)    Patient will improve lower motor control to perform double-leg squat with symmetrical loading, without asymmetries, and with proper posterior chain loading to facilitate squatting and lifting ADL's. Timeframe: 16 visits. (MET 9/6/24)    Patient will improve LE mobility, strength, and single-leg stabilization to meet strength requirements for reciprocal stair navigation pattern with no asymmetries for ascending and descending 5 flights of stairs daily for community integration. Timeframe: 20 visits. (MET 9/6/24)    Patient will improve dynamic LE strength and stabilization to facilitate running intermittent short distances for return to general health and wellness and for safety with crossing the street quickly, if needed. Timeframe:  24 visits. (MET, MMT reflects ability to do so though has not been attempted 10/18)    Patient will improve lower quarter motor control to perform a single-leg squat on involved side with proper posterior chain loading and adequate trunk, hip and knee stabilization to facilitate normalized strength for safe return to sports participation. Timeframe: 28-30 visits. (Not Met, still with hip IR compensations 10/18)    Patient will improve LEFS score by at least 9 points to indicate a true change in improved function for ADL's and restoring PLF. Timeframe: 28-30 visits. (MET 8/6/24) -- 92.5% Score by 10/18    Plan: Continue to progress quad strengthening on involved LE, focusing on SL bias.       Charges: Therex x 2, Neuro x 1  Total Timed Treatment: 38 min    Total Treatment Time: 38 min

## 2024-11-22 ENCOUNTER — TELEPHONE (OUTPATIENT)
Facility: CLINIC | Age: 19
End: 2024-11-22

## 2024-11-22 DIAGNOSIS — M54.6 THORACIC BACK PAIN, UNSPECIFIED BACK PAIN LATERALITY, UNSPECIFIED CHRONICITY: Primary | ICD-10-CM

## 2024-11-22 NOTE — TELEPHONE ENCOUNTER
New patient scheduled an appt on 12/13 for mid back pain. MRI results in Epic. No recent xrays. Please enter orders. Patient will reach out to [facility] records for prior imaging and bring CD with her to the appt if it's available.   Advised pt to arrive 20 min early for xrays.    Future Appointments   Date Time Provider Department Center   12/13/2024  9:00 AM Eri Rios APRN [facility] [address]

## 2024-11-22 NOTE — TELEPHONE ENCOUNTER
Mid back pain~ no Thoracic Xrays     MRI in EMR- do you want Thoracic or Lumbar- Please advise~    Thanks!

## 2024-12-06 ENCOUNTER — HOSPITAL ENCOUNTER (OUTPATIENT)
Dept: GENERAL RADIOLOGY | Age: 19
Discharge: HOME | End: 2024-12-06
Attending: NURSE PRACTITIONER

## 2024-12-06 DIAGNOSIS — M54.6 THORACIC BACK PAIN, UNSPECIFIED BACK PAIN LATERALITY, UNSPECIFIED CHRONICITY: ICD-10-CM

## 2025-01-20 ENCOUNTER — OFFICE VISIT (OUTPATIENT)
Dept: FAMILY MEDICINE CLINIC | Facility: CLINIC | Age: 20
End: 2025-01-20
Payer: COMMERCIAL

## 2025-01-20 VITALS
HEART RATE: 90 BPM | HEIGHT: 66 IN | TEMPERATURE: 97 F | WEIGHT: 134.81 LBS | DIASTOLIC BLOOD PRESSURE: 82 MMHG | BODY MASS INDEX: 21.67 KG/M2 | RESPIRATION RATE: 16 BRPM | SYSTOLIC BLOOD PRESSURE: 120 MMHG | OXYGEN SATURATION: 94 %

## 2025-01-20 DIAGNOSIS — J02.0 STREP PHARYNGITIS: Primary | ICD-10-CM

## 2025-01-20 DIAGNOSIS — J02.9 SORE THROAT: ICD-10-CM

## 2025-01-20 LAB
CONTROL LINE PRESENT WITH A CLEAR BACKGROUND (YES/NO): YES YES/NO
KIT LOT #: NORMAL NUMERIC
STREP GRP A CUL-SCR: POSITIVE

## 2025-01-20 RX ORDER — AMOXICILLIN 500 MG/1
1000 CAPSULE ORAL
Qty: 20 CAPSULE | Refills: 0 | Status: SHIPPED | OUTPATIENT
Start: 2025-01-20 | End: 2025-01-30

## 2025-01-20 NOTE — PROGRESS NOTES
Chief Complaint   Patient presents with    Lymph Node     Neck Lymph node X 3-4 months   No pain          HPI  Arianna Brown is a 19 year old F pt who presents today for mass on R side of neck    Patient presents with a swollen lymph node on the neck that started 3 to 4 months ago. The swelling is not painful. The patient had COVID one month ago and reports a sore throat persisting since then. No fever reported. The throat pain has been intermittent, improving and then recurring. The patient reports some coughing, which has improved but still persists mildly. She has been using saline spray for symptom relief.    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    ROS  As per HPI    Past Medical History:    Anxiety    Attention deficit hyperactivity disorder (ADHD)    not on any medications    Autism spectrum disorder (HCC)    pt states she was thought to be on  spectrum when she was school age- does not feel that is correct    Back problem    Depression    Hx of motion sickness    Migraines    Muscle weakness    bilateral hands    Seizure disorder (HCC)    twice in 5th grade- none since then       Past Surgical History:   Procedure Laterality Date    Endoscopy of bowel pouch      Knee surgery Right 06/25/2024    PCL repair       Social History     Socioeconomic History    Marital status: Single   Tobacco Use    Smoking status: Never     Passive exposure: Never    Smokeless tobacco: Never    Tobacco comments:     non-smoker   Vaping Use    Vaping status: Never Used   Substance and Sexual Activity    Alcohol use: Never    Drug use: Never    Sexual activity: Never     Partners: Male   Other Topics Concern    Caffeine Concern Yes     Comment: 1-2x cups daily    Exercise Yes     Comment: active    Seat Belt Yes       Family History   Problem Relation Age of Onset    Breast Cancer Maternal Grandmother     Breast Cancer Maternal Aunt     Breast Cancer Maternal Great-Grandmother         Medications Ordered  Prior to Encounter[1]      Objective  Vitals:    01/20/25 1330   BP: 120/82   Pulse: 90   Resp: 16   Temp: 97.3 °F (36.3 °C)   SpO2: 94%   Weight: 134 lb 12.8 oz (61.1 kg)   Height: 5' 6\" (1.676 m)   Body mass index is 21.76 kg/m².    Physical Exam  Constitutional:       Appearance: Normal appearance.   HEENT:      Head: Normocephalic and atraumatic.      Eyes: PERRLA no notable nystagmus     Ears: normal on observation. TM clear b/l     Nose: Nose normal.      Mouth: Mucous membranes are moist. +erythematous oropharynx. +enlarged tonsils, no exudates     Neck: no lymphadenopathy, +palpable R/L carotid arteries  Cardiovascular:      Rate and Rhythm: Normal rate and regular rhythm.   Pulmonary:      Effort: Pulmonary effort is normal.      Breath sounds: Normal breath sounds.  Musculoskeletal:         General: Normal range of motion.   Skin:     General: Skin is warm and dry.   Neurological:      General: No focal deficit present.      Mental Status: Alert and oriented to person, place, and time.   Psychiatric:         Mood and Affect: Mood normal.         Thought Content: Thought content normal      Assessment and Plan  Arianna was seen today for lymph node.    Diagnoses and all orders for this visit:    Strep pharyngitis  -     amoxicillin 500 MG Oral Cap; Take 2 capsules (1,000 mg total) by mouth once daily for 10 days.    Sore throat  -     Rapid Strep       Discussed methods to avoid transmission   Encouraged completing course of antibiotics  Pt verbalizes understanding, all questions/concerns addressed, in agreement w/plan        Follow up  Return if symptoms worsen or fail to improve.      Renetta Garcia MD        [1]   Current Outpatient Medications on File Prior to Visit   Medication Sig Dispense Refill    [facility] OR        No current facility-administered medications on file prior to visit.

## 2025-01-22 ENCOUNTER — TELEPHONE (OUTPATIENT)
Dept: FAMILY MEDICINE CLINIC | Facility: CLINIC | Age: 20
End: 2025-01-22

## 2025-01-22 NOTE — TELEPHONE ENCOUNTER
Renetta Garcia MD  You1 minute ago (1:22 PM)       Please confirm she is taking   1000mg daily. Continue the   antibiotics for 10 days as   prescribed     Patient advised. Verbalized understanding.

## 2025-01-22 NOTE — TELEPHONE ENCOUNTER
Patient is calling to ask a nurse if feeling worse is normal for strep. She has been taking the antibiotic but she says its almost two days later and she isn't getting better. What should she do?

## 2025-01-22 NOTE — TELEPHONE ENCOUNTER
Call from patient  Here on 1/20/25 for strep  Prescribed amox 500mg   States not feeling better  Her throat looks worse, has light colored spots that it didn't previously have  No difficulty breathing or swallowing  General malaise  Doesn't have a thermometer but doesn't feel like has a fever  Please advise

## 2025-01-23 ENCOUNTER — HOSPITAL ENCOUNTER (OUTPATIENT)
Age: 20
Discharge: HOME | End: 2025-01-23
Payer: COMMERCIAL

## 2025-01-23 ENCOUNTER — APPOINTMENT (OUTPATIENT)
Dept: GENERAL RADIOLOGY | Age: 20
End: 2025-01-23
Attending: NURSE PRACTITIONER
Payer: COMMERCIAL

## 2025-01-23 VITALS
SYSTOLIC BLOOD PRESSURE: 135 MMHG | RESPIRATION RATE: 26 BRPM | DIASTOLIC BLOOD PRESSURE: 80 MMHG | BODY MASS INDEX: 20.89 KG/M2 | HEART RATE: 109 BPM | TEMPERATURE: 98 F | WEIGHT: 130 LBS | OXYGEN SATURATION: 100 % | HEIGHT: 66 IN

## 2025-01-23 DIAGNOSIS — R07.89 CHEST HEAVINESS: Primary | ICD-10-CM

## 2025-01-23 DIAGNOSIS — F41.9 ANXIETY: ICD-10-CM

## 2025-01-23 PROCEDURE — 71046 X-RAY EXAM CHEST 2 VIEWS: CPT | Performed by: NURSE PRACTITIONER

## 2025-01-23 NOTE — ED INITIAL ASSESSMENT (HPI)
Chest tightness for 2 or 3 days. Tested positive for covid a month ago. Does not have any new cold symptoms. States she did test positive for strep last Monday and has been on amoxicillin since 1/20/2025.

## 2025-01-23 NOTE — DISCHARGE INSTRUCTIONS
Rest and drink plenty of fluids.   Take time for yourself.  Try meditation through an ailyn like Calm or Insight timer.  Read a book, take a bath, or take a walk.   When the anxiety hits try to get your mind off the thoughts that are causing the anxiety.  Try to name 5 things you can touch in the environment around you, then try to name 4 things you can see, then 3 things you can hear, then 2 things you can smell, and then 1 thing you can taste.  This can help to calm the mind and refocus your thoughts.   Follow up with your PCP in a few days.

## 2025-01-23 NOTE — ED PROVIDER NOTES
Patient Seen in: [facility]      History     Chief Complaint   Patient presents with    Chest Pain     Stated Complaint: chest tightness. COVID positive 1 month ago.    Subjective:   20 yo female presents to the immediate care with c/o chest pain.  Patient states over the last few days she has been having chest tightness or heaviness to the center of her chest.  She had COVID about 1 month ago, but states that all of her symptoms resolved after about a week or so.  She has a history of anxiety, and has felt anxious recently.  She denies any fever, chills, cough, runny nose, congestion, shortness of breath, nausea, or radiation of the pain.      The history is provided by the patient.         Objective:     Past Medical History:    Anxiety    Attention deficit hyperactivity disorder (ADHD)    not on any medications    Autism spectrum disorder (HCC)    pt states she was thought to be on  spectrum when she was school age- does not feel that is correct    Back problem    Depression    Hx of motion sickness    Migraines    Muscle weakness    bilateral hands    Seizure disorder (HCC)    twice in 5th grade- none since then              Past Surgical History:   Procedure Laterality Date    Endoscopy of bowel pouch      Knee surgery Right 06/25/2024    PCL repair                Social History     Socioeconomic History    Marital status: Single   Tobacco Use    Smoking status: Never     Passive exposure: Never    Smokeless tobacco: Never    Tobacco comments:     non-smoker   Vaping Use    Vaping status: Never Used   Substance and Sexual Activity    Alcohol use: Never    Drug use: Never    Sexual activity: Never     Partners: Male   Other Topics Concern    Caffeine Concern Yes     Comment: 1-2x cups daily    Exercise Yes     Comment: active    Seat Belt Yes     Social Drivers of Health     Food Insecurity: No Food Insecurity (4/25/2024)    Received [facility]    Food Insecurity      Currently or in the past 3 months, have you worried your food would run out before you had money to buy more?: No     In the past 12 months, have you run out of food or been unable to get more?: No   Transportation Needs: No Transportation Needs (4/25/2024)    Received from [facility]    Transportation Needs     Currently or in the past 3 months, has lack of transportation kept you from medical appointments, getting food or medicine, or providing care to a family member?: Unrecognized value     Medical Transportation Needs?: No    Received from [facility], [facility]    Social Connections    Received from [facility], [facility]    Housing Stability              Review of Systems   Constitutional:  Negative for chills and fever.   Respiratory:  Positive for chest tightness. Negative for cough and shortness of breath.    Cardiovascular:  Positive for chest pain. Negative for palpitations and leg swelling.   All other systems reviewed and are negative.      Positive for stated complaint: chest tightness. COVID positive 1 month ago.  Other systems are as noted in HPI.  Constitutional and vital signs reviewed.      All other systems reviewed and negative except as noted above.    Physical Exam     ED Triage Vitals   BP 01/23/25 1412 135/80   Pulse 01/23/25 1412 109   Resp 01/23/25 1412 26   Temp 01/23/25 1414 98.1 °F (36.7 °C)   Temp src 01/23/25 1414 Oral   SpO2 01/23/25 1412 100 %   O2 Device 01/23/25 1412 None (Room air)       Current Vitals:   Vital Signs  BP: 135/80  Pulse: 109  Resp: 26  Temp: 98.1 °F (36.7 °C)  Temp src: Oral    Oxygen Therapy  SpO2: 100 %  O2 Device: None (Room air)        Physical Exam  Vitals and nursing note reviewed.   Constitutional:       General: She is not in acute distress.     Appearance: She is well-developed and normal weight. She is not ill-appearing.   HENT:      Head: Normocephalic  and atraumatic.   Eyes:      Pupils: Pupils are equal, round, and reactive to light.   Cardiovascular:      Rate and Rhythm: Normal rate and regular rhythm.      Pulses:           Radial pulses are 2+ on the right side and 2+ on the left side.      Heart sounds: Normal heart sounds. No murmur heard.  Pulmonary:      Effort: Pulmonary effort is normal.      Breath sounds: Normal breath sounds.   Chest:      Chest wall: No tenderness.   Musculoskeletal:         General: Normal range of motion.   Skin:     General: Skin is warm and dry.   Neurological:      General: No focal deficit present.      Mental Status: She is alert and oriented to person, place, and time.   Psychiatric:         Mood and Affect: Mood normal.         Behavior: Behavior normal.           ED Course   Labs Reviewed - No data to display  EKG    Rate, intervals and axes as noted on EKG Report.  Rate: 102 bpm  Rhythm: Sinus Rhythm  Reading: Sinus tachycardia with no ectopy or STEMI.             ED Course as of 01/23/25 1601  ------------------------------------------------------------  Time: 01/23 1514  Value: EKG 12 Lead  Comment: EKG shows sinus tachycardia at a rate of 102 bpm.  Period of 1 and 46 ms, QRS duration 84 ms, QT/QTc 330/430 ms.  No STEMI.  ------------------------------------------------------------  Time: 01/23 1527  Value: XR CHEST PA + LAT CHEST ([id number])  Comment: Impression  CONCLUSION:  No acute radiographic findings.            MDM        Medical Decision Making  19-year-old female with complaint of chest pain/chest heaviness over the last few days.  EKG and chest x-ray ordered.    EKG shows sinus tachycardia with no ectopy or STEMI.  Chest x-ray as read by radiology shows no acute cardiopulmonary pathology.  Discussed with patient that I feel this is likely due to anxiety.  Recommend follow-up with her PCP for referral for behavioral health as needed.  Patient does have a prior history of anxiety and depression.  Discussed  with patient supportive management and techniques to help with this at home.  No evidence of MI, ACS, pleurisy, costochondritis, or pneumonia.    Amount and/or Complexity of Data Reviewed  Radiology: ordered. Decision-making details documented in ED Course.  ECG/medicine tests: ordered and independent interpretation performed. Decision-making details documented in ED Course.    Risk  OTC drugs.        Disposition and Plan     Clinical Impression:  1. Chest heaviness    2. Anxiety         Disposition:  Discharge  1/23/2025  4:01 pm    Follow-up:  Kelley Dela Cruz MD  [address] [id number] [phone]    In 1 week  As needed          Medications Prescribed:  Current Discharge Medication List              Supplementary Documentation:

## 2025-01-24 LAB
ATRIAL RATE: 102 BPM
P AXIS: 79 DEGREES
P-R INTERVAL: 146 MS
Q-T INTERVAL: 330 MS
QRS DURATION: 84 MS
QTC CALCULATION (BEZET): 430 MS
R AXIS: 86 DEGREES
T AXIS: 26 DEGREES
VENTRICULAR RATE: 102 BPM

## 2025-01-28 ENCOUNTER — TELEPHONE (OUTPATIENT)
Dept: FAMILY MEDICINE CLINIC | Facility: CLINIC | Age: 20
End: 2025-01-28

## 2025-01-28 NOTE — TELEPHONE ENCOUNTER
Patient was dx with Strep and was put on antibiotics on 1/20/25, states her throat still hurts and is still  swollen, she doesn't feel the antibiotics are helping.  Patient would like to talk to the nurse, states she is not sure this is normal, she expected to feel better by now.

## 2025-01-28 NOTE — TELEPHONE ENCOUNTER
Future Appointments   Date Time Provider Department Center   1/29/2025  7:20 AM Renetta Garcia MD [facility] [place]

## 2025-01-28 NOTE — TELEPHONE ENCOUNTER
Seen by Dr. Garcia on 1/20/2025 - dx strep    Was seen in [facility] on 1/23/25 for chest pain - chest xray and EKG done.    Attempted to call patient on cell phone - message left to call back and reschedule a follow-up.    [employer] message also sent to patient.

## 2025-01-29 ENCOUNTER — OFFICE VISIT (OUTPATIENT)
Dept: FAMILY MEDICINE CLINIC | Facility: CLINIC | Age: 20
End: 2025-01-29
Payer: COMMERCIAL

## 2025-01-29 VITALS
DIASTOLIC BLOOD PRESSURE: 76 MMHG | TEMPERATURE: 97 F | SYSTOLIC BLOOD PRESSURE: 106 MMHG | WEIGHT: 132.38 LBS | BODY MASS INDEX: 21.28 KG/M2 | RESPIRATION RATE: 16 BRPM | HEIGHT: 66 IN | OXYGEN SATURATION: 99 % | HEART RATE: 110 BPM

## 2025-01-29 DIAGNOSIS — J02.0 STREP PHARYNGITIS: Primary | ICD-10-CM

## 2025-01-29 DIAGNOSIS — J02.9 SORE THROAT: ICD-10-CM

## 2025-01-29 PROCEDURE — 3008F BODY MASS INDEX DOCD: CPT

## 2025-01-29 PROCEDURE — 99213 OFFICE O/P EST LOW 20 MIN: CPT

## 2025-01-29 PROCEDURE — 3074F SYST BP LT 130 MM HG: CPT

## 2025-01-29 PROCEDURE — 3078F DIAST BP <80 MM HG: CPT

## 2025-01-29 NOTE — PATIENT INSTRUCTIONS
Complete antitiobics as prescribed   Discussed ibuprofen for pain, salt water gargle and lozenges as needed

## 2025-01-29 NOTE — PROGRESS NOTES
Chief Complaint   Patient presents with    Sore Throat     Still has one day of antibiotics          HPI  Arianna Brown is a 19 year old F pt who presents today for follow up on strep pharyngitis    Patient reports persistent sore throat following recent strep diagnosis. She has completed 9 days of the 10-day course of amoxicillin, with today being the last day. The sore throat has been intermittent, improving for a day or a few hours before recurring. Associated symptoms include ear pain. Patient reports taking Tylenol for headaches. No fever or cough reported.    Patient denies history of smoking cigarettes or drinking alcohol.       ROS  As per HPI    Past Medical History:    Anxiety    Attention deficit hyperactivity disorder (ADHD)    not on any medications    Autism spectrum disorder (HCC)    pt states she was thought to be on  spectrum when she was school age- does not feel that is correct    Back problem    Depression    Hx of motion sickness    Migraines    Muscle weakness    bilateral hands    Seizure disorder (HCC)    twice in 5th grade- none since then       Past Surgical History:   Procedure Laterality Date    Endoscopy of bowel pouch      Knee surgery Right 06/25/2024    PCL repair       Social History     Socioeconomic History    Marital status: Single   Tobacco Use    Smoking status: Never     Passive exposure: Never    Smokeless tobacco: Never    Tobacco comments:     non-smoker   Vaping Use    Vaping status: Never Used   Substance and Sexual Activity    Alcohol use: Never    Drug use: Never    Sexual activity: Never     Partners: Male   Other Topics Concern    Caffeine Concern Yes     Comment: 1-2x cups daily    Exercise Yes     Comment: active    Seat Belt Yes       Family History   Problem Relation Age of Onset    Breast Cancer Maternal Grandmother     Breast Cancer Maternal Aunt     Breast Cancer Maternal Great-Grandmother         Medications Ordered Prior to  Encounter[1]      Objective  Vitals:    01/29/25 0725   BP: 106/76   Pulse: 110   Resp: 16   Temp: 97.2 °F (36.2 °C)   SpO2: 99%   Weight: 132 lb 6.4 oz (60.1 kg)   Height: 5' 6\" (1.676 m)   Body mass index is 21.37 kg/m².    Physical Exam  Constitutional:       Appearance: Normal appearance.   HEENT:      Head: Normocephalic and atraumatic.      Ears: normal on observation. TM clear b/l     Nose: Nose normal.     Mouth: Mucous membranes are moist. +erythematous oropharynx. Tonsils not enlarged     Neck: no lymphadenopathy  Cardiovascular:      Rate and Rhythm: Normal rate and regular rhythm.   Pulmonary:      Effort: Pulmonary effort is normal.      Breath sounds: Normal breath sounds.  Musculoskeletal:         General: Normal range of motion.   Skin:     General: Skin is warm and dry.   Neurological:      General: No focal deficit present.      Mental Status: Alert and oriented to person, place, and time.   Psychiatric:         Mood and Affect: Mood normal.         Thought Content: Thought content normal        Assessment and Plan  Arianna was seen today for sore throat.    Diagnoses and all orders for this visit:    Strep pharyngitis  Comments:  Complete antitiobics as prescribed   Discussed ibuprofen for pain, salt water gargle and lozenges as needed    Sore throat  Comments:  Discussed ibuprofen for pain, salt water gargle and lozenges as needed       Pt verbalizes understanding, all questions/concerns addressed, in agreement w/plan        Follow up  Return if symptoms worsen or fail to improve.      Patient Instructions  Patient Instructions   Complete antitiobics as prescribed   Discussed ibuprofen for pain, salt water gargle and lozenges as needed        Renetta Garcia MD          [1]   Current Outpatient Medications on File Prior to Visit   Medication Sig Dispense Refill    [facility] OR       amoxicillin 500 MG Oral Cap Take 2 capsules (1,000 mg total) by mouth once daily for 10 days. 20 capsule 0     No current  facility-administered medications on file prior to visit.

## 2025-01-31 ENCOUNTER — OFFICE VISIT (OUTPATIENT)
Dept: FAMILY MEDICINE CLINIC | Facility: CLINIC | Age: 20
End: 2025-01-31
Payer: COMMERCIAL

## 2025-01-31 VITALS
DIASTOLIC BLOOD PRESSURE: 88 MMHG | RESPIRATION RATE: 18 BRPM | SYSTOLIC BLOOD PRESSURE: 122 MMHG | OXYGEN SATURATION: 100 % | WEIGHT: 130 LBS | TEMPERATURE: 99 F | HEIGHT: 66 IN | HEART RATE: 103 BPM | BODY MASS INDEX: 20.89 KG/M2

## 2025-01-31 DIAGNOSIS — Z87.09 HISTORY OF STREP PHARYNGITIS: ICD-10-CM

## 2025-01-31 DIAGNOSIS — J02.9 SORE THROAT: Primary | ICD-10-CM

## 2025-01-31 PROCEDURE — 3008F BODY MASS INDEX DOCD: CPT | Performed by: PHYSICIAN ASSISTANT

## 2025-01-31 PROCEDURE — 3074F SYST BP LT 130 MM HG: CPT | Performed by: PHYSICIAN ASSISTANT

## 2025-01-31 PROCEDURE — 3079F DIAST BP 80-89 MM HG: CPT | Performed by: PHYSICIAN ASSISTANT

## 2025-01-31 PROCEDURE — 99213 OFFICE O/P EST LOW 20 MIN: CPT | Performed by: PHYSICIAN ASSISTANT

## 2025-01-31 RX ORDER — AZITHROMYCIN 250 MG/1
TABLET, FILM COATED ORAL
Qty: 6 TABLET | Refills: 0 | Status: SHIPPED | OUTPATIENT
Start: 2025-01-31 | End: 2025-02-01

## 2025-02-01 ENCOUNTER — TELEPHONE (OUTPATIENT)
Dept: FAMILY MEDICINE CLINIC | Facility: CLINIC | Age: 20
End: 2025-02-01

## 2025-02-01 RX ORDER — CEPHALEXIN 500 MG/1
500 CAPSULE ORAL 2 TIMES DAILY
Qty: 20 CAPSULE | Refills: 0 | Status: SHIPPED | OUTPATIENT
Start: 2025-02-01 | End: 2025-02-11

## 2025-02-01 NOTE — TELEPHONE ENCOUNTER
Spoke with patient.  She was prescribed zithromax yesterday for strep throat that seemed to recur after amoxicillin treatment and is worried about the warnings on the package insert regarding heart arrhythmia.  Patient states she has a lot of health anxiety.    Discussed with her after reviewing her recent EKG that there is no QT abnormality.      Patient is still interested in switching antibiotics.    Cephalexin sent to [facility] pharmacy in [place].

## 2025-02-01 NOTE — PATIENT INSTRUCTIONS
Zpack as prescribed.        You are considered to be contagious until you have been on antibiotics for 24 hours.   You can return to school and/or work once you have been on antibiotics and fever free for 24 hours.   Over-the-counter (OTC) pain medications such as acetaminophen (Tylenol) and ibuprofen (Motrin or Advil) can be effective for reducing fever and providing pain control. Adequate pain control can also help with increasing fluid intake.   Get extra sleep. Adequate rest and sleep can promote a more rapid recovery.   Drink plenty of fluids to avoid dehydration. Because fever can increase fluid loss and painful swallowing can decrease fluid intake, measures must be taken to avoid dehydration. Choose high-quality fluids such as warm soup broth (which replaces both salt and water loss) and sugar-containing solutions (they help the body absorb the fluids more rapidly). Avoid caffeine because it can cause water loss. Sometimes cold beverages, Popsicles, and ice cream can be soothing and beneficial   Obtain a new toothbrush after you have been on antibiotics for 2 days to prevent re-exposure to germs causing illness.   Throat lozenges can sometimes provide temporary relief for a minor sore throat. Various formulations exist, though they are not recommended for young children, due to the possibility of the child aspirating the small lozenge. Gargling with salt water is also sometimes helpful; people may try mixing table salt (about 1 to 2 teaspoons) with warm water (about 8 oz) and gargling.   Avoid tobacco products and alcohol.    Do not share utensils or drinks with anyone to avoid spread of illness  Follow up in 3-5 days if not improving, condition worsens, or fever greater than or equal to 100.4 persists for 72 hours.    Be sure to finish entire course of antibiotics to reduce risk of reinfection or antibiotic resistance

## 2025-02-01 NOTE — PROGRESS NOTES
CHIEF COMPLAINT:     Chief Complaint   Patient presents with    Sore Throat     Not better since last visit          HPI:   Arianna Brown is a 19 year old female presents to clinic with symptoms of sore throat.  Dx/tx strep by PCP on 1/20/25, strep confirmed with (+) rapid strep, completed 10 day course of amoxicillin and replaced toothbrush.  Sx never changed with abx tx.   Denies fever, no chills/sweats, no n/v/d, no rash, no myalgias/arthralgias.   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   No other complaints.         Current Outpatient Medications   Medication Sig Dispense Refill    azithromycin (ZITHROMAX Z-PARKER) 250 MG Oral Tab Take 2 tablets (500 mg total) by mouth daily for 1 day, THEN 1 tablet (250 mg total) daily for 4 days. 6 tablet 0    [facility] OR         Past Medical History:    Anxiety    Attention deficit hyperactivity disorder (ADHD)    not on any medications    Autism spectrum disorder (HCC)    pt states she was thought to be on  spectrum when she was school age- does not feel that is correct    Back problem    Depression    Hx of motion sickness    Migraines    Muscle weakness    bilateral hands    Seizure disorder (HCC)    twice in 5th grade- none since then      Social History:  Social History     Socioeconomic History    Marital status: Single   Tobacco Use    Smoking status: Never     Passive exposure: Never    Smokeless tobacco: Never    Tobacco comments:     non-smoker   Vaping Use    Vaping status: Never Used   Substance and Sexual Activity    Alcohol use: Never    Drug use: Never    Sexual activity: Never     Partners: Male   Other Topics Concern    Caffeine Concern Yes     Comment: 1-2x cups daily    Exercise Yes     Comment: active    Seat Belt Yes     Social Drivers of Health     Food Insecurity: No Food Insecurity (4/25/2024)    Received from [facility]    Food Insecurity     Currently or in the past 3 months, have you worried your food would run out before you had money to buy  more?: No     In the past 12 months, have you run out of food or been unable to get more?: No   Transportation Needs: No Transportation Needs (4/25/2024)    Received from [facility]    Transportation Needs     Currently or in the past 3 months, has lack of transportation kept you from medical appointments, getting food or medicine, or providing care to a family member?: Unrecognized value     Medical Transportation Needs?: No    Received from [facility], [facility]    Social Connections    Received from [facility], [facility]    Housing Stability        REVIEW OF SYSTEMS:   GENERAL HEALTH:  See HPI  SKIN: see HPI  HEENT: denies ear pain, See HPI  RESPIRATORY: denies shortness of breath, or wheezing  CARDIOVASCULAR: denies chest pain, palpitations   GI: denies abdominal pain, constipation and diarrhea  NEURO: denies dizziness or lightheadedness    EXAM:   /88   Pulse 103   Temp 98.6 °F (37 °C)   Resp 18   Ht 5' 6\" (1.676 m)   Wt 130 lb (59 kg)   LMP 01/06/2025 (Approximate)   SpO2 100%   BMI 20.98 kg/m²   GENERAL: well developed, well nourished,in no apparent distress  SKIN: no rashes,no suspicious lesions  HEAD: atraumatic, normocephalic  EYES: conjunctiva clear, EOM intact  EARS: TM's clear, non-injected, no bulging, retraction, or fluid  NOSE: nostrils patent, no exudates, nasal mucosa pink and noninflamed  THROAT: oral mucosa pink, moist. Posterior pharynx erythematous and injected. without exudates. Tonsils 2+/4.  Breath non malodorous. No uvular deviation. No drooling.  NECK: supple, trachea midline, no stridor.    LUNGS: clear to auscultation bilaterally, no wheezes or rhonchi. Breathing is non labored.  CARDIO: RRR without murmur  EXTREMITIES: no cyanosis or edema  LYMPH: (+) anterior cervical. shotty submandibular lymphadenopathy.  No posterior cervical or occipital  lymphadenopathy.        ASSESSMENT AND PLAN:   Assessment:   Encounter Diagnoses   Name Primary?    History of strep pharyngitis     Sore throat Yes         Plan:      Zpack per epic.  Pt dx/tx strep on 1/20/25, completed amoxicillin without any notable improvement in symptoms throughout tx.  Advised replace toothbrush and toothpaste 24-48 hours after abx initiation.  Discussed close f/u with PCP and consideration of throat cx for recheck given recurrent sx.  Pt v/u       Requested Prescriptions     Signed Prescriptions Disp Refills    azithromycin (ZITHROMAX Z-PARKER) 250 MG Oral Tab 6 tablet 0     Sig: Take 2 tablets (500 mg total) by mouth daily for 1 day, THEN 1 tablet (250 mg total) daily for 4 days.       Risks, benefits, complications and side effects of meds discussed with patient.     OTC Tylenol/Motrin prn.   Push fluids- warm or cool liquids, whichever is soothing for patient  If treated with antibiotics, change tooth brush after on medication for 48 hours.   Warm salt water gargles 2 times per day for at least 3 days.    Do not share utensils or drinks with anyone.      Follow up with PCP if not improving, condition worsens, or fever greater than or equal to 100.4 persists for 72 hours.      The patient/parent indicates understanding of these issues and agrees to the plan.  The patient is asked to follow up with their PCP prn.      Joseline Camacho PA-C

## 2025-02-06 ENCOUNTER — HOSPITAL ENCOUNTER (OUTPATIENT)
Age: 20
Discharge: HOME | End: 2025-02-06
Payer: COMMERCIAL

## 2025-02-06 VITALS
TEMPERATURE: 99 F | SYSTOLIC BLOOD PRESSURE: 121 MMHG | BODY MASS INDEX: 21 KG/M2 | HEART RATE: 112 BPM | OXYGEN SATURATION: 100 % | WEIGHT: 130 LBS | RESPIRATION RATE: 16 BRPM | DIASTOLIC BLOOD PRESSURE: 76 MMHG

## 2025-02-06 DIAGNOSIS — J02.9 SORE THROAT: Primary | ICD-10-CM

## 2025-02-06 LAB
POCT MONO: NEGATIVE
S PYO AG THROAT QL: NEGATIVE

## 2025-02-06 NOTE — ED PROVIDER NOTES
Patient Seen in: [facility]      History     Chief Complaint   Patient presents with    Sore Throat     Stated Complaint: sore throat    Subjective:   HPI    18 YO female with below stated past medical history presents for evaluation of sore throat for approximately 3 to 4 weeks. Had COVID end of December 2024. Initially thought sore throat was residual COVID symptom. Saw PCP 1/29/25, diagnosed with strep. Did not feel better after Amoxicillin. Went to [facility]. Started on Keflex. On day 6 of 10 of Keflex. Sore throat persistent. No fevers/chills, body aches, cough.        Objective:     Past Medical History:    Anxiety    Attention deficit hyperactivity disorder (ADHD)    not on any medications    Autism spectrum disorder (HCC)    pt states she was thought to be on  spectrum when she was school age- does not feel that is correct    Back problem    Depression    Hx of motion sickness    Migraines    Muscle weakness    bilateral hands    Seizure disorder (HCC)    twice in 5th grade- none since then              Past Surgical History:   Procedure Laterality Date    Endoscopy of bowel pouch      Knee surgery Right 06/25/2024    PCL repair                Social History     Socioeconomic History    Marital status: Single   Tobacco Use    Smoking status: Never     Passive exposure: Never    Smokeless tobacco: Never    Tobacco comments:     non-smoker   Vaping Use    Vaping status: Never Used   Substance and Sexual Activity    Alcohol use: Never    Drug use: Never    Sexual activity: Never     Partners: Male   Other Topics Concern    Caffeine Concern Yes     Comment: 1-2x cups daily    Exercise Yes     Comment: active    Seat Belt Yes     Social Drivers of Health     Food Insecurity: No Food Insecurity (4/25/2024)    Received from [facility]    Food Insecurity     Currently or in the past 3 months, have you worried your food would run out before you had money to buy more?: No     In the past 12  months, have you run out of food or been unable to get more?: No   Transportation Needs: No Transportation Needs (4/25/2024)    Received from [facility]    Transportation Needs     Currently or in the past 3 months, has lack of transportation kept you from medical appointments, getting food or medicine, or providing care to a family member?: Unrecognized value     Medical Transportation Needs?: No    Received from [facility], [facility]    Housing Stability              Review of Systems    Positive for stated complaint: sore throat  Other systems are as noted in HPI.  Constitutional and vital signs reviewed.      All other systems reviewed and negative except as noted above.    Physical Exam     ED Triage Vitals [02/06/25 0823]   /76   Pulse 112   Resp 16   Temp 98.7 °F (37.1 °C)   Temp src Oral   SpO2 100 %   O2 Device None (Room air)       Current Vitals:   Vital Signs  BP: 121/76  Pulse: 112  Resp: 16  Temp: 98.7 °F (37.1 °C)  Temp src: Oral    Oxygen Therapy  SpO2: 100 %  O2 Device: None (Room air)        Physical Exam  Vitals and nursing note reviewed.   Constitutional:       General: She is not in acute distress.     Appearance: Normal appearance. She is not ill-appearing, toxic-appearing or diaphoretic.   HENT:      Mouth/Throat:      Mouth: Mucous membranes are moist.      Pharynx: Posterior oropharyngeal erythema present. No pharyngeal swelling or oropharyngeal exudate.   Cardiovascular:      Rate and Rhythm: Regular rhythm.      Heart sounds: Normal heart sounds.   Pulmonary:      Effort: Pulmonary effort is normal. No respiratory distress.      Breath sounds: Normal breath sounds. No wheezing.   Neurological:      Mental Status: She is alert and oriented to person, place, and time.   Psychiatric:         Mood and Affect: Mood is anxious.           ED Course     Labs Reviewed   POCT MONO TEST - Normal   POCT RAPID STREP - Normal           MDM      Differential diagnosis considered but not limited to viral pharyngitis, mono, unresolved strep pharyngitis    Afebrile and well-appearing.  Erythema to the posterior pharynx.  No swelling or exudate.  Rapid strep negative.  Mono negative.  Patient advised to complete 4 days of Keflex as prescribed from outside facility.  Supportive care discussed.  She is in agreement to continue follow-up with PCP and ENT if throat pain persist.        Medical Decision Making  Amount and/or Complexity of Data Reviewed  Labs: ordered. Decision-making details documented in ED Course.    Risk  OTC drugs.        Disposition and Plan     Clinical Impression:  1. Sore throat         Disposition:  Discharge  2/6/2025  9:35 am    Follow-up:  Kelley Dela Cruz MD  [address] [id number] [phone]          Abiodun Allen MD  [address] [facility] [id number] [phone]      If symptoms persist          Medications Prescribed:  Discharge Medication List as of 2/6/2025  9:39 AM              Supplementary Documentation:

## 2025-02-06 NOTE — ED INITIAL ASSESSMENT (HPI)
Patient reports sore throat x 1 month, patient was diagnosed with strep about 3 weeks ago and took a course of amoxicillin without improvement, she then was seen at the [facility] on 2/1 and prescribed cephalexin, but states symptoms are worsening and she has now noticed white spots in her throat, unsure of recent fever, denies other associated symptoms, she states that she does experience intermittent heart palpitations but states that she had that checked a few weeks ago and was told it was likely related to anxiety

## 2025-02-24 ENCOUNTER — OFFICE VISIT (OUTPATIENT)
Facility: LOCATION | Age: 20
End: 2025-02-24
Payer: COMMERCIAL

## 2025-02-24 DIAGNOSIS — J02.9 SORE THROAT: Primary | ICD-10-CM

## 2025-02-24 PROCEDURE — 99202 OFFICE O/P NEW SF 15 MIN: CPT | Performed by: STUDENT IN AN ORGANIZED HEALTH CARE EDUCATION/TRAINING PROGRAM

## 2025-02-24 PROCEDURE — 31575 DIAGNOSTIC LARYNGOSCOPY: CPT | Performed by: STUDENT IN AN ORGANIZED HEALTH CARE EDUCATION/TRAINING PROGRAM

## 2025-02-24 NOTE — PROGRESS NOTES
Arianna Brown is a 19 year old female.   Chief Complaint   Patient presents with    Throat Problem     Sore throat for a few months      HPI:   19 year old female presenting for evaluation of sore throat. She reports having a sore throat since having COVID at the end of December. Pt then tested positive for strep on 2/6 after which she took two courses of antibiotics. She has had a persistently sore throat since then. Pt reports her symptoms fluctuate in intensity (some days 2/10, others 4/10).  She reports some odynophagia but denies night sweats, loss of appetite, fever, chills, weight loss, cough, heartburn, hemoptysis, hoarseness, dyspnea, dysphagia and globus.       Current Outpatient Medications   Medication Sig Dispense Refill    [facility] OR         Past Medical History:    Anxiety    Attention deficit hyperactivity disorder (ADHD)    not on any medications    Autism spectrum disorder (HCC)    pt states she was thought to be on  spectrum when she was school age- does not feel that is correct    Back problem    Depression    Hx of motion sickness    Migraines    Muscle weakness    bilateral hands    Seizure disorder (HCC)    twice in 5th grade- none since then      Social History:  Social History     Socioeconomic History    Marital status: Single   Tobacco Use    Smoking status: Never     Passive exposure: Never    Smokeless tobacco: Never    Tobacco comments:     non-smoker   Vaping Use    Vaping status: Never Used   Substance and Sexual Activity    Alcohol use: Never    Drug use: Never    Sexual activity: Never     Partners: Male   Other Topics Concern    Caffeine Concern Yes     Comment: 1-2x cups daily    Exercise Yes     Comment: active    Seat Belt Yes     Social Drivers of Health     Food Insecurity: No Food Insecurity (4/25/2024)    Received from [facility]    Food Insecurity     Currently or in the past 3 months, have you worried your food would run out before you had money to buy  more?: No     In the past 12 months, have you run out of food or been unable to get more?: No   Transportation Needs: No Transportation Needs (4/25/2024)    Received from [facility]    Transportation Needs     Currently or in the past 3 months, has lack of transportation kept you from medical appointments, getting food or medicine, or providing care to a family member?: Unrecognized value     Medical Transportation Needs?: No    Received from [facility], [facility]    Housing Stability      Past Surgical History:   Procedure Laterality Date    Endoscopy of bowel pouch      Knee surgery Right 06/25/2024    PCL repair         REVIEW OF SYSTEMS:   GENERAL HEALTH: feels well otherwise  GENERAL : denies fever, chills, sweats, weight loss, weight gain  SKIN: denies any unusual skin lesions or rashes  RESPIRATORY: denies shortness of breath with exertion  NEURO: denies headaches    EXAM:   LMP 02/02/2025 (Approximate)     System Findings Details   Constitutional  Overall appearance - Normal.   Head/Face  Facial features -- Normal. Skull - Normal.   Eyes  Sclera white, pupils equal and round, EOMI   Ears  External ears normal in appearance, EACs patent, TMs intact bilaterally, no evidence of middle ear effusion   Nose  External nose normal in appearance, nares patent, septum deviated to left, no epistaxis   Throat  Posterior pharynx clear, uvula midline, tonsils 1+   Oral cavity  Lips normal in appearance, mucous membranes clear, no masses, FOM soft, tongue without lesions   Neck  Trachea midline, no lymphadenopathy, no masses   Neurological  Memory - Normal. Cranial nerves - Cranial nerves II through XII grossly intact.     Indication: odynophagia    Surgeon: Holli Kebede MD, JUNAID    Flexible fiberoptic exam  Verbal consent was obtained. Lidocaine and afrin nasal spray was administered. The flexible fiberoptic laryngoscope was lubricated and inserted into the  right nare. The nasal mucosa was pink and healthy appearing, Nasopharynx clear, no masses noted in fossa of Rosenmuller. Eustachian tubes normal in appearance. Posterior aspect of uvula with healthy appearing mucosa. Posterior oropharyngeal wall clear with healthy mucosa. Base of tongue normal without masses. Epiglottis normal in appearance. Hypopharynx clear. Bilateral vocal cords mobile, mucosa healthy in appearance. Subglottis clear. Patient tolerated the procedure well.     Impression: Normal laryngoscopy.      ASSESSMENT AND PLAN:   19 year old female presenting for evaluation of sore throat.     -Patient with normal examination including laryngoscopy.  Symptoms are most likely postinfectious in etiology  -Continue supportive care.  I offered patient a course of steroids which she declined.  -Follow-up in 1 month    The patient indicates understanding of these issues and agrees to the plan.    Holli Kebede MD, JUNAID  Facial Plastic & Reconstructive Surgery, Otolaryngology-Head & Neck Surgery  [facility]    This note was prepared using Dragon [employer] voice recognition dictation software. As a result, errors may occur. When identified, these errors have been corrected. While every attempt is made to correct errors during dictation, discrepancies may still exist.

## 2025-03-18 ENCOUNTER — OFFICE VISIT (OUTPATIENT)
Dept: FAMILY MEDICINE CLINIC | Facility: CLINIC | Age: 20
End: 2025-03-18
Payer: COMMERCIAL

## 2025-03-18 ENCOUNTER — TELEPHONE (OUTPATIENT)
Dept: FAMILY MEDICINE CLINIC | Facility: CLINIC | Age: 20
End: 2025-03-18

## 2025-03-18 VITALS
TEMPERATURE: 98 F | BODY MASS INDEX: 20.57 KG/M2 | OXYGEN SATURATION: 99 % | RESPIRATION RATE: 16 BRPM | SYSTOLIC BLOOD PRESSURE: 122 MMHG | WEIGHT: 128 LBS | DIASTOLIC BLOOD PRESSURE: 76 MMHG | HEIGHT: 66 IN | HEART RATE: 113 BPM

## 2025-03-18 DIAGNOSIS — J02.0 STREP PHARYNGITIS: Primary | ICD-10-CM

## 2025-03-18 DIAGNOSIS — J02.9 SORE THROAT: Primary | ICD-10-CM

## 2025-03-18 DIAGNOSIS — Z87.09 HISTORY OF STREP PHARYNGITIS: Primary | ICD-10-CM

## 2025-03-18 LAB
CONTROL LINE PRESENT WITH A CLEAR BACKGROUND (YES/NO): YES YES/NO
KIT LOT #: NORMAL NUMERIC
STREP GRP A CUL-SCR: NEGATIVE

## 2025-03-18 PROCEDURE — 87081 CULTURE SCREEN ONLY: CPT | Performed by: FAMILY MEDICINE

## 2025-03-18 PROCEDURE — 3074F SYST BP LT 130 MM HG: CPT | Performed by: FAMILY MEDICINE

## 2025-03-18 PROCEDURE — 87880 STREP A ASSAY W/OPTIC: CPT | Performed by: FAMILY MEDICINE

## 2025-03-18 PROCEDURE — 3008F BODY MASS INDEX DOCD: CPT | Performed by: FAMILY MEDICINE

## 2025-03-18 PROCEDURE — 99213 OFFICE O/P EST LOW 20 MIN: CPT | Performed by: FAMILY MEDICINE

## 2025-03-18 PROCEDURE — 3078F DIAST BP <80 MM HG: CPT | Performed by: FAMILY MEDICINE

## 2025-03-18 NOTE — TELEPHONE ENCOUNTER
Last office visit in this office with Dr. Garcia on 1/29/2025 for strep.    Patient has also been seen in the [facility] and [facility] for sore throat.    Patient saw ENT on 2/24/25  Dr. Kebede

## 2025-03-18 NOTE — TELEPHONE ENCOUNTER
Patient is calling to see if we can send an updated referral to the ENT in [facility], she saw her a month ago and they say they don't have a referral. Please advise.     Please update patient via mychart.

## 2025-03-18 NOTE — PROGRESS NOTES
Arianna Brown is a 19 year old female.    S:  Patient presents today with the following concerns:  Chief Complaint   Patient presents with    Sore Throat     3 days, sore throat, denies fever, denies cough  OTC none   Notes has had sore throat since she had Covid last year.  Also had strep throat.    Is seeing ENT.     Current Outpatient Medications   Medication Sig Dispense Refill    [facility]ON OR        Patient Active Problem List   Diagnosis    Attention deficit hyperactivity disorder (ADHD)    Autism (HCC)    Generalized anxiety disorder    Left ovarian cyst    Chronic migraine without aura without status migrainosus, not intractable    Nausea    Abdominal bloating    Early satiety     Family History   Problem Relation Age of Onset    Breast Cancer Maternal Grandmother     Breast Cancer Maternal Aunt     Breast Cancer Maternal Great-Grandmother        REVIEW OF SYSTEMS:  GENERAL: feels well otherwise  SKIN: denies any unusual skin lesions  EYES:denies vision change  LUNGS: denies shortness of breath with exertion  CARDIOVASCULAR: denies chest pain on exertion  GI: denies abdominal pain.  No N/V/D/C  : denies dysuria  MUSCULOSKELETAL: denies back pain  NEURO: denies headaches    EXAM:  /76   Pulse 113   Temp 98 °F (36.7 °C)   Resp 16   Ht 5' 6\" (1.676 m)   Wt 128 lb (58.1 kg)   LMP 02/25/2025 (Approximate)   SpO2 99%   Breastfeeding No   BMI 20.66 kg/m²   Physical Exam  Constitutional:       General: She is not in acute distress.     Appearance: Normal appearance. She is not ill-appearing, toxic-appearing or diaphoretic.   HENT:      Head: Normocephalic and atraumatic.      Right Ear: Tympanic membrane, ear canal and external ear normal.      Left Ear: Tympanic membrane, ear canal and external ear normal.      Mouth/Throat:      Mouth: Mucous membranes are moist.      Pharynx: Oropharynx is clear. No oropharyngeal exudate or posterior oropharyngeal erythema.      Comments: cobblestoning  Eyes:       Extraocular Movements: Extraocular movements intact.      Conjunctiva/sclera: Conjunctivae normal.      Pupils: Pupils are equal, round, and reactive to light.   Cardiovascular:      Rate and Rhythm: Normal rate and regular rhythm.      Heart sounds: Normal heart sounds.   Pulmonary:      Effort: Pulmonary effort is normal.      Breath sounds: Normal breath sounds.   Musculoskeletal:      Cervical back: Neck supple. No rigidity or tenderness.   Lymphadenopathy:      Cervical: No cervical adenopathy.   Skin:     General: Skin is warm and dry.   Neurological:      General: No focal deficit present.      Mental Status: She is alert and oriented to person, place, and time.   Psychiatric:         Mood and Affect: Mood normal.         Behavior: Behavior normal.     Rapid Strep test is Negative.     ASSESSMENT AND PLAN:  Arianna Brown is a 19 year old female.  Encounter Diagnosis   Name Primary?    Sore throat Yes       No results found.     Orders Placed This Encounter   Procedures    Strep A Assay W/Optic    Grp A Strep Cult, Throat     Meds & Refills for this Visit:  Requested Prescriptions      No prescriptions requested or ordered in this encounter     Imaging & Consults:  None  Will send throat culture as had strep throat last month.    Encouraged her to try a steroid nasal spray and claritin or zyrtec.  This would help if the sore throat is due to post nasal drainage.  Sometimes we see chronic sore throats following a Covid infection.      Follow up with ENT.    Patient verbalizes understanding of plan.    No follow-ups on file.

## 2025-03-19 NOTE — TELEPHONE ENCOUNTER
New referral faxed to Dr. Kebede    Message sent to [facility] referral pool

## 2025-03-19 NOTE — TELEPHONE ENCOUNTER
3/19/2025  8:11 AM Y Yue Drew RN Patient Medical Advice Request  Referral     Patient viewed [employer] message

## 2025-03-20 NOTE — TELEPHONE ENCOUNTER
Samia Quintana [facility] ER Clinical Staff  Hello  Thank you, this referral to Holli Kebede MD., has been approved.  No auth is needed for this in network office visit / consult.    Thank you!!  Samia

## 2025-05-12 ENCOUNTER — OFFICE VISIT (OUTPATIENT)
Dept: FAMILY MEDICINE CLINIC | Facility: CLINIC | Age: 20
End: 2025-05-12
Payer: COMMERCIAL

## 2025-05-12 ENCOUNTER — LAB ENCOUNTER (OUTPATIENT)
Dept: LAB | Age: 20
End: 2025-05-12
Attending: NURSE PRACTITIONER
Payer: COMMERCIAL

## 2025-05-12 VITALS
DIASTOLIC BLOOD PRESSURE: 70 MMHG | WEIGHT: 127 LBS | TEMPERATURE: 98 F | SYSTOLIC BLOOD PRESSURE: 122 MMHG | OXYGEN SATURATION: 98 % | BODY MASS INDEX: 20.41 KG/M2 | HEART RATE: 103 BPM | HEIGHT: 66 IN

## 2025-05-12 DIAGNOSIS — E61.1 IRON DEFICIENCY: ICD-10-CM

## 2025-05-12 DIAGNOSIS — R59.0 CERVICAL LYMPHADENOPATHY: ICD-10-CM

## 2025-05-12 DIAGNOSIS — Z00.00 GENERAL MEDICAL EXAM: Primary | ICD-10-CM

## 2025-05-12 DIAGNOSIS — Z71.85 IMMUNIZATION COUNSELING: ICD-10-CM

## 2025-05-12 DIAGNOSIS — F90.2 ATTENTION DEFICIT HYPERACTIVITY DISORDER (ADHD), COMBINED TYPE: ICD-10-CM

## 2025-05-12 DIAGNOSIS — F84.0 AUTISM (HCC): ICD-10-CM

## 2025-05-12 DIAGNOSIS — F41.1 GENERALIZED ANXIETY DISORDER: ICD-10-CM

## 2025-05-12 DIAGNOSIS — Z00.00 GENERAL MEDICAL EXAM: ICD-10-CM

## 2025-05-12 PROBLEM — N83.202 LEFT OVARIAN CYST: Status: RESOLVED | Noted: 2024-01-19 | Resolved: 2025-05-12

## 2025-05-12 LAB
ALBUMIN SERPL-MCNC: 5.1 G/DL (ref 3.2–4.8)
ALBUMIN/GLOB SERPL: 2.1 {RATIO} (ref 1–2)
ALP LIVER SERPL-CCNC: 54 U/L (ref 52–144)
ALT SERPL-CCNC: 8 U/L (ref 10–49)
ANION GAP SERPL CALC-SCNC: 5 MMOL/L (ref 0–18)
AST SERPL-CCNC: 17 U/L (ref ?–34)
BASOPHILS # BLD AUTO: 0.01 X10(3) UL (ref 0–0.2)
BASOPHILS NFR BLD AUTO: 0.1 %
BILIRUB SERPL-MCNC: 0.4 MG/DL (ref 0.3–1.2)
BUN BLD-MCNC: 9 MG/DL (ref 9–23)
CALCIUM BLD-MCNC: 10 MG/DL (ref 8.7–10.6)
CHLORIDE SERPL-SCNC: 105 MMOL/L (ref 98–112)
CO2 SERPL-SCNC: 28 MMOL/L (ref 21–32)
CREAT BLD-MCNC: 0.82 MG/DL (ref 0.55–1.02)
DEPRECATED HBV CORE AB SER IA-ACNC: 75 NG/ML (ref 50–306)
EGFRCR SERPLBLD CKD-EPI 2021: 105 ML/MIN/1.73M2 (ref 60–?)
EOSINOPHIL # BLD AUTO: 0.12 X10(3) UL (ref 0–0.7)
EOSINOPHIL NFR BLD AUTO: 1.4 %
ERYTHROCYTE [DISTWIDTH] IN BLOOD BY AUTOMATED COUNT: 12.3 %
GLOBULIN PLAS-MCNC: 2.4 G/DL (ref 2–3.5)
GLUCOSE BLD-MCNC: 84 MG/DL (ref 70–99)
HCT VFR BLD AUTO: 43.4 % (ref 35–48)
HGB BLD-MCNC: 14.2 G/DL (ref 12–16)
IMM GRANULOCYTES # BLD AUTO: 0.03 X10(3) UL (ref 0–1)
IMM GRANULOCYTES NFR BLD: 0.3 %
IRON SATN MFR SERPL: 24 % (ref 15–50)
IRON SERPL-MCNC: 76 UG/DL (ref 50–170)
LYMPHOCYTES # BLD AUTO: 1.87 X10(3) UL (ref 1–4)
LYMPHOCYTES NFR BLD AUTO: 21.6 %
MCH RBC QN AUTO: 27.8 PG (ref 26–34)
MCHC RBC AUTO-ENTMCNC: 32.7 G/DL (ref 31–37)
MCV RBC AUTO: 84.9 FL (ref 80–100)
MONOCYTES # BLD AUTO: 0.55 X10(3) UL (ref 0.1–1)
MONOCYTES NFR BLD AUTO: 6.3 %
NEUTROPHILS # BLD AUTO: 6.09 X10 (3) UL (ref 1.5–7.7)
NEUTROPHILS # BLD AUTO: 6.09 X10(3) UL (ref 1.5–7.7)
NEUTROPHILS NFR BLD AUTO: 70.3 %
OSMOLALITY SERPL CALC.SUM OF ELEC: 284 MOSM/KG (ref 275–295)
PLATELET # BLD AUTO: 365 10(3)UL (ref 150–450)
POTASSIUM SERPL-SCNC: 4.7 MMOL/L (ref 3.5–5.1)
PROT SERPL-MCNC: 7.5 G/DL (ref 5.7–8.2)
RBC # BLD AUTO: 5.11 X10(6)UL (ref 3.8–5.3)
SODIUM SERPL-SCNC: 138 MMOL/L (ref 136–145)
T4 FREE SERPL-MCNC: 1.3 NG/DL (ref 0.8–1.7)
TOTAL IRON BINDING CAPACITY: 317 UG/DL (ref 250–425)
TRANSFERRIN SERPL-MCNC: 256 MG/DL (ref 250–380)
TSI SER-ACNC: 1.06 UIU/ML (ref 0.55–4.78)
WBC # BLD AUTO: 8.7 X10(3) UL (ref 4–11)

## 2025-05-12 PROCEDURE — 82728 ASSAY OF FERRITIN: CPT

## 2025-05-12 PROCEDURE — 80053 COMPREHEN METABOLIC PANEL: CPT

## 2025-05-12 PROCEDURE — 85025 COMPLETE CBC W/AUTO DIFF WBC: CPT

## 2025-05-12 PROCEDURE — 83550 IRON BINDING TEST: CPT

## 2025-05-12 PROCEDURE — 36415 COLL VENOUS BLD VENIPUNCTURE: CPT

## 2025-05-12 PROCEDURE — 83540 ASSAY OF IRON: CPT

## 2025-05-12 PROCEDURE — 84443 ASSAY THYROID STIM HORMONE: CPT

## 2025-05-12 PROCEDURE — 84439 ASSAY OF FREE THYROXINE: CPT

## 2025-05-12 RX ORDER — SERTRALINE HYDROCHLORIDE 25 MG/1
25 TABLET, FILM COATED ORAL DAILY
COMMUNITY

## 2025-05-12 NOTE — PROGRESS NOTES
HPI:   Patient is here for physical.    Concerns:   Would like a copy of her vaccine record for school  Going to ENT for recurrent sore throat. Worried she has an abnormal bump near her right jaw/ear. First noticed around the fall.   Has history of low iron levels and would like to see where her level is now.     LMP: 4/27/25  Menstrual Cycle Length: regular cycles, normal flow  PMS: minimal with taking Zoloft. 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     AST (U/L)   Date Value   03/31/2024 22   04/20/2023 16   02/07/2022 10 (L)     ALT (U/L)   Date Value   03/31/2024 23   04/20/2023 19   02/07/2022 17        Current Medications[1]   Past Medical History[2]   Past Surgical History[3]   Family History[4]   Social History:   Short Social Hx on File[5]     REVIEW OF SYSTEMS:   Review of Systems   Constitutional:  Negative for chills, fatigue, fever and unexpected weight change.   HENT:  Positive for sore throat (intermittently, seeing ENT). Negative for congestion, ear pain, postnasal drip, rhinorrhea, trouble swallowing and voice change.    Eyes:  Negative for visual disturbance.   Respiratory:  Negative for cough and shortness of breath.    Cardiovascular:  Negative for chest pain and palpitations.   Gastrointestinal:  Negative for abdominal pain, blood in stool, constipation, diarrhea, nausea and vomiting.   Endocrine: Negative for cold intolerance, heat intolerance, polydipsia, polyphagia and polyuria.   Genitourinary:  Negative for dysuria, frequency, hematuria and pelvic pain.    Musculoskeletal:  Positive for back pain (intermittently).   Skin:  Negative for rash.   Neurological:  Negative for headaches.   Hematological:  Does not bruise/bleed easily.   Psychiatric/Behavioral:  Negative for dysphoric mood and sleep disturbance. The patient is nervous/anxious.        EXAM:   /70   Pulse 103   Temp 98 °F (36.7 °C)   Ht 5' 6\" (1.676 m)   Wt 127 lb (57.6 kg)   LMP 04/27/2025 (Approximate)   SpO2 98%   BMI 20.50 kg/m²   Ideal body weight: 59.3 kg (130 lb 11.7 oz)   Physical Exam  Constitutional:       General: She is not in acute distress.     Appearance: She is well-developed, well-groomed and normal weight. She is not ill-appearing.   HENT:      Right Ear: Tympanic membrane, ear canal and external ear normal.      Left Ear: Tympanic membrane, ear canal and external ear normal.      Nose: Nose normal.      Mouth/Throat:      Mouth: Mucous membranes are moist.      Pharynx: Oropharynx is clear.   Eyes:      Conjunctiva/sclera: Conjunctivae normal.      Pupils: Pupils are equal, round, and reactive to light.   Neck:      Thyroid: No thyromegaly.     Cardiovascular:      Rate and Rhythm: Normal rate and regular rhythm.      Heart sounds: Normal heart sounds.   Pulmonary:      Effort: Pulmonary effort is normal.      Breath sounds: Normal breath sounds.   Abdominal:      General: Abdomen is flat. Bowel sounds are normal.      Palpations: Abdomen is soft.      Tenderness: There is no abdominal tenderness.   Musculoskeletal:         General: Normal range of motion.      Cervical back: Normal range of motion.   Skin:     General: Skin is warm and dry.   Neurological:      General: No focal deficit present.      Mental Status: She is alert and oriented to person, place, and time.   Psychiatric:         Mood and Affect: Mood normal.         ASSESSMENT AND PLAN:   Diagnoses and all orders for this visit:    General medical exam  -     CBC W Differential W Platelet [E]; Future  -     Comp  Metabolic Panel (14) [E]; Future  -     TSH and Free T4 [E]; Future    Autism (HCC)    Attention deficit hyperactivity disorder (ADHD), combined type  Comments:  following with psych    Generalized anxiety disorder  Comments:  following with psych    Iron deficiency  Comments:  check lab work  Orders:  -     Ferritin [E]; Future  -     Iron And Tibc [E]; Future    Cervical lymphadenopathy  Comments:  check u/s  Orders:  -     US HEAD/NECK (CPT=76536); Future    Immunization counseling  Comments:  consider Meningococcal B vaccine given that she will be living on campus next year        Note to patient: The 21st Century Cures Act makes medical notes like these available to patients in the interest of transparency. However, be advised this is a medical document. It is intended as peer to peer communication. It is written in medical language and may contain abbreviations or verbiage that are unfamiliar. It may appear blunt or direct. Medical documents are intended to carry relevant information, facts as evident, and the clinical opinion of the practitioner.           [1]   Current Outpatient Medications   Medication Sig Dispense Refill    sertraline 25 MG Oral Tab Take 1 tablet (25 mg total) by mouth daily.      IRON OR      [2]   Past Medical History:   Anxiety    Attention deficit hyperactivity disorder (ADHD)    not on any medications    Autism spectrum disorder (HCC)    pt states she was thought to be on  spectrum when she was school age- does not feel that is correct    Back problem    Depression    Hx of motion sickness    Left ovarian cyst    Migraines    Muscle weakness    bilateral hands    Seizure disorder (HCC)    twice in 5th grade- none since then   [3]   Past Surgical History:  Procedure Laterality Date    Endoscopy of bowel pouch      Knee surgery Right 06/25/2024    PCL repair   [4]   Family History  Problem Relation Age of Onset    Breast Cancer Maternal Grandmother     Breast Cancer Maternal Aunt      Breast Cancer Maternal Great-Grandmother    [5]   Social History  Socioeconomic History    Marital status: Single   Tobacco Use    Smoking status: Never     Passive exposure: Never    Smokeless tobacco: Never    Tobacco comments:     non-smoker   Vaping Use    Vaping status: Never Used   Substance and Sexual Activity    Alcohol use: Never    Drug use: Never    Sexual activity: Never     Partners: Male   Other Topics Concern    Caffeine Concern Yes     Comment: 1-2x cups daily    Exercise Yes     Comment: active    Seat Belt Yes     Social Drivers of Health     Food Insecurity: No Food Insecurity (5/12/2025)    NCSS - Food Insecurity     Worried About Running Out of Food in the Last Year: No     Ran Out of Food in the Last Year: No   Transportation Needs: No Transportation Needs (5/12/2025)    NCSS - Transportation     Lack of Transportation: No   Housing Stability: Not At Risk (5/12/2025)    NCSS - Housing/Utilities     Has Housing: Yes     Worried About Losing Housing: No     Unable to Get Utilities: No

## 2025-05-14 ENCOUNTER — PATIENT MESSAGE (OUTPATIENT)
Dept: FAMILY MEDICINE CLINIC | Facility: CLINIC | Age: 20
End: 2025-05-14

## 2025-05-14 NOTE — TELEPHONE ENCOUNTER
----- Message from Sherrell Zheng sent at 5/14/2025  7:42 AM CDT -----  Results reviewed.   Chemistries stable  Thyroid function normal  Iron levels normal, no anemia    If she'd like, could switch to regular women's multivitamin with iron but would not need specific iron supplement at this point    We can also offer Meningitis B vaccine in our office if she interested. Could schedule RN visit  ----- Message -----  From: Lab, Background User  Sent: 5/12/2025   9:49 PM CDT  To: SHANTIH Sandoval

## 2025-06-02 ENCOUNTER — HOSPITAL ENCOUNTER (OUTPATIENT)
Dept: ULTRASOUND IMAGING | Age: 20
Discharge: HOME | End: 2025-06-02
Attending: NURSE PRACTITIONER
Payer: COMMERCIAL

## 2025-06-02 DIAGNOSIS — R59.0 CERVICAL LYMPHADENOPATHY: ICD-10-CM

## 2025-06-02 PROCEDURE — 76536 US EXAM OF HEAD AND NECK: CPT | Performed by: NURSE PRACTITIONER

## 2025-06-03 ENCOUNTER — TELEPHONE (OUTPATIENT)
Dept: FAMILY MEDICINE CLINIC | Facility: CLINIC | Age: 20
End: 2025-06-03

## 2025-06-03 DIAGNOSIS — R59.0 CERVICAL LYMPHADENOPATHY: Primary | ICD-10-CM

## 2025-06-03 NOTE — TELEPHONE ENCOUNTER
----- Message from Sherrell Zheng sent at 6/3/2025  7:29 AM CDT -----  U/s shows area of concern are lymph nodes. If still bothersome or she can still feel is enlarged, see ENT. Refer to Dr. Allen   ----- Message -----  From: Jaden, [facility]  Sent: 6/2/2025   5:34 PM CDT  To: Sherrell Zheng, SHANTHI

## 2025-07-29 ENCOUNTER — OFFICE VISIT (OUTPATIENT)
Facility: LOCATION | Age: 20
End: 2025-07-29
Payer: COMMERCIAL

## 2025-07-29 DIAGNOSIS — J02.9 SORE THROAT: Primary | ICD-10-CM

## 2025-07-29 PROCEDURE — 99212 OFFICE O/P EST SF 10 MIN: CPT | Performed by: STUDENT IN AN ORGANIZED HEALTH CARE EDUCATION/TRAINING PROGRAM

## (undated) DIAGNOSIS — Z01.818 PREOP EXAMINATION: Primary | ICD-10-CM

## (undated) DIAGNOSIS — D50.9 IRON DEFICIENCY ANEMIA, UNSPECIFIED IRON DEFICIENCY ANEMIA TYPE: Primary | ICD-10-CM

## (undated) DIAGNOSIS — Z11.59 ENCOUNTER FOR SCREENING FOR OTHER VIRAL DISEASES: ICD-10-CM

## (undated) DEVICE — TUBING PMP L16FT DISP

## (undated) DEVICE — SUT TIGERLOOP 2 20IN N ABSRB WHT GRN

## (undated) DEVICE — DISPOSABLE TOURNIQUET CUFF SINGLE BLADDER, DUAL PORT AND QUICK CONNECT CONNECTOR: Brand: COLOR CUFF

## (undated) DEVICE — SOLUTION IRRIG 3000ML 0.9% NACL FLX CONT

## (undated) DEVICE — GLOVE SUR 7.5 SENSICARE PI PIP CRM PWD F

## (undated) DEVICE — BANDAGE COHESIVE 4INX5YD TAN E POR SLF ADH

## (undated) DEVICE — GAUZE - RF AND X-RAY DETECTABLE USP TYPE VII, 16 PLY: Brand: SITUATE

## (undated) DEVICE — SUT ETHBND XL 5 30IN V-37 NABSRB GRN 40MM 1/2

## (undated) DEVICE — #10 STERILE BLADE: Brand: POLYMER COATED BLADES

## (undated) DEVICE — SOLUTION IRRIG 1000ML 0.9% NACL USP BTL

## (undated) DEVICE — [RESECTOR CUTTER, ARTHROSCOPIC SHAVER BLADE,  DO NOT RESTERILIZE,  DO NOT USE IF PACKAGE IS DAMAGED,  KEEP DRY,  KEEP AWAY FROM SUNLIGHT]: Brand: FORMULA

## (undated) DEVICE — DECANTER BAG 9": Brand: MEDLINE INDUSTRIES, INC.

## (undated) DEVICE — 3M™ STERI-DRAPE™ INSTRUMENT POUCH 1018: Brand: STERI-DRAPE™

## (undated) DEVICE — MEDI-VAC NON-CONDUCTIVE SUCTION TUBING: Brand: CARDINAL HEALTH

## (undated) DEVICE — WEREWOLF FLOW 50 COBLATION WAND: Brand: COBLATION

## (undated) DEVICE — #15 STERILE STAINLESS BLADE: Brand: STERILE STAINLESS BLADES

## (undated) DEVICE — SLEEVE COMPR MD KNEE LEN SGL USE KENDALL SCD

## (undated) DEVICE — SUT MCRYL 4-0 27IN ABSRB UD 19MM PS-2 3/8

## (undated) DEVICE — SUT MCRYL 0 27IN CT-1 ABSRB VLT 36MM 1/2 CIR

## (undated) DEVICE — SMOOTHER TOOL 7.9MM: Brand: ACUFEX

## (undated) DEVICE — ACORN REAMER, STERILE 20 - 130MM (PLUS OR MINUS 10 PERCENT) 10.5MM

## (undated) DEVICE — 3M™ IOBAN™ 2 ANTIMICROBIAL INCISE DRAPE 6648EZ: Brand: IOBAN™ 2

## (undated) DEVICE — Device

## (undated) DEVICE — SHEET,DRAPE,40X58,STERILE: Brand: MEDLINE

## (undated) DEVICE — KNEE ARTHROSCOPY CDS: Brand: MEDLINE INDUSTRIES, INC.

## (undated) DEVICE — YANKAUER,FLEXIBLE HANDLE,REGLR CAPACITY: Brand: MEDLINE INDUSTRIES, INC.

## (undated) DEVICE — 3M™ TEGADERM™ +PAD FILM DRESSING WITH NON-ADHERENT PAD, 3584, 2-3/8 IN X 4 IN (6 CM X 10 CM), 50/CAR, 4 CAR/CS: Brand: 3M™ TEGADERM™

## (undated) DEVICE — COVER,MAYO STAND,STERILE: Brand: MEDLINE

## (undated) DEVICE — BLADE SAW 9X25MM THK0.51MM CUT THK0.56MM

## (undated) DEVICE — DEVICE SUCT FLR W/ TBNG WATERBUG QUIET 30 PER

## (undated) DEVICE — ACL ADD ON PACK-LF: Brand: MEDLINE INDUSTRIES, INC.

## (undated) DEVICE — SUT FIBERLOOP 2 20IN N ABSRB BLU L76MM

## (undated) DEVICE — 1.2 RAP-PAC B LATEX FREE: Brand: RAP-PAC

## (undated) DEVICE — GLOVE SUR 7.5 SENSICARE PI PIP GRN PWD F

## (undated) DEVICE — SUT MCRYL 3-0 27IN ABSRB UD 19MM PS-2 3/8

## (undated) DEVICE — AGGRESSIVE PLUS, ANGLED CUTTER: Brand: FORMULA

## (undated) DEVICE — [AGGRESSIVE PLUS CUTTER, ARTHROSCOPIC SHAVER BLADE,  DO NOT RESTERILIZE,  DO NOT USE IF PACKAGE IS DAMAGED,  KEEP DRY,  KEEP AWAY FROM SUNLIGHT]: Brand: FORMULA

## (undated) DEVICE — MEGADYNE E-Z CLEAN NDLE 2.5IN

## (undated) DEVICE — DRAPE SUR SM W12XL18IN CLR PLAS REINF ADH

## (undated) DEVICE — SUT ORTHOCORD 2 VLT ABSRB OS-6 NDL PDS

## (undated) DEVICE — IMPLSYS 2NDRY FIXATN PEEKSWVLK 4.75X19.1
Type: IMPLANTABLE DEVICE | Site: KNEE | Status: NON-FUNCTIONAL
Brand: ARTHREX®
Removed: 2024-06-25

## (undated) DEVICE — 3M™ TEGADERM™ +PAD FILM DRESSING WITH NON-ADHERENT PAD, 3587, 3-1/2 IN X 4-1/8 IN (9 CM X 10.5 CM), 25/CAR, 4 CAR/CS: Brand: 3M™ TEGADERM™

## (undated) DEVICE — SUT MCRYL 2-0 27IN SH ABSRB UD 26MM 1/2 CIR

## (undated) DEVICE — SUT FBRWR 2 38IN N ABSRB BLU L26.5MM 1/2

## (undated) DEVICE — PENCIL SMK EVAC L10FT MPLR BLDE JAW OPN

## (undated) DEVICE — ADHESIVE SKIN TOP FOR WND CLSR DERMBND ADV

## (undated) NOTE — LETTER
Date & Time: 5/8/2022, 9:56 AM  Patient: Guillermolan Car  Encounter Provider(s):    Caryle Ratel, PA-C       To Whom It May Concern:    Flei Rinaldi was seen and treated in our department on 5/8/2022. I recommend she quarantine for an additional 2 or 3 days.   If you have any questions or concerns, please do not hesitate to call.        _____________________________  Physician/APC Signature

## (undated) NOTE — MR AVS SNAPSHOT
After Visit Summary   2024    Arianna Brown   MRN: [id number]           Visit Information     Date & Time  2024  1:30 PM Provider  [id number] Department  [facility] EEG Dept. Phone  [phone]      Your Vitals Were     LMP   04/10/2024 (Approximate)             Allergies as of 2024  Review status set to Review Complete on 2024   No Known Allergies     Your Current Medications        Dosage    Acetaminophen 500 MG Oral Cap () Take 2 capsules (1,000 mg total) by mouth every 6 (six) hours for 14 days.    Sennosides-Docusate Sodium 8.6-50 MG Oral Cap Take 1 capsule by mouth daily as needed.    traMADol 50 MG Oral Tab Please take 1 tablet every 6 hours as needed for pain. Max of 8 tablets per day. Collaborating - Dr. Jose G Contreras.    ondansetron (ZOFRAN) 4 mg tablet Take 1 tablet by mouth every 8 hours as needed for nausea.    Meloxicam 15 MG Oral Tab Take 1 tablet (15 mg total) by mouth daily.    cyclobenzaprine 10 MG Oral Tab Take 1 tablet (10 mg total) by mouth.    SUMAtriptan 25 MG Oral Tab     fluticasone propionate 50 MCG/ACT Nasal Suspension 2 sprays by Each Nare route daily.    meclizine 12.5 MG Oral Tab Take 1 tablet (12.5 mg total) by mouth 2 (two) times daily as needed for Dizziness.    sertraline 50 MG Oral Tab Take 1.5 tablets (75 mg total) by mouth daily.      Diagnoses for This Visit    Chronic migraine w/o aura w/o status migrainosus, not intractable   [487172]             We Ordered the Following     Normal Orders This Visit    EEG (At [facility]) [NEU4 CUSTOM]       Future Appointments        Provider Department    2024 9:30 AM [facility]    2024 2:15 PM [facility] [place]    2024 2:15 PM [facility] [place]    2024 11:00 AM Dotty Covarrubias [facility], [address] [place] [address]    2024 2:00 PM [facility] [place]    7/5/2024 11:40 AM DonnyYoni [facility], [facility]    7/5/2024 2:15 PM Berberich, [facility] [place]    7/9/2024 2:00 PM Berberich, [facility]ia [facility] [place]    7/11/2024 2:00 PM Berberich, [facility] [place]    7/16/2024 2:00 PM Berberich, [facility] [place]    7/18/2024 2:00 PM Berberich, [facility] [place]    7/23/2024 2:00 PM Berberich, [facility] [place]    7/25/2024 2:00 PM Berberich, [facility] [place]    7/30/2024 2:00 PM Berberich, [facility] [place]    8/1/2024 2:00 PM Berberich, [facility] [place]                Did you know that [facility] primary care physicians now offer Video Visits through [employer] for adult patients for a variety of conditions such as allergies, back pain and cold symptoms? Skip the drive and waiting room and online chat with a doctor face-to-face using your web-cam enabled computer or mobile device wherever you are. Video Visits cost $50 and can be paid hassle-free using a credit, debit, or health savings card.  Not active on [employer]? Ask us how to get signed up today!          If you receive a survey from [employer], please take a few minutes to complete it and provide feedback. We strive to deliver the best patient experience and are looking for ways to make improvements. Your feedback will help us do so. For more information on [employer], please visit [email or web address]           No text in SmartText           No text in SmartText